# Patient Record
Sex: FEMALE | Race: WHITE | Employment: UNEMPLOYED
[De-identification: names, ages, dates, MRNs, and addresses within clinical notes are randomized per-mention and may not be internally consistent; named-entity substitution may affect disease eponyms.]

---

## 2017-06-03 ENCOUNTER — HEALTH MAINTENANCE LETTER (OUTPATIENT)
Age: 32
End: 2017-06-03

## 2017-07-01 ENCOUNTER — TRANSFERRED RECORDS (OUTPATIENT)
Dept: HEALTH INFORMATION MANAGEMENT | Facility: CLINIC | Age: 32
End: 2017-07-01

## 2017-07-01 LAB — PAP SMEAR - HIM PATIENT REPORTED: NEGATIVE

## 2018-01-18 ENCOUNTER — TRANSFERRED RECORDS (OUTPATIENT)
Dept: HEALTH INFORMATION MANAGEMENT | Facility: CLINIC | Age: 33
End: 2018-01-18

## 2018-01-29 ENCOUNTER — ALLIED HEALTH/NURSE VISIT (OUTPATIENT)
Dept: EDUCATION SERVICES | Facility: CLINIC | Age: 33
End: 2018-01-29
Payer: COMMERCIAL

## 2018-01-29 DIAGNOSIS — O24.419 GESTATIONAL DIABETES: Primary | ICD-10-CM

## 2018-01-29 PROCEDURE — G0109 DIAB MANAGE TRN IND/GROUP: HCPCS

## 2018-01-29 NOTE — PROGRESS NOTES
Diabetes Self-Management Training - Gestational Diabetes    SUBJECTIVE/OBJECTIVE:  Eloina Aj presents today for education related to gestational diabetes.  She is accompanied by self    Patient's gestational diabetes management related comments/concerns: doesn't want insulin this time     Patient's emotional response to diabetes: expresses readiness to learn and concern for health and well-being    There were no vitals taken for this visit.    Pre pregnancy weight: 270#    Weight gain (-)11 lbs at 30 weeks gestation.    Estimated Date of Delivery: Data Unavailable    Lab Results   Component Value Date     2006       3 hour OGTT: Fastin; 1 hr: 169; 2 hr: 187, 3 hr: 126 on 18    History   Smoking Status     Never Smoker   Smokeless Tobacco     Not on file       Lifestyle and Health Behaviors:  Physical Activity: no regular exercise program    Nutrition:  Patient eats 3 meals and 3 snacks per day.    Breakfast:  Eggs 2 slices toast OR eggs with 1 cup fruit  Snack:  Pop chips OR banana  Lunch:  Salad with chicken OR lasagna and [place] bread  Snack:  Chocolate OR skips  Dinner:  Chicken salad OR tator tot hot dish  Bedtime Snack:  Fruit OR skips    Other time(s) food is eaten? no     Beverages: water    Cultural/Worship diet restrictions: No     Biggest challenge to healthy eating is:  none    Pre- Vitamin: Yes    Supplements: No   Experiencing nausea?  No     Socio/Economic considerations:  Support System: family and spouse/significant other    Health Beliefs and Attitudes:   Stage of Change: ACTION (Actively working towards change)    ASSESSMENT:  Patient had GDM in previous pregnancy, and did need insulin at that time. Is already testing blood sugars    Date Fasting blood sugar 1 hour post brkfst 1 hour post lunch 1 hour post dinner     92 114      95 106  102    95 108 *115 120       INTERVENTION:    Educational topics covered today:  GDM diagnosis, pathophysiology,  Risks and Complications of GDM, Means of controlling GDM, Using a Blood Glucose Monitor, Blood Glucose Goals, Logging and Interpreting Glucose Results, Ketone Testing, When to Call a Diabetes Educator or OB Provider, Healthy Eating During Pregnancy, Counting Carbohydrates, Meal Planning for GDM, and Physical Activity    Educational materials provided today:   Benedict Understanding Gestational Diabetes  GDM Log Book  Sharps Disposal  Care After Delivery    Pt verbalized understanding of concepts discussed and recommendations provided today.     PLAN:  Check glucose 4 times daily, before breakfast and 1 hour after each meal.     Check Ketones daily for one week, if negative, reduce testing to once a week.     Physical activity recommended: as able.    Meal plan: 2-3 carbs at breakfast, 3-4 carbs at lunch, 3-4 carbs at supper, 1-2 carbs at 3 snacks a day.  Follow consistent CHO meal plan, eat CHO and protein/fat at all meals/snacks.    Call/e-mail/MyChart message diabetes educator if 3 or more blood sugars are above the goal in 1 week or if ketones are positive.     Call/e-mail/MyChart message with questions/concerns.    FOLLOW-UP:  Call or e-mail educator if 3 or more blood sugars are above goal in 1 week.  Call or e-mail with questions or concerns.  Appointment scheduled on 2/6.     PALOMO Sullivan CDE  Time Spent: 90 minutes  Encounter type: Group class

## 2018-01-29 NOTE — MR AVS SNAPSHOT
"              After Visit Summary   1/29/2018    Eloina Aj    MRN: [id number]           Patient Information     Date Of Birth          1985        Visit Information        Provider Department      1/29/2018 8:30 AM RI GDM [facility]        Today's Diagnoses     Gestational diabetes    -  1       Follow-ups after your visit        Your next 10 appointments already scheduled     Feb 06, 2018 12:30 PM CST   Diabetic Education with [place] DIABETIC ED RESOURCE   [facility] ([facility])    [address]  [facility] [address] [phone]            Mar 29, 2018   Procedure with Fern Nicolas MD   [facility] Labor and Delivery (--)    [address]  [facility] [address] [phone]              Who to contact     If you have questions or need follow up information about today's clinic visit or your schedule please contact [facility] directly at [phone].  Normal or non-critical lab and imaging results will be communicated to you by [employer], letter or phone within 4 business days after the clinic has received the results. If you do not hear from us within 7 days, please contact the clinic through [employer] or phone. If you have a critical or abnormal lab result, we will notify you by phone as soon as possible.  Submit refill requests through [employer] or call your pharmacy and they will forward the refill request to us. Please allow 3 business days for your refill to be completed.          Additional Information About Your Visit        [employer] Information     [employer] lets you send messages to your doctor, view your test results, renew your prescriptions, schedule appointments and more. To sign up, go to [email or web address] . Click on \"Log in\" on the left side of the screen, which will take you to the Welcome page. Then click on \"Sign up Now\" on the right side of the page. "     You will be asked to enter the access code listed below, as well as some personal information. Please follow the directions to create your username and password.     Your access code is: [email or web address]  Expires: 2018 11:07 AM     Your access code will  in 90 days. If you need help or a new code, please call your [place] clinic or [phone].        Care EveryWhere ID     This is your Care EveryWhere ID. This could be used by other organizations to access your [place] medical records  [id number]         Blood Pressure from Last 3 Encounters:   06 122/64   06 132/80   05 122/68    Weight from Last 3 Encounters:   06 73.9 kg (163 lb)   06 74.8 kg (165 lb)   05 73.9 kg (163 lb)              We Performed the Following     DIABETES EDUCATION - Group []           Today's Medication Changes          These changes are accurate as of 18 11:07 AM.  If you have any questions, ask your nurse or doctor.               Start taking these medicines.        Dose/Directions    acetone (Urine) test Strp   Used for:  Gestational diabetes        Test once daily x1 week, then reduce to once weekly   Quantity:  25 each   Refills:  1            Where to get your medicines      These medications were sent to [facility] [address] [place] [address] [address] [facility] [address] [facility]   [facility] [address], [facility] [address]     Phone:  [phone]     acetone (Urine) test Strp                Primary Care Provider Office Phone # Fax #    Elijah Diggs [phone]       [address] [place] [address]        Equal Access to Services     WILLA CARSON AH: Hadii jayson Chatman, wajayyda luqadaha, qaybta kaalmada adilia, mary dickens. So [facility] [phone].    ATENCIÓN: Si habla español, tiene a marrufo disposición servicios gratuitos de asistencia lingüística. Llame al [phone].    We comply with applicable federal  civil rights laws and [place] laws. We do not discriminate on the basis of race, color, national origin, age, disability, sex, sexual orientation, or gender identity.            Thank you!     Thank you for choosing [facility]  for your care. Our goal is always to provide you with excellent care. Hearing back from our patients is one way we can continue to improve our services. Please take a few minutes to complete the written survey that you may receive in the mail after your visit with us. Thank you!             Your Updated Medication List - Protect others around you: Learn how to safely use, store and throw away your medicines at www.disposemymeds.org.          This list is accurate as of 1/29/18 11:07 AM.  Always use your most recent med list.                   Brand Name Dispense Instructions for use Diagnosis    acetone (Urine) test Strp     25 each    Test once daily x1 week, then reduce to once weekly    Gestational diabetes       EFFEXOR XR 75 MG 24 hr capsule   Generic drug:  venlafaxine     3 month    1 CAPSULE DAILY WITH FOOD    Depressive disorder, not elsewhere classified       ORTHO TRI-CYCLEN TABS   OR     3 PK    1 tab po as directed        * RELPAX 40 MG tablet   Generic drug:  eletriptan     30    1 TABLET 1 TIME ONLY,REPEAT AFTER 2 HR AS NEEDED    Migraine, unspecified, without mention of intractable migraine without mention of status migrainosus       * RELPAX 40 MG tablet   Generic drug:  eletriptan     30    1 TABLET 1 TIME ONLY,REPEAT AFTER 2 HR AS NEEDED    Headache(784.0)       * Notice:  This list has 2 medication(s) that are the same as other medications prescribed for you. Read the directions carefully, and ask your doctor or other care provider to review them with you.

## 2018-02-06 ENCOUNTER — ALLIED HEALTH/NURSE VISIT (OUTPATIENT)
Dept: EDUCATION SERVICES | Facility: CLINIC | Age: 33
End: 2018-02-06
Payer: COMMERCIAL

## 2018-02-06 DIAGNOSIS — O24.419 GESTATIONAL DIABETES: Primary | ICD-10-CM

## 2018-02-06 PROCEDURE — G0108 DIAB MANAGE TRN  PER INDIV: HCPCS

## 2018-02-06 NOTE — MR AVS SNAPSHOT
After Visit Summary   2/6/2018    Eloina Aj    MRN: [id number]           Patient Information     Date Of Birth          1985        Visit Information        Provider Department      2/6/2018 12:30 PM [place] DIABETIC ED RESOURCE [facility] Instructions    Here are some ideas for breakfast or bedtime snack. Pick a carbohydrate from the right and a protein from the left. If you have carbohydrates 30 grams of total carbohydrate and 3-5 grams of fiber that is even better.   Fruits are not listed as they can cause the blood sugar to raise blood sugar quickly and be used up early in the night. Fruits are better at meals, or morning or afternoon snack.     Protein/Fat list (about 14 grams of protein or 2 fat servings) Carbohydrate list (about 30 grams of carbohydrate)   2 slices of cheese English Muffin   2 TBS Peanut butter/[place] butter/Sun butter 10 crackers     cup Cottage cheese 2% 2 pieces of toast   2 oz any cooked meat - less than deck of cards size 1 hamburger or hot dog bun   1.5 oz of soy nuts 1 whole wheat manuelito   Avocado or guacamole 6 soniya cracker squares     cup tuna - can add mayonnaise 1 cup full fat ice cream - no candy or sauce   2 eggs - boiled, poached, fried, scrambled, omelet 30 chips   1 veggie mariam (might have carbohydrates also) Greek yogurt - 30 grams of carbohydrate   2 TBS Coconut 2 - 6 inch tortillas corn or flour   12 shrimp - not breaded 2 toaster waffles - no syrup   2-1oz meatballs   bagel   2 Tbs cream cheese - plain, veggie, salmon - no fruit or honey. 6 cups popcorn - unsweetened     cup of nuts Granola bar of 3o grams of carbohydrate   10 olives 1 cup of unsweetened lentils or beans.    Tofu or Temph 4-5oz 1 cup potato salad     You can always add vegetables with dip, salad dressing or salsa also.             Follow-ups after your visit        Your next 10 appointments already scheduled     Mar 29, 2018   Procedure with  "Fern Nicolas MD   [facility] Labor and Delivery (--)    [address] [facility] [address] [phone]              Who to contact     If you have questions or need follow up information about today's clinic visit or your schedule please contact [facility] directly at [phone].  Normal or non-critical lab and imaging results will be communicated to you by MyChart, letter or phone within 4 business days after the clinic has received the results. If you do not hear from us within 7 days, please contact the clinic through MyChart or phone. If you have a critical or abnormal lab result, we will notify you by phone as soon as possible.  Submit refill requests through [employer] or call your pharmacy and they will forward the refill request to us. Please allow 3 business days for your refill to be completed.          Additional Information About Your Visit        [employer] Information     [employer] lets you send messages to your doctor, view your test results, renew your prescriptions, schedule appointments and more. To sign up, go to [email or web address] . Click on \"Log in\" on the left side of the screen, which will take you to the Welcome page. Then click on \"Sign up Now\" on the right side of the page.     You will be asked to enter the access code listed below, as well as some personal information. Please follow the directions to create your username and password.     Your access code is: [email or web address]  Expires: 2018 11:07 AM     Your access code will  in 90 days. If you need help or a new code, please call your [place] clinic or [phone].        Care EveryWhere ID     This is your Care EveryWhere ID. This could be used by other organizations to access your [place] medical records  [id number]         Blood Pressure from Last 3 Encounters:   06 122/64   06 132/80   05 122/68    Weight from Last 3 Encounters:   06 73.9 kg (163 " lb)   02/02/06 74.8 kg (165 lb)   05/16/05 73.9 kg (163 lb)              Today, you had the following     No orders found for display       Primary Care Provider Office Phone # Fax #    Elijah Diggs [phone] [address] [place] [address]        Equal Access to Services     [facility]: Hadii aad ku hadasho Soomaali, waaxda luqadaha, qaybta kaalmada adeegyada, waxay idiin hayaan adeeg kharash la'aan ah. So [facility] [phone].    ATENCIÓN: Si habla español, tiene a marrufo disposición servicios gratuitos de asistencia lingüística. Anna Marieame al [phone].    We comply with applicable federal civil rights laws and [place] laws. We do not discriminate on the basis of race, color, national origin, age, disability, sex, sexual orientation, or gender identity.            Thank you!     Thank you for choosing [facility]  for your care. Our goal is always to provide you with excellent care. Hearing back from our patients is one way we can continue to improve our services. Please take a few minutes to complete the written survey that you may receive in the mail after your visit with us. Thank you!             Your Updated Medication List - Protect others around you: Learn how to safely use, store and throw away your medicines at www.disposemymeds.org.          This list is accurate as of 2/6/18 12:58 PM.  Always use your most recent med list.                   Brand Name Dispense Instructions for use Diagnosis    acetone (Urine) test Strp     25 each    Test once daily x1 week, then reduce to once weekly    Gestational diabetes       EFFEXOR XR 75 MG 24 hr capsule   Generic drug:  venlafaxine     3 month    1 CAPSULE DAILY WITH FOOD    Depressive disorder, not elsewhere classified       ORTHO TRI-CYCLEN TABS   OR     3 PK    1 tab po as directed        * RELPAX 40 MG tablet   Generic drug:  eletriptan     30    1 TABLET 1 TIME ONLY,REPEAT AFTER 2 HR AS NEEDED     Migraine, unspecified, without mention of intractable migraine without mention of status migrainosus       * RELPAX 40 MG tablet   Generic drug:  eletriptan     30    1 TABLET 1 TIME ONLY,REPEAT AFTER 2 HR AS NEEDED    Headache(784.0)       * Notice:  This list has 2 medication(s) that are the same as other medications prescribed for you. Read the directions carefully, and ask your doctor or other care provider to review them with you.

## 2018-02-06 NOTE — PROGRESS NOTES
Gestational Diabetes Follow-up Visit    SUBJECTIVE/OBJECTIVE:  Eloina Aj presents today for education and evaluation of glucose control related to gestational diabetes    She is accompanied by self    Patient's gestational diabetes management related comments/concerns: hard time figuring out meal ideas    There were no vitals taken for this visit.    Weight gain (-6) lbs at 32 weeks gestation.--improved    Blood Glucose/Ketone Log:    Date Ketones Fasting Post Breakfast Post Lunch Post Supper   1/29 1/30 Neg 96 101 109 120   1/31 Neg 94 116 101 136   2/1 Neg 82 153 101 138   2/2 Neg 95 86     2/3 Neg 85 138 108    2/4  89 121 119    2/5 Neg 87 159  106   2/6 Neg 94 123       Current gestational diabetes management:    Taking medications for gestational diabetes? No    Physical Activity: no regular exercise program    Nutrition:  Patient is following recommended meal plan and counts carbohydrates in choices.    Breakfast- breakfast bar OR cereal (high numbers following this) OR eggs and toast  Snack- fruit OR vegetables OR cheese stick  Lunch - salad and yogurt OR skips  Snack - veggies OR cheese stick and chips  Dinner - salad with chicken  Snack - skips OR yogurt OR ice cream    ASSESSMENT:  Eloina is not very hungry after breakfast, so it is hard for her to eat lunch and snacks later in the day. There are some days in which she is only eating non carb snacks and skipping lunch, and the next day her fasting number is typically higher. She is also sometimes skipping bedtime snack or having it early (7pm), also showing a higher fasting number the next day.    Health Beliefs and Attitudes:   Stage of Change: ACTION (Actively working towards change)    INTERVENTION:  Encouraged at least the minimum carbs with each meal and snack.     Educational topics covered today:  What to expect after delivery, Future testing for Type 2 diabetes (2 hour OGTT at 6 week post-partum check-up and annual fasting blood  glucose level), Risk of GDM and planning ahead for future pregnancies, Recommended lifestyle interventions for reducing the risk of Type 2 Diabetes, When to Call a Diabetes Educator or OB Provider    Educational Materials provided today:  [facility]enedict Preventing Diabetes    PLAN:  Check glucose 4 times daily.  Check ketones once a week when readings are consistently negative.  Continue with recommended physical activity.  Continue to follow recommended meal plan: 2-3 carbs at breakfast, 3-4 carbs at lunch, 3-4 carbs at supper, 1-2 carbs at snacks.  Follow consistent CHO meal plan, eat CHO and protein/fat at all meals/snacks.    Call/e-mail/MyChart message diabetes educator if 3 or more blood sugars are above the goal in 1 week or if ketones are positive.     FOLLOW-UP:  Follow up with diabetes educator in 2 weeks.  Call or e-mail educator if 3 or more blood sugars are above goal in 1 week.  Call or e-mail with questions or concerns.    Call/e-mail/MyChart message diabetes educator if 3 or more blood sugars are above the goal in 1 week or if ketones are positive.     PALOMO Sullivan CDE  Time spent was 30 minutes  Encounter type: Individual    Any diabetes medication dose changes were made via the CDE Protocol and Collaborative Practice Agreement with the patient's OB/GYN provider. A copy of this encounter was shared with the provider.

## 2018-02-06 NOTE — PATIENT INSTRUCTIONS
Here are some ideas for breakfast or bedtime snack. Pick a carbohydrate from the right and a protein from the left. If you have carbohydrates 30 grams of total carbohydrate and 3-5 grams of fiber that is even better.   Fruits are not listed as they can cause the blood sugar to raise blood sugar quickly and be used up early in the night. Fruits are better at meals, or morning or afternoon snack.     Protein/Fat list (about 14 grams of protein or 2 fat servings) Carbohydrate list (about 30 grams of carbohydrate)   2 slices of cheese English Muffin   2 TBS Peanut butter/[place] butter/Sun butter 10 crackers     cup Cottage cheese 2% 2 pieces of toast   2 oz any cooked meat - less than deck of cards size 1 hamburger or hot dog bun   1.5 oz of soy nuts 1 whole wheat manuelito   Avocado or guacamole 6 soniya cracker squares     cup tuna - can add mayonnaise 1 cup full fat ice cream - no candy or sauce   2 eggs - boiled, poached, fried, scrambled, omelet 30 chips   1 veggie mariam (might have carbohydrates also) Greek yogurt - 30 grams of carbohydrate   2 TBS Coconut 2 - 6 inch tortillas corn or flour   12 shrimp - not breaded 2 toaster waffles - no syrup   2-1oz meatballs   bagel   2 Tbs cream cheese - plain, veggie, salmon - no fruit or honey. 6 cups popcorn - unsweetened     cup of nuts Granola bar of 3o grams of carbohydrate   10 olives 1 cup of unsweetened lentils or beans.    Tofu or Temph 4-5oz 1 cup potato salad     You can always add vegetables with dip, salad dressing or salsa also.

## 2018-02-12 ENCOUNTER — TELEPHONE (OUTPATIENT)
Dept: EDUCATION SERVICES | Facility: CLINIC | Age: 33
End: 2018-02-12

## 2018-02-12 NOTE — TELEPHONE ENCOUNTER
Gestational Diabetes Follow-up    Subjective/Objective:    Eloina Aj sent in blood glucose log for review. Last date of communication was: 2/6/2018.    Gestational diabetes is being managed with diet and activity    Estimated Date of Delivery: March 29, 2018    BG/Food Log:   Email from patient:     Eloina Aj  [birth year]    Here is my last week I had 3 high numbers.  The 100 was after a bowl of cereal for bed time snack.  The 2 97s was I bekieve from not sleeping well.        Assessment:  Ketones:Negative.   Fasting blood glucoses: 57% in target.  After breakfast: 100% in target.  After lunch: 100% in target.  After dinner: 100% in target.    Plan/Response:  No changes in the patient's current treatment plan.  Follow-up on Thursday or Friday (February 15th or 16th)    Email to patient:  Roel Duvall!    Thank you for sending in your blood sugar and ketone logs! Other than those 3 high fasting readings everything looks great.  It looks like you have an idea of why those number went high.  Let s continue to monitor, please send in your blood sugar logs again on Thursday or Friday of this week (February 15th or 16th) and we will see if this has improved.      In the meantime, continue with the current recommended meal plan: 2-3 carbs at breakfast, 3-4 carbs at lunch, 3-4 carbs at supper and 1-2 carbs at snack. Having a source of protein with your bedtime snack can sometime help those morning readings.     Keep up the good work and have a great week!    Micaela Marcano, PALOMO, LD

## 2018-02-16 ENCOUNTER — TELEPHONE (OUTPATIENT)
Dept: EDUCATION SERVICES | Facility: CLINIC | Age: 33
End: 2018-02-16

## 2018-02-16 NOTE — TELEPHONE ENCOUNTER
Gestational Diabetes Follow-up    Subjective/Objective:    Eloina Aj sent in blood glucose log for review. Last date of communication was: 2/12.    Gestational diabetes is being managed with diet and activity    Taking diabetes medications: no    Estimated Date of Delivery: March 29  BG/Food Log:   I was told to send my log from Monday.  This is what I have thus far.      Assessment:    Ketones:neg.   Fasting blood glucoses: 73% in target.  After breakfast: 100% in target.  After lunch: 100% in target.  After dinner: 80% in target.    Plan/Response:  No changes in the patient's current treatment plan.  Email:  Roel Duvall,   Thanks for the update! You are doing a great job with the record keeping J    Right now everything looks good! I see a few higher morning numbers last week, but looks like things have leveled off.     Please keep up the good work, you re right on track!  Can you send in again next Thursday or Friday and we ll keep an eye on things?    Thank you! Have a nice weekend,   Amirah Ortiz RD, LD, CDE      Any diabetes medication dose changes were made via the CDE Protocol and Collaborative Practice Agreement with the patient's OB/GYN provider. A copy of this encounter was shared with the provider.

## 2018-02-19 ENCOUNTER — TELEPHONE (OUTPATIENT)
Dept: EDUCATION SERVICES | Facility: CLINIC | Age: 33
End: 2018-02-19

## 2018-02-19 NOTE — TELEPHONE ENCOUNTER
Gestational Diabetes Follow-up    Subjective/Objective:    Eloina NAJERA Jean Marie sent in blood glucose log for review. Last date of communication was: 2/16/18.    Gestational diabetes is being managed with diet and activity    Taking diabetes medications: no    Estimated Date of Delivery: 3/29/18    BG/Food Log:       Assessment:    Ketones:I had 4 high numbers.   The 142 i had steak and a small portion of a baked potatoe and 1/4 c of corn.  141 I had turkey noodle soup it could be because I really didn't have a lunch.  The 157 I had a brat in a bun and 1/4 c mac n cheese.  Then the 103 I am not sure why I normally have a cheese stick before bed and that seems to keep my mornings in good numbers.          Post dinner readings slightly above target, could be due to limited protein or fiber with the meals.  Patient is 34-35 weeks pregnant so this could also be an indication that insulin needs are increasing.    Fasting blood glucoses: 71% in target.  After breakfast: 100% in target.  After lunch: 100% in target.  After dinner: 50% in target.    Plan/Response:  Keep a food record for the next follow-up.  No changes in the patient's current treatment plan.  Follow-up in 3-4 days.    Sylvie Julio MS, RD, LD, CDE    E-mail response to patient:  Roel Duvall,    Thank you for sending in your readings.  It looks like you were with in the carbohydrate recommendations for the dinner meals with elevated readings.  It is possible with the turkey soup meal there wasn t enough protein, but also the carbohydrate sources for the meals had little fiber which sometimes causes BGs to increase.  Since I don t know what you ate at the other meals with in target readings it s hard to say if there are things you are at the other meals that help meet the targets.  Could you record your dinner readings for the next few days and send in your logbook again on Thursday or Friday this week?    You could try having whole grains of fiber containing  foods at dinner such as whole grain noodles, brown rice, or whole grain bread products for your carbohydrate sources.  If your fasting readings are starting to increase it could be more of an indication that growth hormones are increasing, which are much harder to control with foods.    If you have 3 more elevated readings please send in again on Thrusday, if not, then send in again on Friday.    Thanks!      Sylvie Julio MS, RD, LD, CDE  [facility]  Diabetes & Nutrition Care  [email or web address]  Ph: [phone]

## 2018-02-23 ENCOUNTER — TELEPHONE (OUTPATIENT)
Dept: EDUCATION SERVICES | Facility: CLINIC | Age: 33
End: 2018-02-23

## 2018-02-23 NOTE — TELEPHONE ENCOUNTER
Gestational Diabetes Follow-up    Subjective/Objective:    Eloina Aj sent in blood glucose log for review. Last date of communication was: 2/19.    Gestational diabetes is being managed with diet and activity    Taking diabetes medications: no    Estimated Date of Delivery: March 29    BG/Food Log:   Eloina Aj  [birth year]    I was told to send my log in on Friday from Monday.        Assessment:    Ketones:N.   Fasting blood glucoses: 75% in target.  After breakfast: 100% in target.  After lunch: 100% in target.  After dinner: 66% in target.    Plan/Response:  No changes in the patient's current treatment plan.    Email:  Roel Duvall,     Looking good! Nice to see negative ketones and nearly all the numbers in goal ;)     It s not unusual to see some changes as the baby grows during the last month, but right now your numbers are right on track.     Keep up the good work! Can you please send in again next Thursday or Friday and we ll just keep an eye on things?   Please do send sooner if you see more high numbers or ketones.    Thanks Eloina!  Have a nice weekend,   Amirah Ortiz RD, LD, CDE      Any diabetes medication dose changes were made via the CDE Protocol and Collaborative Practice Agreement with the patient's OB/GYN provider. A copy of this encounter was shared with the provider.

## 2018-02-26 ENCOUNTER — TELEPHONE (OUTPATIENT)
Dept: EDUCATION SERVICES | Facility: CLINIC | Age: 33
End: 2018-02-26

## 2018-02-26 NOTE — LETTER
2/26/2018         RE: Eloina Aj  [id number]  [place] [address]        Dear Colleague,    Thank you for referring your patient, Eloina Aj, to the [place] [facility]. Please see a copy of my visit note below.    Gestational Diabetes Follow-up    Subjective/Objective:    Eloina Aj sent in blood glucose log for review. Last date of communication was: 2/23/2018.    Gestational diabetes is being managed with diet and activity    Taking diabetes medications: no    Estimated Date of Delivery: March 29, 2018    Email from Eloina:    Eloina Aj  [birth year]    Since Friday I had  some high numbers.  For lunch Friday I had a turkey sandwich and crackers and fruit I most likely had to much fruit to cause it to be 156.  Then Sunday for breakfast I had a couple of crackers and a cheese stick.  Then lunch I had a tuna sandwich with a few blueberries and strawberries and it was still over 120 2hrs later.  I had ribs and a salad last night late around 7pm and I qas to full to have a snack before bed.     Please let me knkw if you need anything else.    Thanks,  Eloina Jean Marie    BG/Food Log:       Assessment:  Patient continues to have higher than goal blood glucose readings, however there doesn't seem to be a pattern.  Patient was able to identify that on the 23rd she might have eaten too much fruit which caused a 156 reading and on the 25th she only had steak and salad for dinner-no bed time snack, which could have caused the high fasting on the 26th.  Will ask patient to send in logs again on Thursday March 1st and if she is still having high readings, recommend starting insulin.      Ketones:Negative.   Fasting blood glucoses: 50% in target.  After breakfast: 66% in target.  After lunch: 0% in target.  After dinner: 100% in target.    Plan/Response:  No changes in the patient's current treatment plan.    Email to patient:    Roel Duvall!    Thank you for sending in your  logs! It seems like a couple of the high numbers are explainable.  If you didn t have any carbs at your dinner last night, just steak and salad, that could cause your liver to dump some sugar to give your body energy, causing the higher fasting reading (102).   You were able to identify that you possibly ate too much fruit before your high after lunch reading on the 23rd.  However, there are a couple high readings that seem to be unexplained.  We would like to continue to monitor this for a couple days to see if they come down at all.      Could you send in your logs again on Thursday March 1st and we will review again and think about adding in some insulin to help bring those numbers down.  Continue to follow the recommended meal plan and we will review again on Thursday!     Great job Eloina!    Micaela Marcano RD, LD    Any diabetes medication dose changes were made via the CDE Protocol and Collaborative Practice Agreement with the patient's referring provider. A copy of this encounter was shared with the provider.

## 2018-02-26 NOTE — TELEPHONE ENCOUNTER
Gestational Diabetes Follow-up    Subjective/Objective:    Eloina E Jean Marie sent in blood glucose log for review. Last date of communication was: 2/23/2018.    Gestational diabetes is being managed with diet and activity    Taking diabetes medications: no    Estimated Date of Delivery: March 29, 2018    Email from Eloina:    Eloina Aj  [birth year]    Since Friday I had  some high numbers.  For lunch Friday I had a turkey sandwich and crackers and fruit I most likely had to much fruit to cause it to be 156.  Then Sunday for breakfast I had a couple of crackers and a cheese stick.  Then lunch I had a tuna sandwich with a few blueberries and strawberries and it was still over 120 2hrs later.  I had ribs and a salad last night late around 7pm and I qas to full to have a snack before bed.     Please let me knkw if you need anything else.    Thanks,  Eloina Aj    BG/Food Log:       Assessment:  Patient continues to have higher than goal blood glucose readings, however there doesn't seem to be a pattern.  Patient was able to identify that on the 23rd she might have eaten too much fruit which caused a 156 reading and on the 25th she only had steak and salad for dinner-no bed time snack, which could have caused the high fasting on the 26th.  Will ask patient to send in logs again on Thursday March 1st and if she is still having high readings, recommend starting insulin.      Ketones:Negative.   Fasting blood glucoses: 50% in target.  After breakfast: 66% in target.  After lunch: 0% in target.  After dinner: 100% in target.    Plan/Response:  No changes in the patient's current treatment plan.    Email to patient:    Roel Duvall!    Thank you for sending in your logs! It seems like a couple of the high numbers are explainable.  If you didn t have any carbs at your dinner last night, just steak and salad, that could cause your liver to dump some sugar to give your body energy, causing the higher fasting reading (102).    You were able to identify that you possibly ate too much fruit before your high after lunch reading on the 23rd.  However, there are a couple high readings that seem to be unexplained.  We would like to continue to monitor this for a couple days to see if they come down at all.      Could you send in your logs again on Thursday March 1st and we will review again and think about adding in some insulin to help bring those numbers down.  Continue to follow the recommended meal plan and we will review again on Thursday!     Great job Eloina!    Micaela Marcano, PALOMO, LD    Any diabetes medication dose changes were made via the CDE Protocol and Collaborative Practice Agreement with the patient's referring provider. A copy of this encounter was shared with the provider.

## 2018-02-28 ENCOUNTER — TELEPHONE (OUTPATIENT)
Dept: EDUCATION SERVICES | Facility: CLINIC | Age: 33
End: 2018-02-28

## 2018-02-28 NOTE — TELEPHONE ENCOUNTER
Gestational Diabetes Follow-up    Subjective/Objective:    Eloina Aj sent in blood glucose log for review. Last date of communication was: 2/26/18.    Gestational diabetes is being managed with diet and activity    Taking diabetes medications: no    Estimated Date of Delivery: Data Unavailable    BG/Food Log:   Eloina Aj  [birth year]    I have had some high numbers and I called and left a message regarding my ketones.  There was a small trace it was pink.  I then took my fasting and it was 108 i did have a bedtime snack with protien before bed. I was just curious on the ketones and I checked again and it seemed darker.      Assessment:  Patient has continued to have elevated blood sugars despite increased compliance with meal plan (see telephone encounter 2/26 for more blood sugar data). Will at this point recommend insulin, and will fax MD at [facility] that we are recommending insulin for this patient, who will then be referred to [facility] [place].       Ketones:small/trace.   Fasting blood glucoses: 33% in target.  After breakfast: 50% in target.  After lunch: 100% in target.  After dinner: 50% in target.    Plan/Response:  Recommend referral to Endocrinology.    Hi Eloina,    Thank you for sending in these numbers. It looks like despite your dietary changes these last few days, we are still seeing elevated numbers. At this point, I do think that you need a little bit of insulin to help regulate your blood sugars (those pregnancy hormones are just quite strong for you!). I will forward my recommendation to your MD, who will then likely send a referral for you to be seen by the Endocrinologists at [facility]. Please contact your clinic if you are not contacted by Friday for scheduling.     Thank you!  PALOMO Sullivan CDE    Any diabetes medication dose changes were made via the CDE Protocol and Collaborative Practice Agreement with the patient's OB/GYN provider. A copy  of this encounter was shared with the provider.

## 2018-02-28 NOTE — LETTER
2/28/2018         RE: Eloina Aj  [address] [facility] [place] [facility] [address]        Dear Colleague,    Thank you for referring your patient, Eloina Aj, to the [place] [facility]. Please see a copy of my visit note below.    Based on her blood sugars for the last week (also faxing you a telephone encounter from 2/26), would recommend initiation of insulin, and told patient that she would be contacted for scheduling with [facility] this week if you are in agreement with this plan. Thank you!    Gestational Diabetes Follow-up    Subjective/Objective:    Eloina Aj sent in blood glucose log for review. Last date of communication was: 2/26/18.    Gestational diabetes is being managed with diet and activity    Taking diabetes medications: no    Estimated Date of Delivery: Data Unavailable    BG/Food Log:   Eloina Aj  [birth year]    I have had some high numbers and I called and left a message regarding my ketones.  There was a small trace it was pink.  I then took my fasting and it was 108 i did have a bedtime snack with protien before bed. I was just curious on the ketones and I checked again and it seemed darker.      Assessment:  Patient has continued to have elevated blood sugars despite increased compliance with meal plan (see telephone encounter 2/26 for more blood sugar data). Will at this point recommend insulin, and will fax MD at [facility] that we are recommending insulin for this patient, who will then be referred to [facility] [place].       Ketones:small/trace.   Fasting blood glucoses: 33% in target.  After breakfast: 50% in target.  After lunch: 100% in target.  After dinner: 50% in target.    Plan/Response:  Recommend referral to Endocrinology.    Roel Duvall,    Thank you for sending in these numbers. It looks like despite your dietary changes these last few days, we are still seeing elevated numbers. At this point, I do think that you need a little  bit of insulin to help regulate your blood sugars (those pregnancy hormones are just quite strong for you!). I will forward my recommendation to your MD, who will then likely send a referral for you to be seen by the Endocrinologists at [facility] [place]. Please contact your clinic if you are not contacted by Friday for scheduling.     Thank you!  PALOMO Sullivan CDE    Any diabetes medication dose changes were made via the CDE Protocol and Collaborative Practice Agreement with the patient's OB/GYN provider. A copy of this encounter was shared with the provider.

## 2018-03-26 RX ORDER — PRENATAL VIT/IRON FUM/FOLIC AC 27MG-0.8MG
1 TABLET ORAL DAILY
COMMUNITY
End: 2018-12-06

## 2018-03-26 NOTE — PHARMACY-ADMISSION MEDICATION HISTORY
Admission medication history interview status for this patient is complete. See [facility] admission navigator for allergy information, prior to admission medications and immunization status.     Med rec completed by pre admitting Vijaya Niño RN Mon Mar 26, 2018 11:23 AM         Prior to Admission medications    Medication Sig Last Dose Taking? Auth Provider   LEVOTHYROXINE SODIUM PO Take 75 mcg by mouth daily  Yes Reported, Patient   Prenatal Vit-Fe Fumarate-FA (PRENATAL MULTIVITAMIN PLUS IRON) 27-0.8 MG TABS per tablet Take 1 tablet by mouth daily  Yes Reported, Patient

## 2018-03-28 ENCOUNTER — HOSPITAL ENCOUNTER (OUTPATIENT)
Dept: LAB | Facility: CLINIC | Age: 33
Discharge: HOME | End: 2018-03-28
Attending: OBSTETRICS & GYNECOLOGY | Admitting: OBSTETRICS & GYNECOLOGY
Payer: COMMERCIAL

## 2018-03-28 DIAGNOSIS — Z01.812 PRE-OPERATIVE LABORATORY EXAMINATION: ICD-10-CM

## 2018-03-28 LAB
ABO + RH BLD: NORMAL
ABO + RH BLD: NORMAL
BLD GP AB SCN SERPL QL: NORMAL
BLOOD BANK CMNT PATIENT-IMP: NORMAL
HGB BLD-MCNC: 12.1 G/DL (ref 11.7–15.7)
SPECIMEN EXP DATE BLD: NORMAL

## 2018-03-28 PROCEDURE — 86900 BLOOD TYPING SEROLOGIC ABO: CPT | Performed by: OBSTETRICS & GYNECOLOGY

## 2018-03-28 PROCEDURE — 36415 COLL VENOUS BLD VENIPUNCTURE: CPT | Performed by: OBSTETRICS & GYNECOLOGY

## 2018-03-28 PROCEDURE — 86901 BLOOD TYPING SEROLOGIC RH(D): CPT | Performed by: OBSTETRICS & GYNECOLOGY

## 2018-03-28 PROCEDURE — 86780 TREPONEMA PALLIDUM: CPT | Performed by: OBSTETRICS & GYNECOLOGY

## 2018-03-28 PROCEDURE — 86850 RBC ANTIBODY SCREEN: CPT | Performed by: OBSTETRICS & GYNECOLOGY

## 2018-03-28 PROCEDURE — 85018 HEMOGLOBIN: CPT | Performed by: OBSTETRICS & GYNECOLOGY

## 2018-03-29 ENCOUNTER — HOSPITAL ENCOUNTER (INPATIENT)
Facility: CLINIC | Age: 33
LOS: 2 days | Discharge: HOME HEALTH | End: 2018-03-31
Attending: OBSTETRICS & GYNECOLOGY | Admitting: OBSTETRICS & GYNECOLOGY
Payer: COMMERCIAL

## 2018-03-29 ENCOUNTER — ANESTHESIA (OUTPATIENT)
Dept: OBGYN | Facility: CLINIC | Age: 33
End: 2018-03-29
Payer: COMMERCIAL

## 2018-03-29 ENCOUNTER — ANESTHESIA EVENT (OUTPATIENT)
Dept: OBGYN | Facility: CLINIC | Age: 33
End: 2018-03-29
Payer: COMMERCIAL

## 2018-03-29 DIAGNOSIS — Z98.891 S/P REPEAT LOW TRANSVERSE C-SECTION: Primary | ICD-10-CM

## 2018-03-29 LAB
GLUCOSE BLDC GLUCOMTR-MCNC: 92 MG/DL (ref 70–99)
GLUCOSE BLDC GLUCOMTR-MCNC: 94 MG/DL (ref 70–99)
T PALLIDUM IGG+IGM SER QL: NEGATIVE

## 2018-03-29 PROCEDURE — 71000013 ZZH RECOVERY PHASE 1 LEVEL 1 EA ADDTL HR: Performed by: OBSTETRICS & GYNECOLOGY

## 2018-03-29 PROCEDURE — 25000128 H RX IP 250 OP 636: Performed by: OBSTETRICS & GYNECOLOGY

## 2018-03-29 PROCEDURE — 36000056 ZZH SURGERY LEVEL 3 1ST 30 MIN: Performed by: OBSTETRICS & GYNECOLOGY

## 2018-03-29 PROCEDURE — 25000125 ZZHC RX 250: Performed by: NURSE ANESTHETIST, CERTIFIED REGISTERED

## 2018-03-29 PROCEDURE — 25000125 ZZHC RX 250: Performed by: OBSTETRICS & GYNECOLOGY

## 2018-03-29 PROCEDURE — 71000012 ZZH RECOVERY PHASE 1 LEVEL 1 FIRST HR: Performed by: OBSTETRICS & GYNECOLOGY

## 2018-03-29 PROCEDURE — 37000009 ZZH ANESTHESIA TECHNICAL FEE, EACH ADDTL 15 MIN: Performed by: OBSTETRICS & GYNECOLOGY

## 2018-03-29 PROCEDURE — 37000008 ZZH ANESTHESIA TECHNICAL FEE, 1ST 30 MIN: Performed by: OBSTETRICS & GYNECOLOGY

## 2018-03-29 PROCEDURE — 25000128 H RX IP 250 OP 636: Performed by: NURSE ANESTHETIST, CERTIFIED REGISTERED

## 2018-03-29 PROCEDURE — 27210794 ZZH OR GENERAL SUPPLY STERILE: Performed by: OBSTETRICS & GYNECOLOGY

## 2018-03-29 PROCEDURE — 12000029 ZZH R&B OB INTERMEDIATE

## 2018-03-29 PROCEDURE — 00000146 ZZHCL STATISTIC GLUCOSE BY METER IP

## 2018-03-29 PROCEDURE — 36000058 ZZH SURGERY LEVEL 3 EA 15 ADDTL MIN: Performed by: OBSTETRICS & GYNECOLOGY

## 2018-03-29 PROCEDURE — 25000132 ZZH RX MED GY IP 250 OP 250 PS 637: Performed by: OBSTETRICS & GYNECOLOGY

## 2018-03-29 RX ORDER — CEFAZOLIN SODIUM 1 G/50ML
3 SOLUTION INTRAVENOUS
Status: COMPLETED | OUTPATIENT
Start: 2018-03-29 | End: 2018-03-29

## 2018-03-29 RX ORDER — MISOPROSTOL 200 UG/1
400 TABLET ORAL
Status: DISCONTINUED | OUTPATIENT
Start: 2018-03-29 | End: 2018-03-31 | Stop reason: HOSPADM

## 2018-03-29 RX ORDER — HYDROMORPHONE HYDROCHLORIDE 1 MG/ML
.3-.5 INJECTION, SOLUTION INTRAMUSCULAR; INTRAVENOUS; SUBCUTANEOUS EVERY 30 MIN PRN
Status: DISCONTINUED | OUTPATIENT
Start: 2018-03-29 | End: 2018-03-31 | Stop reason: HOSPADM

## 2018-03-29 RX ORDER — ACETAMINOPHEN 325 MG/1
975 TABLET ORAL EVERY 8 HOURS
Status: DISCONTINUED | OUTPATIENT
Start: 2018-03-29 | End: 2018-03-31 | Stop reason: HOSPADM

## 2018-03-29 RX ORDER — IBUPROFEN 800 MG/1
800 TABLET, FILM COATED ORAL EVERY 6 HOURS PRN
Status: DISCONTINUED | OUTPATIENT
Start: 2018-03-30 | End: 2018-03-31 | Stop reason: HOSPADM

## 2018-03-29 RX ORDER — CITRIC ACID/SODIUM CITRATE 334-500MG
30 SOLUTION, ORAL ORAL
Status: COMPLETED | OUTPATIENT
Start: 2018-03-29 | End: 2018-03-29

## 2018-03-29 RX ORDER — SODIUM CHLORIDE, SODIUM LACTATE, POTASSIUM CHLORIDE, CALCIUM CHLORIDE 600; 310; 30; 20 MG/100ML; MG/100ML; MG/100ML; MG/100ML
INJECTION, SOLUTION INTRAVENOUS CONTINUOUS
Status: DISCONTINUED | OUTPATIENT
Start: 2018-03-29 | End: 2018-03-31 | Stop reason: HOSPADM

## 2018-03-29 RX ORDER — LANOLIN 100 %
OINTMENT (GRAM) TOPICAL
Status: DISCONTINUED | OUTPATIENT
Start: 2018-03-29 | End: 2018-03-31 | Stop reason: HOSPADM

## 2018-03-29 RX ORDER — AMOXICILLIN 250 MG
1 CAPSULE ORAL 2 TIMES DAILY PRN
Status: DISCONTINUED | OUTPATIENT
Start: 2018-03-29 | End: 2018-03-31 | Stop reason: HOSPADM

## 2018-03-29 RX ORDER — ACETAMINOPHEN 325 MG/1
650 TABLET ORAL EVERY 4 HOURS PRN
Status: DISCONTINUED | OUTPATIENT
Start: 2018-04-01 | End: 2018-03-31 | Stop reason: HOSPADM

## 2018-03-29 RX ORDER — SODIUM CHLORIDE, SODIUM LACTATE, POTASSIUM CHLORIDE, CALCIUM CHLORIDE 600; 310; 30; 20 MG/100ML; MG/100ML; MG/100ML; MG/100ML
INJECTION, SOLUTION INTRAVENOUS CONTINUOUS
Status: DISCONTINUED | OUTPATIENT
Start: 2018-03-29 | End: 2018-03-29

## 2018-03-29 RX ORDER — IBUPROFEN 400 MG/1
400 TABLET, FILM COATED ORAL EVERY 6 HOURS PRN
Status: DISCONTINUED | OUTPATIENT
Start: 2018-03-30 | End: 2018-03-31 | Stop reason: HOSPADM

## 2018-03-29 RX ORDER — ONDANSETRON 2 MG/ML
4 INJECTION INTRAMUSCULAR; INTRAVENOUS EVERY 6 HOURS PRN
Status: DISCONTINUED | OUTPATIENT
Start: 2018-03-29 | End: 2018-03-31 | Stop reason: HOSPADM

## 2018-03-29 RX ORDER — OXYTOCIN 10 [USP'U]/ML
10 INJECTION, SOLUTION INTRAMUSCULAR; INTRAVENOUS
Status: DISCONTINUED | OUTPATIENT
Start: 2018-03-29 | End: 2018-03-31 | Stop reason: HOSPADM

## 2018-03-29 RX ORDER — FENTANYL CITRATE 50 UG/ML
INJECTION, SOLUTION INTRAMUSCULAR; INTRAVENOUS PRN
Status: DISCONTINUED | OUTPATIENT
Start: 2018-03-29 | End: 2018-03-29

## 2018-03-29 RX ORDER — HYDROCORTISONE 2.5 %
CREAM (GRAM) TOPICAL 3 TIMES DAILY PRN
Status: DISCONTINUED | OUTPATIENT
Start: 2018-03-29 | End: 2018-03-31 | Stop reason: HOSPADM

## 2018-03-29 RX ORDER — DIPHENHYDRAMINE HYDROCHLORIDE 50 MG/ML
25 INJECTION INTRAMUSCULAR; INTRAVENOUS EVERY 6 HOURS PRN
Status: DISCONTINUED | OUTPATIENT
Start: 2018-03-29 | End: 2018-03-31 | Stop reason: HOSPADM

## 2018-03-29 RX ORDER — NICOTINE POLACRILEX 4 MG
15-30 LOZENGE BUCCAL
Status: DISCONTINUED | OUTPATIENT
Start: 2018-03-29 | End: 2018-03-31 | Stop reason: HOSPADM

## 2018-03-29 RX ORDER — IBUPROFEN 600 MG/1
600 TABLET, FILM COATED ORAL EVERY 6 HOURS PRN
Status: DISCONTINUED | OUTPATIENT
Start: 2018-03-30 | End: 2018-03-31 | Stop reason: HOSPADM

## 2018-03-29 RX ORDER — OXYCODONE HYDROCHLORIDE 5 MG/1
5-10 TABLET ORAL
Status: DISCONTINUED | OUTPATIENT
Start: 2018-03-29 | End: 2018-03-31 | Stop reason: HOSPADM

## 2018-03-29 RX ORDER — OXYTOCIN/0.9 % SODIUM CHLORIDE 30/500 ML
340 PLASTIC BAG, INJECTION (ML) INTRAVENOUS CONTINUOUS PRN
Status: DISCONTINUED | OUTPATIENT
Start: 2018-03-29 | End: 2018-03-31 | Stop reason: HOSPADM

## 2018-03-29 RX ORDER — ONDANSETRON 2 MG/ML
INJECTION INTRAMUSCULAR; INTRAVENOUS PRN
Status: DISCONTINUED | OUTPATIENT
Start: 2018-03-29 | End: 2018-03-29

## 2018-03-29 RX ORDER — BISACODYL 10 MG
10 SUPPOSITORY, RECTAL RECTAL DAILY PRN
Status: DISCONTINUED | OUTPATIENT
Start: 2018-03-31 | End: 2018-03-31 | Stop reason: HOSPADM

## 2018-03-29 RX ORDER — DEXTROSE MONOHYDRATE 25 G/50ML
25-50 INJECTION, SOLUTION INTRAVENOUS
Status: DISCONTINUED | OUTPATIENT
Start: 2018-03-29 | End: 2018-03-31 | Stop reason: HOSPADM

## 2018-03-29 RX ORDER — KETOROLAC TROMETHAMINE 30 MG/ML
30 INJECTION, SOLUTION INTRAMUSCULAR; INTRAVENOUS EVERY 6 HOURS
Status: COMPLETED | OUTPATIENT
Start: 2018-03-29 | End: 2018-03-30

## 2018-03-29 RX ORDER — LIDOCAINE 40 MG/G
CREAM TOPICAL
Status: DISCONTINUED | OUTPATIENT
Start: 2018-03-29 | End: 2018-03-31 | Stop reason: HOSPADM

## 2018-03-29 RX ORDER — OXYTOCIN/0.9 % SODIUM CHLORIDE 30/500 ML
100 PLASTIC BAG, INJECTION (ML) INTRAVENOUS CONTINUOUS
Status: DISCONTINUED | OUTPATIENT
Start: 2018-03-29 | End: 2018-03-31 | Stop reason: HOSPADM

## 2018-03-29 RX ORDER — NALOXONE HYDROCHLORIDE 0.4 MG/ML
.1-.4 INJECTION, SOLUTION INTRAMUSCULAR; INTRAVENOUS; SUBCUTANEOUS
Status: DISCONTINUED | OUTPATIENT
Start: 2018-03-29 | End: 2018-03-31 | Stop reason: HOSPADM

## 2018-03-29 RX ORDER — MORPHINE SULFATE 1 MG/ML
INJECTION, SOLUTION EPIDURAL; INTRATHECAL; INTRAVENOUS PRN
Status: DISCONTINUED | OUTPATIENT
Start: 2018-03-29 | End: 2018-03-29

## 2018-03-29 RX ORDER — AMOXICILLIN 250 MG
2 CAPSULE ORAL 2 TIMES DAILY PRN
Status: DISCONTINUED | OUTPATIENT
Start: 2018-03-29 | End: 2018-03-31 | Stop reason: HOSPADM

## 2018-03-29 RX ORDER — SIMETHICONE 80 MG
80 TABLET,CHEWABLE ORAL 4 TIMES DAILY PRN
Status: DISCONTINUED | OUTPATIENT
Start: 2018-03-29 | End: 2018-03-31 | Stop reason: HOSPADM

## 2018-03-29 RX ORDER — OXYTOCIN/0.9 % SODIUM CHLORIDE 30/500 ML
PLASTIC BAG, INJECTION (ML) INTRAVENOUS PRN
Status: DISCONTINUED | OUTPATIENT
Start: 2018-03-29 | End: 2018-03-29

## 2018-03-29 RX ORDER — DIPHENHYDRAMINE HCL 25 MG
25 CAPSULE ORAL EVERY 6 HOURS PRN
Status: DISCONTINUED | OUTPATIENT
Start: 2018-03-29 | End: 2018-03-31 | Stop reason: HOSPADM

## 2018-03-29 RX ORDER — BUPIVACAINE HYDROCHLORIDE 7.5 MG/ML
INJECTION, SOLUTION INTRASPINAL PRN
Status: DISCONTINUED | OUTPATIENT
Start: 2018-03-29 | End: 2018-03-29

## 2018-03-29 RX ORDER — CEFAZOLIN SODIUM 2 G/100ML
2 INJECTION, SOLUTION INTRAVENOUS
Status: DISCONTINUED | OUTPATIENT
Start: 2018-03-29 | End: 2018-03-29 | Stop reason: DRUGHIGH

## 2018-03-29 RX ORDER — CEFAZOLIN SODIUM 1 G/3ML
1 INJECTION, POWDER, FOR SOLUTION INTRAMUSCULAR; INTRAVENOUS SEE ADMIN INSTRUCTIONS
Status: CANCELLED | OUTPATIENT
Start: 2018-03-29

## 2018-03-29 RX ADMIN — PHENYLEPHRINE HYDROCHLORIDE 100 MCG: 10 INJECTION, SOLUTION INTRAMUSCULAR; INTRAVENOUS; SUBCUTANEOUS at 11:24

## 2018-03-29 RX ADMIN — PHENYLEPHRINE HYDROCHLORIDE 100 MCG: 10 INJECTION, SOLUTION INTRAMUSCULAR; INTRAVENOUS; SUBCUTANEOUS at 11:20

## 2018-03-29 RX ADMIN — OXYTOCIN-SODIUM CHLORIDE 0.9% IV SOLN 30 UNIT/500ML 100 ML/HR: 30-0.9/5 SOLUTION at 18:21

## 2018-03-29 RX ADMIN — OXYTOCIN-SODIUM CHLORIDE 0.9% IV SOLN 30 UNIT/500ML 100 ML/HR: 30-0.9/5 SOLUTION at 13:24

## 2018-03-29 RX ADMIN — KETOROLAC TROMETHAMINE 30 MG: 30 INJECTION, SOLUTION INTRAMUSCULAR at 20:18

## 2018-03-29 RX ADMIN — SODIUM CHLORIDE, POTASSIUM CHLORIDE, SODIUM LACTATE AND CALCIUM CHLORIDE: 600; 310; 30; 20 INJECTION, SOLUTION INTRAVENOUS at 11:35

## 2018-03-29 RX ADMIN — SODIUM CHLORIDE, POTASSIUM CHLORIDE, SODIUM LACTATE AND CALCIUM CHLORIDE: 600; 310; 30; 20 INJECTION, SOLUTION INTRAVENOUS at 23:53

## 2018-03-29 RX ADMIN — FENTANYL CITRATE 20 MCG: 50 INJECTION, SOLUTION INTRAMUSCULAR; INTRAVENOUS at 11:15

## 2018-03-29 RX ADMIN — PHENYLEPHRINE HYDROCHLORIDE 100 MCG: 10 INJECTION, SOLUTION INTRAMUSCULAR; INTRAVENOUS; SUBCUTANEOUS at 11:22

## 2018-03-29 RX ADMIN — ONDANSETRON 4 MG: 2 INJECTION INTRAMUSCULAR; INTRAVENOUS at 11:51

## 2018-03-29 RX ADMIN — OXYTOCIN-SODIUM CHLORIDE 0.9% IV SOLN 30 UNIT/500ML 1 ML: 30-0.9/5 SOLUTION at 11:49

## 2018-03-29 RX ADMIN — KETOROLAC TROMETHAMINE 30 MG: 30 INJECTION, SOLUTION INTRAMUSCULAR at 13:40

## 2018-03-29 RX ADMIN — MORPHINE SULFATE 0.4 MG: 1 INJECTION EPIDURAL; INTRATHECAL; INTRAVENOUS at 11:15

## 2018-03-29 RX ADMIN — PHENYLEPHRINE HYDROCHLORIDE 100 MCG: 10 INJECTION, SOLUTION INTRAMUSCULAR; INTRAVENOUS; SUBCUTANEOUS at 11:40

## 2018-03-29 RX ADMIN — ACETAMINOPHEN 975 MG: 325 TABLET, FILM COATED ORAL at 13:38

## 2018-03-29 RX ADMIN — ACETAMINOPHEN 975 MG: 325 TABLET, FILM COATED ORAL at 22:04

## 2018-03-29 RX ADMIN — PHENYLEPHRINE HYDROCHLORIDE 100 MCG: 10 INJECTION, SOLUTION INTRAMUSCULAR; INTRAVENOUS; SUBCUTANEOUS at 11:30

## 2018-03-29 RX ADMIN — PHENYLEPHRINE HYDROCHLORIDE 100 MCG: 10 INJECTION, SOLUTION INTRAMUSCULAR; INTRAVENOUS; SUBCUTANEOUS at 11:32

## 2018-03-29 RX ADMIN — SODIUM CITRATE AND CITRIC ACID MONOHYDRATE 30 ML: 500; 334 SOLUTION ORAL at 11:01

## 2018-03-29 RX ADMIN — SODIUM CHLORIDE, POTASSIUM CHLORIDE, SODIUM LACTATE AND CALCIUM CHLORIDE 1000 ML: 600; 310; 30; 20 INJECTION, SOLUTION INTRAVENOUS at 10:15

## 2018-03-29 RX ADMIN — OXYTOCIN-SODIUM CHLORIDE 0.9% IV SOLN 30 UNIT/500ML 299 ML: 30-0.9/5 SOLUTION at 12:34

## 2018-03-29 RX ADMIN — CEFAZOLIN SODIUM 3 G: 10 INJECTION, POWDER, FOR SOLUTION INTRAVENOUS at 11:10

## 2018-03-29 RX ADMIN — SODIUM CHLORIDE, POTASSIUM CHLORIDE, SODIUM LACTATE AND CALCIUM CHLORIDE: 600; 310; 30; 20 INJECTION, SOLUTION INTRAVENOUS at 11:10

## 2018-03-29 RX ADMIN — BUPIVACAINE HYDROCHLORIDE IN DEXTROSE 13.5 MG: 7.5 INJECTION, SOLUTION SUBARACHNOID at 11:15

## 2018-03-29 NOTE — BRIEF OP NOTE
[facility] Brief Operative Note    Pre-operative diagnosis: 1. IUP at 39+1  2. previous c/s, desires repeat  3. Maternal obesity  4. Polyhydramnios  5. GDMA1   Post-operative diagnosis Same   Procedure: Procedure(s):  Repeat  Section - Wound Class: II-Clean Contaminated   Surgeon(s): Surgeon(s) and Role:     * Fern Nicolas MD - Primary   Estimated blood loss: 600cc    Specimens:   ID Type Source Tests Collected by Time Destination   1 :  Cord blood Blood OR DOCUMENTATION ONLY Fern Nicolas MD 3/29/2018 11:51 AM       Findings: Large amount of clear fluid at amniotomy  Viable male infant, weight 8 lbs 10 oz, Apgars 9&9  Normal female PP anatomy

## 2018-03-29 NOTE — IP AVS SNAPSHOT
MRN:[id number]                      After Visit Summary   3/29/2018    Eloina Aj    MRN: [id number]           Thank you!     Thank you for choosing [facility] for your care. Our goal is always to provide you with excellent care. Hearing back from our patients is one way we can continue to improve our services. Please take a few minutes to complete the written survey that you may receive in the mail after you visit. If you would like to speak to someone directly about your visit please contact Patient Relations at [phone]. Thank you!          Patient Information     Date Of Birth          1985        Designated Caregiver       Most Recent Value    Caregiver    Will someone help with your care after discharge? yes    Name of designated caregiver Fidel    Phone number of caregiver     Caregiver address same address      About your hospital stay     You were admitted on:  2018 You last received care in the:  [facility]    You were discharged on:  2018       Who to Call     For medical emergencies, please call 911.  For non-urgent questions about your medical care, please call your primary care provider or clinic, [phone]  For questions related to your surgery, please call your surgery clinic        Attending Provider     Provider Specialty    Fern Nicolas MD OB/Gyn       Primary Care Provider Office Phone # Fax #    Tasneem Jones [phone]      After Care Instructions     Activity       Review discharge instructions            Diet       Resume previous diet            Discharge Instructions - Postpartum visit       Schedule postpartum visit with your provider and return to clinic in 6 weeks.                  Further instructions from your care team       Postop  Birth Instructions Schedule appointment in 6 weeks with your OB.   Lactation .  Home care [phone]  [phone]    Activity       Do not lift more than 10 pounds for 6 weeks after surgery.  Ask family and friends for help when you need it.    No driving until you have stopped taking your pain medications (usually two weeks after surgery).    No heavy exercise or activity for 6 weeks.  Don't do anything that will put a strain on your surgery site.    Don't strain when using the toilet.  Your care team may prescribe a stool softener if you have problems with your bowel movements.     To care for your incision:       Keep the incision clean and dry.    Do not soak your incision in water. No swimming or hot tubs until it has fully healed. You may soak in the bathtub if the water level is below your incision.    Do not use peroxide, gel, cream, lotion, or ointment on your incision.    Adjust your clothes to avoid pressure on your surgery site (check the elastic in your underwear for example).     You may see a small amount of clear or pink drainage and this is normal.  Check with your health care provider:       If the drainage increases or has an odor.    If the incision reddens, you have swelling, or develop a rash.    If you have increased pain and the medicine we prescribed doesn't help.    If you have a fever above 100.4 F (38 C) with or without chills when placing thermometer under your tongue.   The area around your incision (surgery wound), will feel numb.  This is normal. The numbness should go away in less than a year.     Keep your hands clean:  Always wash your hands before touching your incision (surgery wound). This helps reduce your risk of infection. If your hands aren't dirty, you may use an alcohol hand-rub to clean your hands. Keep your nails clean and short.    Call your healthcare provider if you have any of these symptoms:       You soak a sanitary pad with blood within 1 hour, or you see blood clots larger than a golf ball.    Bleeding that lasts more than 6 weeks.    Vaginal discharge that smells  "bad.    Severe pain, cramping or tenderness in your lower belly area.    A need to urinate more frequently (use the toilet more often), more urgently (use the toilet very quickly), or it burns when you urinate.    Nausea and vomiting.    Redness, swelling or pain around a vein in your leg.    Problems breastfeeding or a red or painful area on your breast.    Chest pain and cough or are gasping for air.    Problems with coping with sadness, anxiety or depression. If you have concerns about hurting yourself or the baby, call your provider immediately.      You have questions or concerns after you return home.                  Pending Results     No orders found from 3/27/2018 to 3/30/2018.            Statement of Approval     Ordered          03/31/18 1024  I have reviewed and agree with all the recommendations and orders detailed in this document.  EFFECTIVE NOW     Approved and electronically signed by:  Sonja Johns MD             Admission Information     Date & Time Provider Department Dept. Phone    3/29/2018 Fern Nicolas MD [facility] [phone]      Your Vitals Were     Blood Pressure Pulse Temperature Respirations Height Weight    105/57 62 98.6  F (37  C) (Oral) 18 1.626 m (5' 4\") 122.5 kg (270 lb)    Pulse Oximetry BMI (Body Mass Index)                98% 46.35 kg/m2          MyChart Information     [employer] lets you send messages to your doctor, view your test results, renew your prescriptions, schedule appointments and more. To sign up, go to [email or web address] . Click on \"Log in\" on the left side of the screen, which will take you to the Welcome page. Then click on \"Sign up Now\" on the right side of the page.     You will be asked to enter the access code listed below, as well as some personal information. Please follow the directions to create your username and password.     Your access code is: [email or web address]  Expires: 4/29/2018 12:07 PM     Your access code will "  in 90 days. If you need help or a new code, please call your [facility] clinic or [phone].        Care EveryWhere ID     This is your Care EveryWhere ID. This could be used by other organizations to access your [facility] medical records  [id number]        Equal Access to Services     COLINWILLA ALLI : Trang reynolds hadsanamo Soomaali, waaxda luqadaha, qaybta kaalmada adeegyada, mary seymourcarminaken dickens. So [facility] [phone].    ATENCIÓN: Si habla español, tiene a marrufo disposición servicios gratuitos de asistencia lingüística. Jorje al [phone].    We comply with applicable federal civil rights laws and [place] laws. We do not discriminate on the basis of race, color, national origin, age, disability, sex, sexual orientation, or gender identity.               Review of your medicines      START taking        Dose / Directions    oxyCODONE IR 5 MG tablet   Commonly known as:  ROXICODONE        Dose:  5 mg   Take 1 tablet (5 mg) by mouth every 6 hours as needed for severe pain   Quantity:  14 tablet   Refills:  0         CONTINUE these medicines which have NOT CHANGED        Dose / Directions    LEVOTHYROXINE SODIUM PO        Dose:  75 mcg   Take 75 mcg by mouth daily   Refills:  0       prenatal multivitamin plus iron 27-0.8 MG Tabs per tablet        Dose:  1 tablet   Take 1 tablet by mouth daily   Refills:  0            Where to get your medicines      Some of these will need a paper prescription and others can be bought over the counter. Ask your nurse if you have questions.     Bring a paper prescription for each of these medications     oxyCODONE IR 5 MG tablet                Protect others around you: Learn how to safely use, store and throw away your medicines at www.disposemymeds.org.        Information about OPIOIDS     PRESCRIPTION OPIOIDS: WHAT YOU NEED TO KNOW    Prescription opioids can be used to help relieve moderate to severe pain and are often prescribed following a surgery or  injury, or for certain health conditions. These medications can be an important part of treatment but also come with serious risks. It is important to work with your health care provider to make sure you are getting the safest, most effective care.    WHAT ARE THE RISKS AND SIDE EFFECTS OF OPIOID USE?  Prescription opioids carry serious risks of addiction and overdose, especially with prolonged use. An opioid overdose, often marked by slowed breathing can cause sudden death. The use of prescription opioids can have a number of side effects as well, even when taken as directed:      Tolerance - meaning you might need to take more of a medication for the same pain relief    Physical dependence - meaning you have symptoms of withdrawal when a medication is stopped    Increased sensitivity to pain    Constipation    Nausea, vomiting, and dry mouth    Sleepiness and dizziness    Confusion    Depression    Low levels of testosterone that can result in lower sex drive, energy, and strength    Itching and sweating    RISKS ARE GREATER WITH:    History of drug misuse, substance use disorder, or overdose    Mental health conditions (such as depression or anxiety)    Sleep apnea    Older age (65 years or older)    Pregnancy    Avoid alcohol while taking prescription opioids.   Also, unless specifically advised by your health care provider, medications to avoid include:    Benzodiazepines (such as Xanax or Valium)    Muscle relaxants (such as Soma or Flexeril)    Hypnotics (such as Ambien or Lunesta)    Other prescription opioids    KNOW YOUR OPTIONS:  Talk to your health care provider about ways to manage your pain that do not involve prescription opioids. Some of these options may actually work better and have fewer risks and side effects:    Pain relievers such as acetaminophen, ibuprofen, and naproxen    Some medications that are also used for depression or seizures    Physical therapy and exercise    Cognitive behavioral  therapy, a psychological, goal-directed approach, in which patients learn how to modify physical, behavioral, and emotional triggers of pain and stress    IF YOU ARE PRESCRIBED OPIOIDS FOR PAIN:    Never take opioids in greater amounts or more often than prescribed    Follow up with your primary health care provider and work together to create a plan on how to manage your pain.    Talk about ways to help manage your pain that do not involve prescription opioids    Talk about all concerns and side effects    Help prevent misuse and abuse    Never sell or share prescription opioids    Never use another person's prescription opioids    Store prescription opioids in a secure place and out of reach of others (this may include visitors, children, friends, and family)    Visit www.cdc.gov/drugoverdose to learn about risks of opioid abuse and overdose    If you believe you may be struggling with addiction, tell your health care provider and ask for guidance or call [facility]'s National Helpline at 2-633-788-HELP    LEARN MORE / www.cdc.gov/drugoverdose/prescribing/guideline.html    Safely dispose of unused prescription opioids: Find your local drug take-back programs and more information about the importance of safe disposal at www.doseofreality.mn.gov             Medication List: This is a list of all your medications and when to take them. Check marks below indicate your daily home schedule. Keep this list as a reference.      Medications           Morning Afternoon Evening Bedtime As Needed    LEVOTHYROXINE SODIUM PO   Take 75 mcg by mouth daily   Last time this was given:  75 mcg on 3/31/2018  6:13 AM                                oxyCODONE IR 5 MG tablet   Commonly known as:  ROXICODONE   Take 1 tablet (5 mg) by mouth every 6 hours as needed for severe pain                                prenatal multivitamin plus iron 27-0.8 MG Tabs per tablet   Take 1 tablet by mouth daily

## 2018-03-29 NOTE — IP AVS SNAPSHOT
[facility]    [address]    [facility] [address]    Phone:  [phone]    Fax:  [phone]                                       After Visit Summary   3/29/2018    Eloina Aj    MRN: [id number]           After Visit Summary Signature Page     I have received my discharge instructions, and my questions have been answered. I have discussed any challenges I see with this plan with the nurse or doctor.    ..........................................................................................................................................  Patient/Patient Representative Signature      ..........................................................................................................................................  Patient Representative Print Name and Relationship to Patient    ..................................................               ................................................  Date                                            Time    ..........................................................................................................................................  Reviewed by Signature/Title    ...................................................              ..............................................  Date                                                            Time

## 2018-03-29 NOTE — ANESTHESIA PREPROCEDURE EVALUATION
PAC NOTE:       ANESTHESIA PRE EVALUATION:  Anesthesia Evaluation     .             ROS/MED HX    ENT/Pulmonary:  - neg pulmonary ROS     Neurologic:  - neg neurologic ROS     Cardiovascular:  - neg cardiovascular ROS       METS/Exercise Tolerance:     Hematologic:  - neg hematologic  ROS       Musculoskeletal:  - neg musculoskeletal ROS       GI/Hepatic:  - neg GI/hepatic ROS       Renal/Genitourinary:  - ROS Renal section negative       Endo: Comment: .Body mass index is 46.35 kg/(m^2).      (+) thyroid problem Obesity, .      Psychiatric:     (+) psychiatric history depression      Infectious Disease:  - neg infectious disease ROS       Malignancy:         Other: Comment: .Lab Test        18                       0908          HGB          12.1           No lab results found.                      Physical Exam  Normal systems: cardiovascular and pulmonary    Airway   Mallampati: II    Dental     Cardiovascular   Rhythm and rate: regular and normal      Pulmonary    breath sounds clear to auscultation             Anesthesia Plan      History & Physical Review  History and physical reviewed and following examination; no interval change.    ASA Status:  3 .        Plan for Spinal   PONV prophylaxis:  Ondansetron (or other 5HT-3) and Scopolamine patch  Previous       Postoperative Care  Postoperative pain management:  IV analgesics, Oral pain medications, Multi-modal analgesia and Neuraxial analgesia.      Consents  Anesthetic plan, risks, benefits and alternatives discussed with:  Patient or representative..                            .

## 2018-03-29 NOTE — ANESTHESIA CARE TRANSFER NOTE
Patient: Eloina Aj    Procedure(s):  Repeat  Section - Wound Class: II-Clean Contaminated    Diagnosis: previous c/s  Diagnosis Additional Information: No value filed.    Anesthesia Type:   Spinal     Note:  Airway :Room Air  Patient transferred to:Labor and Delivery  Handoff Report: Identifed the Patient, Identified the Reponsible Provider, Reviewed the pertinent medical history, Discussed the surgical course, Reviewed Intra-OP anesthesia mangement and issues during anesthesia, Set expectations for post-procedure period and Allowed opportunity for questions and acknowledgement of understanding      Vitals: (Last set prior to Anesthesia Care Transfer)    CRNA VITALS  3/29/2018 1209 - 3/29/2018 1244      3/29/2018             Pulse: 78    SpO2: 96 %                Electronically Signed By: NATHANAEL Stack CRNA  2018  12:44 PM

## 2018-03-29 NOTE — ANESTHESIA PROCEDURE NOTES
Peripheral nerve/Neuraxial procedure note : intrathecal  Pre-Procedure  Performed by SHASHI HOPKINS  Location: OR      Pre-Anesthestic Checklist: patient identified, IV checked, risks and benefits discussed, informed consent, monitors and equipment checked, pre-op evaluation and at physician/surgeon's request    Timeout  Correct Patient: Yes   Correct Procedure: Yes   Correct Site: Yes   Correct Laterality: N/A   Correct Position: Yes   Site Marked: N/A   .   Procedure Documentation    .    Procedure:    Intrathecal.  Insertion Site:L2-3  (midline approach)      Patient Prep;mask, sterile gloves, povidone-iodine 7.5% surgical scrub.  .  Needle: Sprotte Spinal Needle (gauge): 24  Spinal/LP Needle Length (inches): 3.5 # of attempts: 1 and # of redirects:  Introducer used .       Assessment/Narrative  Paresthesias: No.  .  .  clear CSF fluid removed . Comments:  .Bupivicaine 13.5mg + Fentanyl 20mcg + Morphine 0.4mg    EMERSON Hopkins

## 2018-03-29 NOTE — PLAN OF CARE
Data: Eloina Aj transferred to 448 via CART at 1550. Baby transferred via parent's arms.  Action: Receiving unit notified of transfer: Yes. Patient and family notified of room change. Report given to Sonia SCHAFFER at 1525. Belongings sent to receiving unit. Accompanied by Registered Nurse. Oriented patient to surroundings. Call light within reach. ID bands double-checked with receiving RN.  Response: Patient tolerated transfer and is stable.

## 2018-03-29 NOTE — ANESTHESIA POSTPROCEDURE EVALUATION
Patient: Eloina Aj    Procedure(s):  Repeat  Section - Wound Class: II-Clean Contaminated    Diagnosis:previous c/s  Diagnosis Additional Information: 1. IUP at 39+1  2. previous c/s, desires repeat  3. Maternal obesity  4. Polyhydramnios  5. GDMA1    Delivered infant    Anesthesia Type:  Spinal    Note:  Anesthesia Post Evaluation    Patient location during evaluation: PACU  Patient participation: Able to fully participate in evaluation  Level of consciousness: awake  Pain management: adequate  Airway patency: patent  Cardiovascular status: acceptable  Respiratory status: acceptable  Hydration status: acceptable  PONV: controlled     Anesthetic complications: None    Comments: .Anticipate full return of neurologic function          Last vitals:  Vitals:    18 1240 18 1245 18 1252   BP:  98/54    Resp:      Temp:   97.7  F (36.5  C)   SpO2: 94%           Electronically Signed By: Yemi Hopkins DO  2018  1:24 PM

## 2018-03-30 LAB
GLUCOSE BLDC GLUCOMTR-MCNC: 81 MG/DL (ref 70–99)
HGB BLD-MCNC: 9 G/DL (ref 11.7–15.7)

## 2018-03-30 PROCEDURE — 25000132 ZZH RX MED GY IP 250 OP 250 PS 637: Performed by: OBSTETRICS & GYNECOLOGY

## 2018-03-30 PROCEDURE — 25000128 H RX IP 250 OP 636: Performed by: OBSTETRICS & GYNECOLOGY

## 2018-03-30 PROCEDURE — 85018 HEMOGLOBIN: CPT | Performed by: OBSTETRICS & GYNECOLOGY

## 2018-03-30 PROCEDURE — 00000146 ZZHCL STATISTIC GLUCOSE BY METER IP

## 2018-03-30 PROCEDURE — 36415 COLL VENOUS BLD VENIPUNCTURE: CPT | Performed by: OBSTETRICS & GYNECOLOGY

## 2018-03-30 PROCEDURE — 12000029 ZZH R&B OB INTERMEDIATE

## 2018-03-30 RX ORDER — LEVOTHYROXINE SODIUM 75 UG/1
75 TABLET ORAL DAILY
Status: DISCONTINUED | OUTPATIENT
Start: 2018-03-30 | End: 2018-03-31 | Stop reason: HOSPADM

## 2018-03-30 RX ORDER — FERROUS SULFATE 325(65) MG
325 TABLET ORAL
Status: DISCONTINUED | OUTPATIENT
Start: 2018-03-30 | End: 2018-03-31 | Stop reason: HOSPADM

## 2018-03-30 RX ADMIN — SENNOSIDES AND DOCUSATE SODIUM 2 TABLET: 8.6; 5 TABLET ORAL at 21:24

## 2018-03-30 RX ADMIN — KETOROLAC TROMETHAMINE 30 MG: 30 INJECTION, SOLUTION INTRAMUSCULAR at 02:21

## 2018-03-30 RX ADMIN — KETOROLAC TROMETHAMINE 30 MG: 30 INJECTION, SOLUTION INTRAMUSCULAR at 08:57

## 2018-03-30 RX ADMIN — LEVOTHYROXINE SODIUM 75 MCG: 75 TABLET ORAL at 09:18

## 2018-03-30 RX ADMIN — ACETAMINOPHEN 975 MG: 325 TABLET, FILM COATED ORAL at 21:58

## 2018-03-30 RX ADMIN — IBUPROFEN 800 MG: 800 TABLET ORAL at 21:23

## 2018-03-30 RX ADMIN — FERROUS SULFATE TAB 325 MG (65 MG ELEMENTAL FE) 325 MG: 325 (65 FE) TAB at 21:24

## 2018-03-30 RX ADMIN — ACETAMINOPHEN 975 MG: 325 TABLET, FILM COATED ORAL at 06:12

## 2018-03-30 RX ADMIN — IBUPROFEN 800 MG: 800 TABLET ORAL at 15:40

## 2018-03-30 RX ADMIN — ACETAMINOPHEN 975 MG: 325 TABLET, FILM COATED ORAL at 14:13

## 2018-03-30 NOTE — PROGRESS NOTES
NUTRITION ASSESSMENT    REASON FOR ASSESSMENT:    Admission Nutrition Risk Screen for  New/Uncontrolled Diabetes     CURRENT DIET AND NOURISHMENT ORDER:  Information obtained from chart review    Diet: ADAT, clear liquid    Current Intake/Tolerance: No intake documented    NUTRITION STATUS VALIDATION:  Weight status: Obesity Grade III BMI >40    MALNUTRITION:  Patient does not meet two of the following criteria necessary for diagnosing malnutrition: significant weight loss, reduced intake, subcutaneous fat loss, muscle loss or fluid retention    NUTRITION DIAGNOSIS:  No nutrition diagnosis at this time.    INTERVENTION:    Nutrition Prescription:     Gestational diabetes intervention not indicated as patient has already delivered.    Anticipate patient will discharge on regular diet    Implementation:      Diet education - per MD order or as appropriate    Follow Up/Monitoring:      No need for further follow up unless another consult is received.      STEPHANIE Trejo  3rd floor/ICU: [phone]  All other floors: [phone]  Weekend/holiday: [phone]  Office: [phone]

## 2018-03-30 NOTE — PLAN OF CARE
Problem: Patient Care Overview  Goal: Plan of Care/Patient Progress Review  Outcome: Improving  Pt. VSS. Pain managed with scheduled tylenol and toradol. Island dressing marked due to small amount of drainage. Crain draining adequate amounts of concentrated urine. Pt. requires some assistance with personal and infant cares. Breastfeeding infant. Attentive to infant needs and bonding well with infant. Will check BG at 0600.

## 2018-03-30 NOTE — PLAN OF CARE
Problem: Patient Care Overview  Goal: Plan of Care/Patient Progress Review  Outcome: Improving  BP readings in the lower range 87/37,  93/45, patient has been up to bathroom , no dizziness, matthews catheter removed and has voided times once without difficulty. Patient plans to walk hallways. Oral analgesia given for incisional pain. Dressing removed by MD and steri strips intact. Patient is tolerating regular diet , active BS and passing gas pr. Stable post c/section patient.

## 2018-03-30 NOTE — PLAN OF CARE
"Problem: Patient Care Overview  Goal: Plan of Care/Patient Progress Review  Outcome: Improving  Doing well. Out of bed with assist, ambulated in halls, tolerated well. Taking clear liquids and toast, denies nausea. Breast feeding independently. Scheduled tylenol and scheduled toradol for pain she rates no higher than \"2\". FOB present and supportive, participating in baby cares.        "

## 2018-03-30 NOTE — PROGRESS NOTES
"OB Post-op  Section Progress Note POD# 2    S:  Patient doing well.  Pain well controlled with PO pain medication.  Ambulating.  Tolerating regular diet.  No N/V.  Passing flatus. Voiding.  Bleeding is normal.  Breastfeeding..    O:  BP 92/44  Pulse 62  Temp 97.9  F (36.6  C) (Oral)  Resp 16  Ht 1.626 m (5' 4\")  Wt 122.5 kg (270 lb)  SpO2 99%  Breastfeeding? Unknown  BMI 46.35 kg/m2  UOP- Since [place] 1050cc  Gen- A&O, NAD  Lungs- CTAB  CV-RRR  Abd- soft, non-tender, +BS, no rebound or guarding, fundus firm at umbilicus  Incision- C/D/I, steristrips in place, bandage removed  Ext- non-tender, no edema. Pneumoboots in place lower extremities    Hemoglobin   Date Value Ref Range Status   2018 9.0 (L) 11.7 - 15.7 g/dL Final       AB +  Rubella immune    A/P:  32 year old  POD# 1 s/p scheduled repeat LTCS  Pregnancy complications: A1GDM, Hypothryoid, Obesity, h/o anxiety & depression    1.  Routine post-op cares  2.  Analgesia - switch to PO meds today  3.  Acute blood loss anemia - will start daily iron  4.  A1GDM: normal fasting BG was normal, recheck fasting and 2hr GTT 6 weeks PP  5.  Hypothryoid: Resume synthroid  6.  Plan d/c POD#3      Edgar Reeder DO  2018  [phone]     "

## 2018-03-31 VITALS
SYSTOLIC BLOOD PRESSURE: 105 MMHG | DIASTOLIC BLOOD PRESSURE: 57 MMHG | HEART RATE: 62 BPM | OXYGEN SATURATION: 98 % | RESPIRATION RATE: 18 BRPM | HEIGHT: 64 IN | TEMPERATURE: 98.6 F | BODY MASS INDEX: 46.1 KG/M2 | WEIGHT: 270 LBS

## 2018-03-31 PROCEDURE — 25000132 ZZH RX MED GY IP 250 OP 250 PS 637: Performed by: OBSTETRICS & GYNECOLOGY

## 2018-03-31 RX ORDER — OXYCODONE HYDROCHLORIDE 5 MG/1
5 TABLET ORAL EVERY 6 HOURS PRN
Qty: 14 TABLET | Refills: 0 | Status: SHIPPED | OUTPATIENT
Start: 2018-03-31 | End: 2018-06-04

## 2018-03-31 RX ADMIN — ACETAMINOPHEN 975 MG: 325 TABLET, FILM COATED ORAL at 06:13

## 2018-03-31 RX ADMIN — ACETAMINOPHEN 975 MG: 325 TABLET, FILM COATED ORAL at 15:05

## 2018-03-31 RX ADMIN — LEVOTHYROXINE SODIUM 75 MCG: 75 TABLET ORAL at 06:13

## 2018-03-31 RX ADMIN — SENNOSIDES AND DOCUSATE SODIUM 2 TABLET: 8.6; 5 TABLET ORAL at 09:54

## 2018-03-31 RX ADMIN — IBUPROFEN 800 MG: 800 TABLET ORAL at 03:48

## 2018-03-31 RX ADMIN — IBUPROFEN 800 MG: 800 TABLET ORAL at 09:55

## 2018-03-31 NOTE — PROVIDER NOTIFICATION
03/31/18 1000   Provider Notification   Provider Name/Title DR Aj   Method of Notification Phone   Notification Reason Other   Comments   Comments Patient would like to diacharge today    DR Johns will put in discharge orders.

## 2018-03-31 NOTE — PLAN OF CARE
Problem: Patient Care Overview  Goal: Plan of Care/Patient Progress Review  Outcome: Improving    Pt is up and ambulating in the room. Pt showered this shift. Has steri strips on incisions. Discussed how to do  incision care. Pt verbalized may want discharge tomorrow. Instructed to fill out birth certificate, depression scale.  Pt already watched DVD and hand milk expression.  Independent with self and infant care.

## 2018-03-31 NOTE — DISCHARGE INSTRUCTIONS
Postop  Birth Instructions Schedule appointment in 6 weeks with your OB.   Lactation . [facility]     Activity       Do not lift more than 10 pounds for 6 weeks after surgery.  Ask family and friends for help when you need it.    No driving until you have stopped taking your pain medications (usually two weeks after surgery).    No heavy exercise or activity for 6 weeks.  Don't do anything that will put a strain on your surgery site.    Don't strain when using the toilet.  Your care team may prescribe a stool softener if you have problems with your bowel movements.     To care for your incision:       Keep the incision clean and dry.    Do not soak your incision in water. No swimming or hot tubs until it has fully healed. You may soak in the bathtub if the water level is below your incision.    Do not use peroxide, gel, cream, lotion, or ointment on your incision.    Adjust your clothes to avoid pressure on your surgery site (check the elastic in your underwear for example).     You may see a small amount of clear or pink drainage and this is normal.  Check with your health care provider:       If the drainage increases or has an odor.    If the incision reddens, you have swelling, or develop a rash.    If you have increased pain and the medicine we prescribed doesn't help.    If you have a fever above 100.4 F (38 C) with or without chills when placing thermometer under your tongue.   The area around your incision (surgery wound), will feel numb.  This is normal. The numbness should go away in less than a year.     Keep your hands clean:  Always wash your hands before touching your incision (surgery wound). This helps reduce your risk of infection. If your hands aren't dirty, you may use an alcohol hand-rub to clean your hands. Keep your nails clean and short.    Call your healthcare provider if you have any of these symptoms:       You soak a sanitary pad with blood within 1 hour,  or you see blood clots larger than a golf ball.    Bleeding that lasts more than 6 weeks.    Vaginal discharge that smells bad.    Severe pain, cramping or tenderness in your lower belly area.    A need to urinate more frequently (use the toilet more often), more urgently (use the toilet very quickly), or it burns when you urinate.    Nausea and vomiting.    Redness, swelling or pain around a vein in your leg.    Problems breastfeeding or a red or painful area on your breast.    Chest pain and cough or are gasping for air.    Problems with coping with sadness, anxiety or depression. If you have concerns about hurting yourself or the baby, call your provider immediately.      You have questions or concerns after you return home.

## 2018-03-31 NOTE — PLAN OF CARE
Problem: Patient Care Overview  Goal: Plan of Care/Patient Progress Review  Outcome: Improving  Pt. VSS. Pain managed with tylenol and ibuprofen. Steri strips intact, incision well-approximated, without signs of infection. Voiding without difficulty. Independent in personal and infant cares. Breastfeeding infant. Attentive to infant needs and bonding well with infant. Pt. requesting early discharge today, 3/31.

## 2018-03-31 NOTE — PLAN OF CARE
Problem: Patient Care Overview  Goal: Plan of Care/Patient Progress Review  Outcome: Adequate for Discharge Date Met: 03/31/18  Data: Vital signs within normal limits. Postpartum checks within normal limits - see flow record. Patient eating and drinking normally. Patient able to empty bladder independently and is up ambulating. No apparent signs of infection. Incision healing well.Steri strips to incision intact advised to take off if not fallen off in one week. Patient performing self cares and is able to care for infant.  Action: Patient medicated during the shift for pain. See MAR. Patient reassessed within 1 hour after each medication and pain was improved - patient stated she was comfortable. Patient education done about. See flow record.  Response: Positive attachment behaviors observed with infant. Support persons spouse will be here later present.   Plan: Anticipate discharge today, patient has expressed desire to discharge today before 72 hours, DR Anaya has signed script for oxycodone if patient wants to leave today and DR Johns has put in discharge orders, patient to take OTC Tylenol and ibuprofen, colace as needed and continue with iron supplementation..    All discharge teaching completed no further questions will be ready to leave unit once ride here.Left unit at 15.18

## 2018-04-05 NOTE — DISCHARGE SUMMARY
Admit Date:     2018   Discharge Date:     2018      DATE OF ADMISSION:   2018      DATE OF DISCHARGE:   2018      DISCHARGE DIAGNOSES:   1.  Intrauterine pregnancy at 39 weeks 1 day gestation, delivered.   2.  Type 1 gestational diabetes.   3.  Maternal obesity.      PRINCIPAL PROCEDURE:  Repeat low-transverse  section.      DISCHARGE MEDICATIONS:   1.  Oxycodone 5 mg, take 1 tablet by mouth every 6 hours as needed for severe pain.   2.  Levothyroxine 75 mcg by mouth daily.   3.  Prenatal 1 p.o. daily.      DISCHARGE FOLLOWUP:  The patient is to follow up in 6 weeks for postpartum visit.      DISCHARGE INSTRUCTIONS:  The patient is to call for temperature greater than 100.4, nausea, vomiting, redness or drainage from incision site or heavy vaginal bleeding.      HISTORY OF PRESENT ILLNESS:  Ms. Aj is a very pleasant 32-year-old female,  2, now para 2, 0-0-2, who was admitted at 39 weeks 1 day gestation for scheduled repeat  section.  She had pregnancy care at [facility] complicated by maternal obesity, type 1 gestational diabetes managed with diet and history of  section in her previous pregnancy.  She also has history of anxiety and depression for which she had previously been taking Celexa and Wellbutrin.  She discontinued both of these medications with a positive pregnancy test.  The patient has hypothyroidism managed with Levothyroxine 75 mcg throughout pregnancy.      HOSPITAL COURSE:  The patient was admitted and underwent an uncomplicated repeat low-transverse  section.  Estimated blood loss for the procedure was 600 mL.  Postoperatively, the patient did well.  She ambulated, tolerated a regular diet, had adequate pain control and was deemed stable for discharge.  She had mild acute blood loss anemia and was started on oral iron.  Hemoglobin was 9.0 after surgery.  On postoperative day #2 the patient was discharged home without  complications.         CHACORTA LANDEROS MD             D: 2018   T: 2018   MT: LEE      Name:     DIMITRI ALLEN   MRN:      [id number]        Account:        [id number]   :      1985           Admit Date:     2018                                  Discharge Date: 2018      Document: [id number]

## 2018-04-23 ENCOUNTER — OFFICE VISIT (OUTPATIENT)
Dept: PEDIATRICS | Facility: CLINIC | Age: 33
End: 2018-04-23
Payer: COMMERCIAL

## 2018-04-23 VITALS
HEIGHT: 64 IN | BODY MASS INDEX: 41.88 KG/M2 | OXYGEN SATURATION: 99 % | SYSTOLIC BLOOD PRESSURE: 100 MMHG | TEMPERATURE: 98.9 F | DIASTOLIC BLOOD PRESSURE: 64 MMHG | WEIGHT: 245.3 LBS | HEART RATE: 87 BPM

## 2018-04-23 DIAGNOSIS — F41.9 ANXIETY: ICD-10-CM

## 2018-04-23 DIAGNOSIS — E03.9 HYPOTHYROIDISM, UNSPECIFIED TYPE: Primary | ICD-10-CM

## 2018-04-23 DIAGNOSIS — F32.1 MODERATE MAJOR DEPRESSION (H): ICD-10-CM

## 2018-04-23 PROCEDURE — 84443 ASSAY THYROID STIM HORMONE: CPT | Performed by: INTERNAL MEDICINE

## 2018-04-23 PROCEDURE — 36415 COLL VENOUS BLD VENIPUNCTURE: CPT | Performed by: INTERNAL MEDICINE

## 2018-04-23 PROCEDURE — 84439 ASSAY OF FREE THYROXINE: CPT | Performed by: INTERNAL MEDICINE

## 2018-04-23 PROCEDURE — 99204 OFFICE O/P NEW MOD 45 MIN: CPT | Performed by: INTERNAL MEDICINE

## 2018-04-23 RX ORDER — ESCITALOPRAM OXALATE 10 MG/1
10 TABLET ORAL DAILY
Qty: 30 TABLET | Refills: 1 | Status: SHIPPED | OUTPATIENT
Start: 2018-04-23 | End: 2018-06-04

## 2018-04-23 ASSESSMENT — ANXIETY QUESTIONNAIRES
6. BECOMING EASILY ANNOYED OR IRRITABLE: MORE THAN HALF THE DAYS
2. NOT BEING ABLE TO STOP OR CONTROL WORRYING: MORE THAN HALF THE DAYS
5. BEING SO RESTLESS THAT IT IS HARD TO SIT STILL: NOT AT ALL
GAD7 TOTAL SCORE: 10
1. FEELING NERVOUS, ANXIOUS, OR ON EDGE: MORE THAN HALF THE DAYS
7. FEELING AFRAID AS IF SOMETHING AWFUL MIGHT HAPPEN: NOT AT ALL
3. WORRYING TOO MUCH ABOUT DIFFERENT THINGS: MORE THAN HALF THE DAYS
IF YOU CHECKED OFF ANY PROBLEMS ON THIS QUESTIONNAIRE, HOW DIFFICULT HAVE THESE PROBLEMS MADE IT FOR YOU TO DO YOUR WORK, TAKE CARE OF THINGS AT HOME, OR GET ALONG WITH OTHER PEOPLE: NOT DIFFICULT AT ALL

## 2018-04-23 ASSESSMENT — PATIENT HEALTH QUESTIONNAIRE - PHQ9: 5. POOR APPETITE OR OVEREATING: MORE THAN HALF THE DAYS

## 2018-04-23 NOTE — PATIENT INSTRUCTIONS
1. Check thyroid function today  2. Will refill levothyroxine once labs are back  3. Start lexapro (escitalopram) 10 mg once a day  - if have side effects, but in half and take 5 mg once a day for a few days and go back up  4. Follow-up in 1 month

## 2018-04-23 NOTE — LETTER
My Depression Action Plan  Name: Eloina Aj   Date of Birth 1985  Date: 4/23/2018    My doctor: Tasneem Jones   My clinic: [address]  [address]  [facility] [address] [phone]          GREEN    ZONE   Good Control    What it looks like:     Things are going generally well. You have normal up s and down s. You may even feel depressed from time to time, but bad moods usually last less than a day.   What you need to do:  1. Continue to care for yourself (see self care plan)  2. Check your depression survival kit and update it as needed  3. Follow your physician s recommendations including any medication.  4. Do not stop taking medication unless you consult with your physician first.           YELLOW         ZONE Getting Worse    What it looks like:     Depression is starting to interfere with your life.     It may be hard to get out of bed; you may be starting to isolate yourself from others.    Symptoms of depression are starting to last most all day and this has happened for several days.     You may have suicidal thoughts but they are not constant.   What you need to do:     1. Call your care team, your response to treatment will improve if you keep your care team informed of your progress. Yellow periods are signs an adjustment may need to be made.     2. Continue your self-care, even if you have to fake it!    3. Talk to someone in your support network    4. Open up your depression survival kit           RED    ZONE Medical Alert - Get Help    What it looks like:     Depression is seriously interfering with your life.     You may experience these or other symptoms: You can t get out of bed most days, can t work or engage in other necessary activities, you have trouble taking care of basic hygiene, or basic responsibilities, thoughts of suicide or death that will not go away, self-injurious behavior.     What you need to do:  1. Call your  Attempted to call pt today about her being added on to Dr. Wilkins schedule for today but left her a message letting her know Dr. Wilkins was waiting to see her today for an office visit. Also, wanted to know why did she leave the clinic today. Please call Dr. Wilkins back at [phone].   care team and request a same-day appointment. If they are not available (weekends or after hours) call your local crisis line, emergency room or 911.            Depression Self Care Plan / Survival Kit    Self-Care for Depression  Here s the deal. Your body and mind are really not as separate as most people think.  What you do and think affects how you feel and how you feel influences what you do and think. This means if you do things that people who feel good do, it will help you feel better.  Sometimes this is all it takes.  There is also a place for medication and therapy depending on how severe your depression is, so be sure to consult with your medical provider and/ or Behavioral Health Consultant if your symptoms are worsening or not improving.     In order to better manage my stress, I will:    Exercise  Get some form of exercise, every day. This will help reduce pain and release endorphins, the  feel good  chemicals in your brain. This is almost as good as taking antidepressants!  This is not the same as joining a gym and then never going! (they count on that by the way ) It can be as simple as just going for a walk or doing some gardening, anything that will get you moving.      Hygiene   Maintain good hygiene (Get out of bed in the morning, Make your bed, Brush your teeth, Take a shower, and Get dressed like you were going to work, even if you are unemployed).  If your clothes don't fit try to get ones that do.    Diet  I will strive to eat foods that are good for me, drink plenty of water, and avoid excessive sugar, caffeine, alcohol, and other mood-altering substances.  Some foods that are helpful in depression are: complex carbohydrates, B vitamins, flaxseed, fish or fish oil, fresh fruits and vegetables.    Psychotherapy  I agree to participate in Individual Therapy (if recommended).    Medication  If prescribed medications, I agree to take them.  Missing doses can result in serious side effects.  I  understand that drinking alcohol, or other illicit drug use, may cause potential side effects.  I will not stop my medication abruptly without first discussing it with my provider.    Staying Connected With Others  I will stay in touch with my friends, family members, and my primary care provider/team.    Use your imagination  Be creative.  We all have a creative side; it doesn t matter if it s oil painting, sand castles, or mud pies! This will also kick up the endorphins.    Witness Beauty  (AKA stop and smell the roses) Take a look outside, even in mid-winter. Notice colors, textures. Watch the squirrels and birds.     Service to others  Be of service to others.  There is always someone else in need.  By helping others we can  get out of ourselves  and remember the really important things.  This also provides opportunities for practicing all the other parts of the program.    Humor  Laugh and be silly!  Adjust your TV habits for less news and crime-drama and more comedy.    Control your stress  Try breathing deep, massage therapy, biofeedback, and meditation. Find time to relax each day.     My support system    Clinic Contact:  Phone number:    Contact 1:  Phone number:    Contact 2:  Phone number:    Shinto/:  Phone number:    Therapist:  Phone number:    Local crisis center:    Phone number:    Other community support:  Phone number:

## 2018-04-23 NOTE — MR AVS SNAPSHOT
"              After Visit Summary   4/23/2018    Eloina Aj    MRN: [id number]           Patient Information     Date Of Birth          1985        Visit Information        Provider Department      4/23/2018 11:00 AM Poonam Calderon MD [facility]        Today's Diagnoses     Hypothyroidism, unspecified type    -  1    Moderate major depression (H)        Anxiety          Care Instructions    1. Check thyroid function today  2. Will refill levothyroxine once labs are back  3. Start lexapro (escitalopram) 10 mg once a day  - if have side effects, but in half and take 5 mg once a day for a few days and go back up  4. Follow-up in 1 month          Follow-ups after your visit        Follow-up notes from your care team     Return in about 4 weeks (around 5/21/2018).      Who to contact     If you have questions or need follow up information about today's clinic visit or your schedule please contact [facility] directly at [phone].  Normal or non-critical lab and imaging results will be communicated to you by Almond[employer], letter or phone within 4 business days after the clinic has received the results. If you do not hear from us within 7 days, please contact the clinic through [employer] or phone. If you have a critical or abnormal lab result, we will notify you by phone as soon as possible.  Submit refill requests through [employer] or call your pharmacy and they will forward the refill request to us. Please allow 3 business days for your refill to be completed.          Additional Information About Your Visit        Almondyhart Information     [employer] lets you send messages to your doctor, view your test results, renew your prescriptions, schedule appointments and more. To sign up, go to [email or web address] . Click on \"Log in\" on the left side of the screen, which will take you to the Welcome page. Then click on \"Sign up Now\" on the right side of the page.     You will be asked to " "enter the access code listed below, as well as some personal information. Please follow the directions to create your username and password.     Your access code is: [email or web address]  Expires: 2018 10:57 AM     Your access code will  in 90 days. If you need help or a new code, please call your [facility]3] or [phone].        Care EveryWhere ID     This is your Care EveryWhere ID. This could be used by other organizations to access your [facility] medical records  [id number]        Your Vitals Were     Pulse Temperature Height Pulse Oximetry BMI (Body Mass Index)       87 98.9  F (37.2  C) (Oral) 5' 4\" (1.626 m) 99% 42.11 kg/m2        Blood Pressure from Last 3 Encounters:   18 100/64   18 105/57   06 122/64    Weight from Last 3 Encounters:   18 245 lb 4.8 oz (111.3 kg)   18 270 lb (122.5 kg)   06 163 lb (73.9 kg)              We Performed the Following     T4, free     TSH          Today's Medication Changes          These changes are accurate as of 18 11:57 AM.  If you have any questions, ask your nurse or doctor.               Start taking these medicines.        Dose/Directions    escitalopram 10 MG tablet   Commonly known as:  LEXAPRO   Used for:  Moderate major depression (H), Anxiety   Started by:  Poonam Calderon MD        Dose:  10 mg   Take 1 tablet (10 mg) by mouth daily   Quantity:  30 tablet   Refills:  1            Where to get your medicines      These medications were sent to [facility] [address] [facility] [address] AT [facility] [place] [address]  [address] [facility] [address]     Phone:  [phone]     escitalopram 10 MG tablet                Primary Care Provider Office Phone # Fax #    Tasneem CLARK Jones [phone]       [facility] [address] [facility] [address]        Equal Access to Services     BAKARI CARSON AH: yanet Oakes, " dae parryalluis camachoremi prettyin hayaan adeeg kharash la'aan ah. So [facility] [phone].    ATENCIÓN: Si ramiro dallas, tiene a marrufo disposición servicios gratuitos de asistencia lingüística. Jorje al [phone].    We comply with applicable federal civil rights laws and [place] laws. We do not discriminate on the basis of race, color, national origin, age, disability, sex, sexual orientation, or gender identity.            Thank you!     Thank you for choosing [facility]  for your care. Our goal is always to provide you with excellent care. Hearing back from our patients is one way we can continue to improve our services. Please take a few minutes to complete the written survey that you may receive in the mail after your visit with us. Thank you!             Your Updated Medication List - Protect others around you: Learn how to safely use, store and throw away your medicines at www.disposemymeds.org.          This list is accurate as of 18 11:57 AM.  Always use your most recent med list.                   Brand Name Dispense Instructions for use Diagnosis    escitalopram 10 MG tablet    LEXAPRO    30 tablet    Take 1 tablet (10 mg) by mouth daily    Moderate major depression (H), Anxiety       LEVOTHYROXINE SODIUM PO      Take 75 mcg by mouth daily        oxyCODONE IR 5 MG tablet    ROXICODONE    14 tablet    Take 1 tablet (5 mg) by mouth every 6 hours as needed for severe pain    S/P repeat low transverse        prenatal multivitamin plus iron 27-0.8 MG Tabs per tablet      Take 1 tablet by mouth daily

## 2018-04-23 NOTE — PROGRESS NOTES
SUBJECTIVE:   Eloina Aj is a 32 year old female who presents to clinic today for the following health issues:    Medication Followup of Levothyroxine (patient needing refill)    Taking Medication as prescribed: yes    Side Effects:  None    Medication Helping Symptoms:  Yes    Has been on levothyroxine for last 1-1.5 years. Had labs last at [facility] in Feb. Did not need to make any adjustments in dosage during pregnancy. Had baby 3 week ago. Patient endorses fatigue, but more likely due to lack of sleep with .     Abnormal Mood Symptoms    Duration: 15 years    Description:  Depression: YES- worse after having baby  Anxiety: no   Panic attacks: no      Accompanying signs and symptoms: see PHQ-9 and SHARITA scores    History (similar episodes/previous evaluation): Had seen Dontae in  for depression [place]     Precipitating or alleviating factors: None, fluctuates randomly    Therapies tried and outcome: Celexa (Citalopram) (was on this for 8 years in past stopped working for depression), Effexor XR (Venafaxine) (Helped good in the past) - wasn't very compliant with the medication, Prozac (Fluoxetine) and Zoloft (sertraline)- in high school. Didn't feel like worked too well, Wellbutrin (Bupropion) (was on for 6 months didn't help very well thought)     Had depression/anxiety before pregnancy. Stopped medications before pregnancy. Was on citalopram and bupropion before pregnancy. Was on citalopram for most of 8 years. Tapered up and then down and back up again when found out  was deployed. Went on bupropion about 6 months prior to getting pregnant this time. Not sure if it helped a lot. Symptoms present during pregnancy as well. Not getting worse, but not getting better. Had post-partum depression with her first baby. Doesn't feel like that right now. Has worked with a therapist in the [place] not sure it was a good fit. Does not  "feel she has time for this right now. Breast feeding did not go well, but pumping 1-2 times a day.    Reviewed and updated as needed this visit by clinical staff  Tobacco  Allergies  Meds  Problems  Med Hx  Surg Hx  Fam Hx  Soc Hx        Reviewed and updated as needed this visit by Provider  Allergies  Meds  Problems       -------------------------------------    ROS:  Constitutional, HEENT, cardiovascular, pulmonary, GI, , musculoskeletal, neuro, skin, endocrine and psych systems are negative, except as otherwise noted.    OBJECTIVE:                                                    /64 (BP Location: Right arm, Patient Position: Chair, Cuff Size: Adult Large)  Pulse 87  Temp 98.9  F (37.2  C) (Oral)  Ht 5' 4\" (1.626 m)  Wt 245 lb 4.8 oz (111.3 kg)  SpO2 99%  BMI 42.11 kg/m2   Body mass index is 42.11 kg/(m^2).  General Appearance: tired appearing, healthy, alert and no distress  Eyes:   no discharge, erythema.  Normal pupils.  Neck: Supple.  No adenopathy, no asymmetry, masses, or scars and thyroid normal to palpation  Respiratory: lungs clear to auscultation - no rales, rhonchi or wheezes.  Cardiovascular: regular rate and rhythm, normal S1 S2, no S3 or S4 and no murmur, click or rub.  No peripheral edema.  Skin: no rashes or lesions.  Well perfused and normal turgor.  Psychiatric: affect flat and anxious    Diagnostic Test Results:  Results for orders placed or performed in visit on 04/23/18   TSH   Result Value Ref Range    TSH 1.53 0.40 - 4.00 mU/L   T4, free   Result Value Ref Range    T4 Free 1.07 0.76 - 1.46 ng/dL        ASSESSMENT/PLAN:                                                      (E03.9) Hypothyroidism, unspecified type  (primary encounter diagnosis)  Comment: well controlled  Plan: TSH, T4, free, levothyroxine         (SYNTHROID/LEVOTHROID) 75 MCG tablet, TSH, T4,        free  - continue levothyroxine 75 mcg once a day  - recheck in 6 months given newly " "post-partum    (F32.1) Moderate major depression (H)  (F41.9) Anxiety  Comment: not well controlled off of medication. Felt like citalopram was losing effectiveness  Plan: escitalopram (LEXAPRO) 10 MG tablet  - will try changing to lexapro 10 mg once a day - discussed possible side effects  - wellbutrin not recommended with breast feeding - will hold off on this for now    BMI:   Estimated body mass index is 42.11 kg/(m^2) as calculated from the following:    Height as of this encounter: 5' 4\" (1.626 m).    Weight as of this encounter: 245 lb 4.8 oz (111.3 kg).   Weight management plan: post-partum      Follow up with Provider - 1 month     [facility]          "

## 2018-04-24 PROBLEM — F41.9 ANXIETY: Status: ACTIVE | Noted: 2018-04-24

## 2018-04-24 LAB
T4 FREE SERPL-MCNC: 1.07 NG/DL (ref 0.76–1.46)
TSH SERPL DL<=0.005 MIU/L-ACNC: 1.53 MU/L (ref 0.4–4)

## 2018-04-24 RX ORDER — LEVOTHYROXINE SODIUM 75 UG/1
75 TABLET ORAL DAILY
Qty: 90 TABLET | Refills: 1 | Status: SHIPPED | OUTPATIENT
Start: 2018-04-24 | End: 2018-06-04

## 2018-04-24 RX ORDER — LEVOTHYROXINE SODIUM 175 UG/1
175 TABLET ORAL DAILY
Qty: 90 TABLET | Refills: 1 | Status: SHIPPED | OUTPATIENT
Start: 2018-04-24 | End: 2018-04-24

## 2018-04-24 ASSESSMENT — ANXIETY QUESTIONNAIRES: GAD7 TOTAL SCORE: 10

## 2018-04-24 ASSESSMENT — PATIENT HEALTH QUESTIONNAIRE - PHQ9: SUM OF ALL RESPONSES TO PHQ QUESTIONS 1-9: 12

## 2018-05-03 PROBLEM — E03.9 HYPOTHYROIDISM, UNSPECIFIED TYPE: Status: ACTIVE | Noted: 2018-04-23

## 2018-05-03 PROBLEM — E03.9 HYPOTHYROIDISM, UNSPECIFIED TYPE: Status: ACTIVE | Noted: 2018-05-03

## 2018-06-01 NOTE — PROGRESS NOTES
SUBJECTIVE:   Eloina Aj is a 33 year old female who presents to clinic today for the following health issues:    Depression and Anxiety Follow-Up    Status since last visit: Improved     Other associated symptoms:None    Complicating factors:     Significant life event: No     Current substance abuse: None    Patient with history of anxiety/depression. Previously on citalopram and wellbutrin before pregnancy. Had stopped them both during pregnancy and felt was doing ok. Also with post-partum depression with the first. Family felt like symptoms were worsening and came in for visit 4/23. Prescribed Lexapro, but did not start until May 4th because thought maybe she could get by without it. First 2 weeks, had some dry mouth and was really thirsty. Has improved. Symptoms of depression much better. Baby slept through the night a few times last week. This also helps.    PHQ-9 4/23/2018 6/4/2018   Total Score 12 1   Q9: Suicide Ideation Not at all Not at all     SHARITA-7 SCORE 4/23/2018 6/4/2018   Total Score 10 4       PHQ-9  English  PHQ-9   Any Language  SHARITA-7  Suicide Assessment Five-step Evaluation and Treatment (SAFE-T)    Eczema - breaking out on hands and up arm. Uses aloe lotion. Helps, but has not gone away. Also with history of psoriasis. Has triamcinolone cream for this, but has not tried it on the eczema.       Amount of exercise or physical activity: 2-3 days/week for an average of 30-45 minutes    Problems taking medications regularly: No    Medication side effects: none    Diet: regular (no restrictions)    Reviewed and updated as needed this visit by clinical staff  Tobacco  Allergies  Meds  Problems  Med Hx  Surg Hx  Fam Hx  Soc Hx        Reviewed and updated as needed this visit by Provider  Allergies  Meds  Problems       -------------------------------------    ROS:  Constitutional, HEENT, cardiovascular, pulmonary, gi and gu systems are negative, except as otherwise noted.    OBJECTIVE:  "                                                   BP 96/54 (BP Location: Right arm, Patient Position: Sitting, Cuff Size: Adult Large)  Pulse 65  Temp 97.8  F (36.6  C) (Tympanic)  Ht 5' 4\" (1.626 m)  Wt 252 lb (114.3 kg)  SpO2 99%  BMI 43.26 kg/m2   Body mass index is 43.26 kg/(m^2).  General Appearance: healthy, alert and no distress  Eyes:   no discharge, erythema.  Normal pupils.  Skin: erythematous scaly spots on right thumb and forearm.  Well perfused and normal turgor.  Psychiatric: mentation appears normal and affect normal/bright.    Diagnostic Test Results:  none      ASSESSMENT/PLAN:                                                      (F33.1) Major depressive disorder, recurrent episode, moderate (H)  (primary encounter diagnosis)  (F41.9) Anxiety  Comment: much improved  Plan: escitalopram (LEXAPRO) 10 MG tablet  - continue lexapro 10 mg daily  - discussed continuing for minimum of 9-12 months  - can f/u over phone with PHQ9 in 6 months if still doing well, f/u with me in 12 months    (L30.9) Eczema, unspecified type  Comment: mild, suspect triggered by heat  Plan:   - triamcinolone cream (already has for psoriasis) 1-2 times a day as needed  - continue moisturizer    (E03.9) Hypothyroidism, unspecified type  Comment: controlled  Plan: levothyroxine (SYNTHROID/LEVOTHROID) 75 MCG         tablet  - continue levothyroxine 75 mcg a day  - recheck TSH/free T4 in October as lab only visit given post-partum status      Follow up with Provider - 1 year     [facility]AN MAR[facility]          "

## 2018-06-04 ENCOUNTER — OFFICE VISIT (OUTPATIENT)
Dept: PEDIATRICS | Facility: CLINIC | Age: 33
End: 2018-06-04
Payer: COMMERCIAL

## 2018-06-04 VITALS
SYSTOLIC BLOOD PRESSURE: 96 MMHG | WEIGHT: 252 LBS | OXYGEN SATURATION: 99 % | TEMPERATURE: 97.8 F | DIASTOLIC BLOOD PRESSURE: 54 MMHG | BODY MASS INDEX: 43.02 KG/M2 | HEIGHT: 64 IN | HEART RATE: 65 BPM

## 2018-06-04 DIAGNOSIS — F33.1 MAJOR DEPRESSIVE DISORDER, RECURRENT EPISODE, MODERATE (H): Primary | ICD-10-CM

## 2018-06-04 DIAGNOSIS — F41.9 ANXIETY: ICD-10-CM

## 2018-06-04 DIAGNOSIS — L30.9 ECZEMA, UNSPECIFIED TYPE: ICD-10-CM

## 2018-06-04 DIAGNOSIS — E03.9 HYPOTHYROIDISM, UNSPECIFIED TYPE: ICD-10-CM

## 2018-06-04 PROCEDURE — 99214 OFFICE O/P EST MOD 30 MIN: CPT | Performed by: INTERNAL MEDICINE

## 2018-06-04 RX ORDER — LEVOTHYROXINE SODIUM 75 UG/1
75 TABLET ORAL DAILY
Qty: 90 TABLET | Refills: 1 | Status: SHIPPED | OUTPATIENT
Start: 2018-06-04 | End: 2018-10-25

## 2018-06-04 RX ORDER — ESCITALOPRAM OXALATE 10 MG/1
10 TABLET ORAL DAILY
Qty: 90 TABLET | Refills: 1 | Status: SHIPPED | OUTPATIENT
Start: 2018-06-04 | End: 2018-10-25

## 2018-06-04 ASSESSMENT — ANXIETY QUESTIONNAIRES
1. FEELING NERVOUS, ANXIOUS, OR ON EDGE: SEVERAL DAYS
6. BECOMING EASILY ANNOYED OR IRRITABLE: SEVERAL DAYS
5. BEING SO RESTLESS THAT IT IS HARD TO SIT STILL: NOT AT ALL
GAD7 TOTAL SCORE: 4
IF YOU CHECKED OFF ANY PROBLEMS ON THIS QUESTIONNAIRE, HOW DIFFICULT HAVE THESE PROBLEMS MADE IT FOR YOU TO DO YOUR WORK, TAKE CARE OF THINGS AT HOME, OR GET ALONG WITH OTHER PEOPLE: NOT DIFFICULT AT ALL
3. WORRYING TOO MUCH ABOUT DIFFERENT THINGS: SEVERAL DAYS
2. NOT BEING ABLE TO STOP OR CONTROL WORRYING: NOT AT ALL
7. FEELING AFRAID AS IF SOMETHING AWFUL MIGHT HAPPEN: NOT AT ALL

## 2018-06-04 ASSESSMENT — PATIENT HEALTH QUESTIONNAIRE - PHQ9: 5. POOR APPETITE OR OVEREATING: SEVERAL DAYS

## 2018-06-04 NOTE — MR AVS SNAPSHOT
After Visit Summary   6/4/2018    Eloina Aj    MRN: [id number]           Patient Information     Date Of Birth          1985        Visit Information        Provider Department      6/4/2018 9:40 AM Poonam Calderon MD [facility]        Today's Diagnoses     Major depressive disorder, recurrent episode, moderate (H)    -  1    Anxiety        Eczema, unspecified type        Hypothyroidism, unspecified type          Care Instructions    1. Refilled lexapro for another 6 months  - if doing well in 6 months, can completed questionnaires over the phone and will send another 6 months  2. Recheck thyroid in October as lab only appointment  3. Use triamcinolone cream for eczema and continues aloe cream as well          Follow-ups after your visit        Follow-up notes from your care team     Return in about 1 year (around 6/4/2019).      Who to contact     If you have questions or need follow up information about today's clinic visit or your schedule please contact [facility] directly at [phone].  Normal or non-critical lab and imaging results will be communicated to you by Anavexhart, letter or phone within 4 business days after the clinic has received the results. If you do not hear from us within 7 days, please contact the clinic through Lucky Pait or phone. If you have a critical or abnormal lab result, we will notify you by phone as soon as possible.  Submit refill requests through Technitrol or call your pharmacy and they will forward the refill request to us. Please allow 3 business days for your refill to be completed.          Additional Information About Your Visit        MyChart Information     Technitrol gives you secure access to your electronic health record. If you see a primary care provider, you can also send messages to your care team and make appointments. If you have questions, please call your primary care clinic.  If you do not have a primary care  "provider, please call [phone] and they will assist you.        Care EveryWhere ID     This is your Care EveryWhere ID. This could be used by other organizations to access your [place] medical records  [id number]        Your Vitals Were     Pulse Temperature Height Pulse Oximetry BMI (Body Mass Index)       65 97.8  F (36.6  C) (Tympanic) 5' 4\" (1.626 m) 99% 43.26 kg/m2        Blood Pressure from Last 3 Encounters:   06/04/18 96/54   04/23/18 100/64   03/31/18 105/57    Weight from Last 3 Encounters:   06/04/18 252 lb (114.3 kg)   04/23/18 245 lb 4.8 oz (111.3 kg)   03/26/18 270 lb (122.5 kg)              Today, you had the following     No orders found for display         Where to get your medicines      These medications were sent to [facility] [address] [place] [address] [address]  AT [facility] [address]  [address] [facility] [address]     Phone:  [phone]     escitalopram 10 MG tablet    levothyroxine 75 MCG tablet          Primary Care Provider Office Phone # Fax #    Poonam Calderon [phone] [address] [place] [address]        Equal Access to Services     WILLA CARSON AH: Hadii aad ku hadasho Soomaali, waaxda luqadaha, qaybta kaalmada adilia, mary dickens. So [facility] [phone].    ATENCIÓN: Si habla español, tiene a marrufo disposición servicios gratuitos de asistencia lingüística. Llerica al [phone].    We comply with applicable federal civil rights laws and [place] laws. We do not discriminate on the basis of race, color, national origin, age, disability, sex, sexual orientation, or gender identity.            Thank you!     Thank you for choosing [facility] JEAN MARIE  for your care. Our goal is always to provide you with excellent care. Hearing back from our patients is one way we can continue to improve our services. Please take a few minutes to complete the written survey that you may " receive in the mail after your visit with us. Thank you!             Your Updated Medication List - Protect others around you: Learn how to safely use, store and throw away your medicines at www.disposemymeds.org.          This list is accurate as of 6/4/18  9:55 AM.  Always use your most recent med list.                   Brand Name Dispense Instructions for use Diagnosis    escitalopram 10 MG tablet    LEXAPRO    90 tablet    Take 1 tablet (10 mg) by mouth daily    Anxiety, Major depressive disorder, recurrent episode, moderate (H)       levothyroxine 75 MCG tablet    SYNTHROID/LEVOTHROID    90 tablet    Take 1 tablet (75 mcg) by mouth daily    Hypothyroidism, unspecified type       prenatal multivitamin plus iron 27-0.8 MG Tabs per tablet      Take 1 tablet by mouth daily

## 2018-06-04 NOTE — PATIENT INSTRUCTIONS
1. Refilled lexapro for another 6 months  - if doing well in 6 months, can completed questionnaires over the phone and will send another 6 months  2. Recheck thyroid in October as lab only appointment  3. Use triamcinolone cream for eczema and continues aloe cream as well

## 2018-06-05 ASSESSMENT — PATIENT HEALTH QUESTIONNAIRE - PHQ9: SUM OF ALL RESPONSES TO PHQ QUESTIONS 1-9: 1

## 2018-06-05 ASSESSMENT — ANXIETY QUESTIONNAIRES: GAD7 TOTAL SCORE: 4

## 2018-08-30 ENCOUNTER — MYC MEDICAL ADVICE (OUTPATIENT)
Dept: PEDIATRICS | Facility: CLINIC | Age: 33
End: 2018-08-30

## 2018-08-30 DIAGNOSIS — N91.2 ABSENCE OF MENSTRUATION: Primary | ICD-10-CM

## 2018-08-31 ENCOUNTER — TELEPHONE (OUTPATIENT)
Dept: PEDIATRICS | Facility: CLINIC | Age: 33
End: 2018-08-31

## 2018-08-31 ENCOUNTER — HOSPITAL ENCOUNTER (OUTPATIENT)
Dept: LAB | Facility: CLINIC | Age: 33
Discharge: HOME | End: 2018-08-31
Attending: INTERNAL MEDICINE | Admitting: INTERNAL MEDICINE
Payer: COMMERCIAL

## 2018-08-31 DIAGNOSIS — N91.2 ABSENCE OF MENSTRUATION: ICD-10-CM

## 2018-08-31 LAB — B-HCG SERPL-ACNC: <1 IU/L (ref 0–5)

## 2018-08-31 PROCEDURE — 36415 COLL VENOUS BLD VENIPUNCTURE: CPT | Performed by: INTERNAL MEDICINE

## 2018-08-31 PROCEDURE — 84702 CHORIONIC GONADOTROPIN TEST: CPT | Performed by: INTERNAL MEDICINE

## 2018-08-31 NOTE — TELEPHONE ENCOUNTER
Patient requesting confirmation pregnancy test.     Per [employer] message, had 2 faint lines on home test. LMP 7/23/2018    Currently 5 months post partum.    Please advise. HCG quant Td'up. Please review, edit/add, and sign if appropriate.    Tata BENITEZ RN, BSN, PHN  [place] [facility] RN

## 2018-08-31 NOTE — TELEPHONE ENCOUNTER
Clinic Action Needed:  Yes, please call pt at [phone] OR send her a [email or web address] message.     Presenting Problem: Pt requesting lab orders for hcg blood pregnancy test.  She is 5 months post partum and takes Lexapro, had a faint line with the home UPT.  She would like to have the lab appt today if possible.     Routed to: SARBJIT Peter RN/SAM

## 2018-08-31 NOTE — TELEPHONE ENCOUNTER
Patient informed. She will go to  [facility] walk in lab.     As she wanted to know the sooner the better.     Tata BENITEZ RN, BSN, PHN  [place] [facility] RN

## 2018-09-11 ENCOUNTER — MYC MEDICAL ADVICE (OUTPATIENT)
Dept: PEDIATRICS | Facility: CLINIC | Age: 33
End: 2018-09-11

## 2018-09-11 DIAGNOSIS — N91.2 ABSENCE OF MENSTRUATION: Primary | ICD-10-CM

## 2018-09-11 NOTE — TELEPHONE ENCOUNTER
Quantitative HCG ordered on 08/31  New order pended for Quantitative.  If you prefer at Qualitative, please change order.  GOLDEN Montes RN

## 2018-09-12 ENCOUNTER — HOSPITAL ENCOUNTER (OUTPATIENT)
Dept: LAB | Facility: CLINIC | Age: 33
Discharge: HOME | End: 2018-09-12
Attending: INTERNAL MEDICINE | Admitting: INTERNAL MEDICINE
Payer: COMMERCIAL

## 2018-09-12 DIAGNOSIS — N91.2 ABSENCE OF MENSTRUATION: ICD-10-CM

## 2018-09-12 LAB — B-HCG SERPL-ACNC: 1 IU/L (ref 0–5)

## 2018-09-12 PROCEDURE — 84702 CHORIONIC GONADOTROPIN TEST: CPT | Performed by: INTERNAL MEDICINE

## 2018-10-24 ENCOUNTER — HOSPITAL ENCOUNTER (OUTPATIENT)
Dept: LAB | Facility: CLINIC | Age: 33
Discharge: HOME | End: 2018-10-24
Attending: INTERNAL MEDICINE | Admitting: INTERNAL MEDICINE
Payer: COMMERCIAL

## 2018-10-24 DIAGNOSIS — E03.9 HYPOTHYROIDISM, UNSPECIFIED TYPE: ICD-10-CM

## 2018-10-24 LAB
T4 FREE SERPL-MCNC: 1 NG/DL (ref 0.76–1.46)
TSH SERPL DL<=0.005 MIU/L-ACNC: 3.14 MU/L (ref 0.4–4)

## 2018-10-24 PROCEDURE — 36415 COLL VENOUS BLD VENIPUNCTURE: CPT | Performed by: INTERNAL MEDICINE

## 2018-10-24 PROCEDURE — 84443 ASSAY THYROID STIM HORMONE: CPT | Performed by: INTERNAL MEDICINE

## 2018-10-24 PROCEDURE — 84439 ASSAY OF FREE THYROXINE: CPT | Performed by: INTERNAL MEDICINE

## 2018-10-25 ENCOUNTER — OFFICE VISIT (OUTPATIENT)
Dept: PEDIATRICS | Facility: CLINIC | Age: 33
End: 2018-10-25
Payer: COMMERCIAL

## 2018-10-25 VITALS
WEIGHT: 269.2 LBS | DIASTOLIC BLOOD PRESSURE: 66 MMHG | BODY MASS INDEX: 45.96 KG/M2 | OXYGEN SATURATION: 97 % | TEMPERATURE: 97.5 F | HEIGHT: 64 IN | HEART RATE: 66 BPM | SYSTOLIC BLOOD PRESSURE: 94 MMHG

## 2018-10-25 DIAGNOSIS — F33.1 MAJOR DEPRESSIVE DISORDER, RECURRENT EPISODE, MODERATE (H): Primary | ICD-10-CM

## 2018-10-25 DIAGNOSIS — E03.9 HYPOTHYROIDISM, UNSPECIFIED TYPE: ICD-10-CM

## 2018-10-25 DIAGNOSIS — F41.9 ANXIETY: ICD-10-CM

## 2018-10-25 DIAGNOSIS — E66.01 MORBID OBESITY (H): ICD-10-CM

## 2018-10-25 DIAGNOSIS — Z23 NEED FOR PROPHYLACTIC VACCINATION AND INOCULATION AGAINST INFLUENZA: ICD-10-CM

## 2018-10-25 PROBLEM — Z98.891 S/P REPEAT LOW TRANSVERSE C-SECTION: Status: RESOLVED | Noted: 2018-03-29 | Resolved: 2018-10-25

## 2018-10-25 PROCEDURE — 99214 OFFICE O/P EST MOD 30 MIN: CPT | Mod: 25 | Performed by: INTERNAL MEDICINE

## 2018-10-25 PROCEDURE — 90471 IMMUNIZATION ADMIN: CPT | Performed by: INTERNAL MEDICINE

## 2018-10-25 PROCEDURE — 90686 IIV4 VACC NO PRSV 0.5 ML IM: CPT | Performed by: INTERNAL MEDICINE

## 2018-10-25 RX ORDER — LEVOTHYROXINE SODIUM 75 UG/1
75 TABLET ORAL DAILY
Qty: 90 TABLET | Refills: 3 | Status: SHIPPED | OUTPATIENT
Start: 2018-10-25 | End: 2019-11-03

## 2018-10-25 RX ORDER — NORETHINDRONE ACETATE AND ETHINYL ESTRADIOL AND FERROUS FUMARATE 1MG-20(21)
1 KIT ORAL DAILY
Refills: 3 | COMMUNITY
Start: 2018-05-15 | End: 2023-06-19

## 2018-10-25 RX ORDER — ESCITALOPRAM OXALATE 20 MG/1
20 TABLET ORAL DAILY
Qty: 90 TABLET | Refills: 1 | Status: SHIPPED | OUTPATIENT
Start: 2018-10-25 | End: 2018-12-06

## 2018-10-25 ASSESSMENT — ANXIETY QUESTIONNAIRES
5. BEING SO RESTLESS THAT IT IS HARD TO SIT STILL: NOT AT ALL
2. NOT BEING ABLE TO STOP OR CONTROL WORRYING: NOT AT ALL
3. WORRYING TOO MUCH ABOUT DIFFERENT THINGS: NOT AT ALL
7. FEELING AFRAID AS IF SOMETHING AWFUL MIGHT HAPPEN: NOT AT ALL
1. FEELING NERVOUS, ANXIOUS, OR ON EDGE: SEVERAL DAYS
6. BECOMING EASILY ANNOYED OR IRRITABLE: SEVERAL DAYS
GAD7 TOTAL SCORE: 3
IF YOU CHECKED OFF ANY PROBLEMS ON THIS QUESTIONNAIRE, HOW DIFFICULT HAVE THESE PROBLEMS MADE IT FOR YOU TO DO YOUR WORK, TAKE CARE OF THINGS AT HOME, OR GET ALONG WITH OTHER PEOPLE: NOT DIFFICULT AT ALL

## 2018-10-25 ASSESSMENT — PATIENT HEALTH QUESTIONNAIRE - PHQ9
SUM OF ALL RESPONSES TO PHQ QUESTIONS 1-9: 6
5. POOR APPETITE OR OVEREATING: SEVERAL DAYS

## 2018-10-25 NOTE — MR AVS SNAPSHOT
After Visit Summary   10/25/2018    Eloina Aj    MRN: [id number]           Patient Information     Date Of Birth          1985        Visit Information        Provider Department      10/25/2018 9:40 AM Poonam Calderon MD [facility]        Today's Diagnoses     Anxiety        Major depressive disorder, recurrent episode, moderate (H)        Hypothyroidism, unspecified type          Care Instructions    1. Increase lexapro to 20 mg - can take 2 of what you have left and then 1 with the new prescription  2. Thyroid ok. Will put refills on file  3. Follow-up on 12/6 for physical, fasting labs and depression recheck  - fast 10-12 hours, water and black coffee/tea are ok  4. Flu shot today    24 week Weight Healthy Lifestyle Plan:  Located in [facility] or [place]  $499 for 24-week program  Call: [phone]  [email or web address]            Follow-ups after your visit        Follow-up notes from your care team     Return in about 4 weeks (around 11/22/2018).      Your next 10 appointments already scheduled     Dec 06, 2018  9:40 AM CST   PHYSICAL with Poonam Calderon MD   [facility] ([facility])    [address] [place] [address]  [address]  [facility] [id number] [phone]              Who to contact     If you have questions or need follow up information about today's clinic visit or your schedule please contact [facility] directly at [phone].  Normal or non-critical lab and imaging results will be communicated to you by MyChart, letter or phone within 4 business days after the clinic has received the results. If you do not hear from us within 7 days, please contact the clinic through MyChart or phone. If you have a critical or abnormal lab result, we will notify you by phone as soon as possible.  Submit refill requests through Forseva or call your pharmacy and they will forward the refill request to us. Please  "allow 3 business days for your refill to be completed.          Additional Information About Your Visit        Padcomhart Information     [employer] gives you secure access to your electronic health record. If you see a primary care provider, you can also send messages to your care team and make appointments. If you have questions, please call your primary care clinic.  If you do not have a primary care provider, please call [phone] and they will assist you.        Care EveryWhere ID     This is your Care EveryWhere ID. This could be used by other organizations to access your [place] medical records  [id number]        Your Vitals Were     Pulse Temperature Height Pulse Oximetry BMI (Body Mass Index)       66 97.5  F (36.4  C) (Tympanic) 5' 4\" (1.626 m) 97% 46.21 kg/m2        Blood Pressure from Last 3 Encounters:   10/25/18 94/66   06/04/18 96/54   04/23/18 100/64    Weight from Last 3 Encounters:   10/25/18 269 lb 3.2 oz (122.1 kg)   06/04/18 252 lb (114.3 kg)   04/23/18 245 lb 4.8 oz (111.3 kg)              Today, you had the following     No orders found for display         Today's Medication Changes          These changes are accurate as of 10/25/18 10:26 AM.  If you have any questions, ask your nurse or doctor.               These medicines have changed or have updated prescriptions.        Dose/Directions    escitalopram 20 MG tablet   Commonly known as:  LEXAPRO   This may have changed:    - medication strength  - how much to take   Used for:  Anxiety, Major depressive disorder, recurrent episode, moderate (H)   Changed by:  Poonam Calderon MD        Dose:  20 mg   Take 1 tablet (20 mg) by mouth daily   Quantity:  90 tablet   Refills:  1            Where to get your medicines      These medications were sent to [facility] [address] [facility] [address] [facility] AT [address] [facility], [facility] [address]     Phone:  [phone]     escitalopram 20 MG tablet    " levothyroxine 75 MCG tablet                Primary Care Provider Office Phone # Fax #    Poonam Calderon [phone] [address] [place] [address]        Equal Access to Services     BAKARI CARSON : Trang reynolds jeniffero Sosascha, waaxda luqadaha, qaybta kaalmada adilia, mary alvares connierhonda hartman laBettemouna dickens. So [facility] [phone].    ATENCIÓN: Si habla español, tiene a marrufo disposición servicios gratuitos de asistencia lingüística. [facility] [phone].    We comply with applicable federal civil rights laws and [place] laws. We do not discriminate on the basis of race, color, national origin, age, disability, sex, sexual orientation, or gender identity.            Thank you!     Thank you for choosing [facility]  for your care. Our goal is always to provide you with excellent care. Hearing back from our patients is one way we can continue to improve our services. Please take a few minutes to complete the written survey that you may receive in the mail after your visit with us. Thank you!             Your Updated Medication List - Protect others around you: Learn how to safely use, store and throw away your medicines at www.disposemymeds.org.          This list is accurate as of 10/25/18 10:26 AM.  Always use your most recent med list.                   Brand Name Dispense Instructions for use Diagnosis    escitalopram 20 MG tablet    LEXAPRO    90 tablet    Take 1 tablet (20 mg) by mouth daily    Anxiety, Major depressive disorder, recurrent episode, moderate (H)       JUNEL FE 1/20 1-20 MG-MCG per tablet   Generic drug:  norethindrone-ethinyl estradiol      Take 1 tablet by mouth daily        levothyroxine 75 MCG tablet    SYNTHROID/LEVOTHROID    90 tablet    Take 1 tablet (75 mcg) by mouth daily    Hypothyroidism, unspecified type       metFORMIN 500 MG tablet    GLUCOPHAGE     Take 1,000 mg by mouth daily (with breakfast)        prenatal multivitamin  plus iron 27-0.8 MG Tabs per tablet      Take 1 tablet by mouth daily

## 2018-10-25 NOTE — PROGRESS NOTES

## 2018-10-25 NOTE — PROGRESS NOTES
SUBJECTIVE:   Eloina Aj is a 33 year old female who presents to clinic today for the following health issues:    Depression and Anxiety Follow-Up    Status since last visit: No change    Other associated symptoms:fatigue, loss of energy, weight gain    Complicating factors:     Significant life event: No     Current substance abuse: None    Has been more tired and exhausted over last couple of months. More irritable. Getting enough sleep. Having a hard time getting out of bed. Needing an alarm clock to get up. Feels anxiety has been ok.    Has gained weight. Didn't have a period for about 3 months. Getting acne/hair growth. Had a positive pregnancy test, but blood tests were negative. Saw OB and started on metformin 500 mg twice a day the end of Sept. Started birth control the next week. Had period the 2nd week of starting the birth control. Has cut out a lot of sugar. Eats breakfast, but does not eat lunch most days. Eats dinner. Increased fluid intake. Doesn't drink pop. Has been increasing exercise - got an exercise machine. Using it twice a day for about 20 minutes.     PHQ 4/23/2018 6/4/2018 10/25/2018   PHQ-9 Total Score 12 1 6   Q9: Suicide Ideation Not at all Not at all Not at all     SHARITA-7 SCORE 4/23/2018 6/4/2018 10/25/2018   Total Score 10 4 3       PHQ-9  English  PHQ-9   Any Language  SHARITA-7  Suicide Assessment Five-step Evaluation and Treatment (SAFE-T)    Hypothyroidism Follow-up      Since last visit, patient describes the following symptoms: Weight stable, no hair loss, no skin changes, no constipation, no loose stools, weight gain of 17 lbs and dry skin      Amount of exercise or physical activity: 2-3 days/week for an average of 15-30 minutes    Problems taking medications regularly: No    Medication side effects: none    Diet: regular (no restrictions)    Reviewed and updated as needed this visit by clinical staff  Tobacco  Allergies  Meds  Problems  Med Hx  Surg Hx  Fam Hx  Soc Hx  "       Reviewed and updated as needed this visit by Provider  Allergies  Meds  Problems       -------------------------------------    ROS:  Constitutional, HEENT, cardiovascular, pulmonary, gi and gu systems are negative, except as otherwise noted.    OBJECTIVE:                                                    BP 94/66 (BP Location: Right arm, Patient Position: Sitting, Cuff Size: Adult Large)  Pulse 66  Temp 97.5  F (36.4  C) (Tympanic)  Ht 5' 4\" (1.626 m)  Wt 269 lb 3.2 oz (122.1 kg)  SpO2 97%  BMI 46.21 kg/m2   Body mass index is 46.21 kg/(m^2).  General Appearance: healthy, alert and no distress  Eyes:   no discharge, erythema.  Normal pupils.  Cardiovascular: regular rate and rhythm, normal S1 S2, no S3 or S4 and no murmur, click or rub.  No peripheral edema.  Skin: no rashes or lesions.  Well perfused and normal turgor.  Psychiatric: affect flat    Diagnostic Test Results:  Results for orders placed or performed during the hospital encounter of 10/24/18   TSH   Result Value Ref Range    TSH 3.14 0.40 - 4.00 mU/L   T4, free   Result Value Ref Range    T4 Free 1.00 0.76 - 1.46 ng/dL        ASSESSMENT/PLAN:                                                      (F33.1) Major depressive disorder, recurrent episode, moderate (H)  (primary encounter diagnosis)  (F41.9) Anxiety  Comment: increased fatigue and sleeping more may be symptoms of uncontrolled depression. Also more irritable.  Plan: escitalopram (LEXAPRO) 20 MG tablet  - increase lexapro to 20 mg daily  - could consider adding back in wellbutrin if not improving    (E03.9) Hypothyroidism, unspecified type  Comment:controlled. Not cause of weight gain  Plan: levothyroxine (SYNTHROID/LEVOTHROID) 75 MCG         tablet  - continue levothyroxine 75 mcg daily    (E66.01) Morbid obesity (H)  Comment: weight increasing fairly rapidly. Could be due to decreased activity with sleeping more. Recently started exercising and started metformin. Diet relatively " "healthy  Plan:   - agree with metformin and exercise plan  - information given for 24 week weight loss program through [facility]  - could also consider referral to weight management clinic if weight not decreasing with good changes she recently made    (Z23) Need for prophylactic vaccination and inoculation against influenza  Plan: FLU VACCINE, SPLIT VIRUS, IM (QUADRIVALENT)         [41034]- >3 YRS, Vaccine Administration,         Initial [74994]         BMI:   Estimated body mass index is 46.21 kg/(m^2) as calculated from the following:    Height as of this encounter: 5' 4\" (1.626 m).    Weight as of this encounter: 269 lb 3.2 oz (122.1 kg).   Weight management plan: Discussed healthy diet and exercise guidelines and patient will follow up in 3 months in clinic to re-evaluate.      FUTURE APPOINTMENTS:       - Follow-up visit on 12/6 for physical, fasting labs and recheck depression    [facility] JEAN MARIE          "

## 2018-10-25 NOTE — PATIENT INSTRUCTIONS
1. Increase lexapro to 20 mg - can take 2 of what you have left and then 1 with the new prescription  2. Thyroid ok. Will put refills on file  3. Follow-up on 12/6 for physical, fasting labs and depression recheck  - fast 10-12 hours, water and black coffee/tea are ok  4. Flu shot today    24 week Weight Healthy Lifestyle Plan:  Located in [facility] or [place]  $499 for 24-week program  Call: [phone]  [email or web address]

## 2018-10-26 ASSESSMENT — ANXIETY QUESTIONNAIRES: GAD7 TOTAL SCORE: 3

## 2018-12-03 ASSESSMENT — ENCOUNTER SYMPTOMS
BREAST MASS: 0
FREQUENCY: 0
ARTHRALGIAS: 0
HEMATOCHEZIA: 0
PALPITATIONS: 0
SORE THROAT: 0
DYSURIA: 0
JOINT SWELLING: 0
NAUSEA: 0
HEARTBURN: 0
DIZZINESS: 0
HEMATURIA: 0
CHILLS: 0
DIARRHEA: 0
NERVOUS/ANXIOUS: 0
MYALGIAS: 0
SHORTNESS OF BREATH: 0
WEAKNESS: 0
CONSTIPATION: 0
FEVER: 0
COUGH: 0
EYE PAIN: 0
HEADACHES: 0
PARESTHESIAS: 0
ABDOMINAL PAIN: 0

## 2018-12-06 ENCOUNTER — OFFICE VISIT (OUTPATIENT)
Dept: PEDIATRICS | Facility: CLINIC | Age: 33
End: 2018-12-06
Payer: COMMERCIAL

## 2018-12-06 VITALS
HEIGHT: 64 IN | DIASTOLIC BLOOD PRESSURE: 66 MMHG | SYSTOLIC BLOOD PRESSURE: 108 MMHG | OXYGEN SATURATION: 98 % | WEIGHT: 272.5 LBS | BODY MASS INDEX: 46.52 KG/M2 | HEART RATE: 65 BPM | TEMPERATURE: 97.8 F

## 2018-12-06 DIAGNOSIS — E66.01 MORBID OBESITY (H): ICD-10-CM

## 2018-12-06 DIAGNOSIS — F41.9 ANXIETY: ICD-10-CM

## 2018-12-06 DIAGNOSIS — Z13.220 SCREENING CHOLESTEROL LEVEL: ICD-10-CM

## 2018-12-06 DIAGNOSIS — E03.9 HYPOTHYROIDISM, UNSPECIFIED TYPE: ICD-10-CM

## 2018-12-06 DIAGNOSIS — Z00.00 ROUTINE HISTORY AND PHYSICAL EXAMINATION OF ADULT: Primary | ICD-10-CM

## 2018-12-06 DIAGNOSIS — Z13.1 SCREENING FOR DIABETES MELLITUS: ICD-10-CM

## 2018-12-06 DIAGNOSIS — E28.2 PCOS (POLYCYSTIC OVARIAN SYNDROME): ICD-10-CM

## 2018-12-06 DIAGNOSIS — F33.1 MAJOR DEPRESSIVE DISORDER, RECURRENT EPISODE, MODERATE (H): ICD-10-CM

## 2018-12-06 LAB
CHOLEST SERPL-MCNC: 206 MG/DL
GLUCOSE SERPL-MCNC: 99 MG/DL (ref 70–99)
HDLC SERPL-MCNC: 70 MG/DL
LDLC SERPL CALC-MCNC: 110 MG/DL
NONHDLC SERPL-MCNC: 136 MG/DL
TRIGL SERPL-MCNC: 130 MG/DL

## 2018-12-06 PROCEDURE — 99395 PREV VISIT EST AGE 18-39: CPT | Performed by: INTERNAL MEDICINE

## 2018-12-06 PROCEDURE — 80061 LIPID PANEL: CPT | Performed by: INTERNAL MEDICINE

## 2018-12-06 PROCEDURE — 36415 COLL VENOUS BLD VENIPUNCTURE: CPT | Performed by: INTERNAL MEDICINE

## 2018-12-06 PROCEDURE — 82947 ASSAY GLUCOSE BLOOD QUANT: CPT | Performed by: INTERNAL MEDICINE

## 2018-12-06 PROCEDURE — 99213 OFFICE O/P EST LOW 20 MIN: CPT | Mod: 25 | Performed by: INTERNAL MEDICINE

## 2018-12-06 RX ORDER — ESCITALOPRAM OXALATE 20 MG/1
20 TABLET ORAL DAILY
Qty: 30 TABLET | Refills: 6 | Status: SHIPPED | OUTPATIENT
Start: 2018-12-06 | End: 2019-08-14

## 2018-12-06 ASSESSMENT — ENCOUNTER SYMPTOMS
SHORTNESS OF BREATH: 0
NAUSEA: 0
ARTHRALGIAS: 0
DYSURIA: 0
ABDOMINAL PAIN: 0
DIZZINESS: 0
JOINT SWELLING: 0
SORE THROAT: 0
CHILLS: 0
HEMATOCHEZIA: 0
FATIGUE: 1
WEAKNESS: 0
MYALGIAS: 0
NERVOUS/ANXIOUS: 0
EYE PAIN: 0
HEARTBURN: 0
CONSTIPATION: 0
FREQUENCY: 0
PALPITATIONS: 0
PARESTHESIAS: 0
HEADACHES: 0
FEVER: 0
HEMATURIA: 0
DIARRHEA: 0
BREAST MASS: 0
COUGH: 0

## 2018-12-06 ASSESSMENT — ANXIETY QUESTIONNAIRES
7. FEELING AFRAID AS IF SOMETHING AWFUL MIGHT HAPPEN: NOT AT ALL
3. WORRYING TOO MUCH ABOUT DIFFERENT THINGS: SEVERAL DAYS
6. BECOMING EASILY ANNOYED OR IRRITABLE: SEVERAL DAYS
5. BEING SO RESTLESS THAT IT IS HARD TO SIT STILL: NOT AT ALL
GAD7 TOTAL SCORE: 5
1. FEELING NERVOUS, ANXIOUS, OR ON EDGE: SEVERAL DAYS
IF YOU CHECKED OFF ANY PROBLEMS ON THIS QUESTIONNAIRE, HOW DIFFICULT HAVE THESE PROBLEMS MADE IT FOR YOU TO DO YOUR WORK, TAKE CARE OF THINGS AT HOME, OR GET ALONG WITH OTHER PEOPLE: NOT DIFFICULT AT ALL
2. NOT BEING ABLE TO STOP OR CONTROL WORRYING: SEVERAL DAYS

## 2018-12-06 ASSESSMENT — PATIENT HEALTH QUESTIONNAIRE - PHQ9
SUM OF ALL RESPONSES TO PHQ QUESTIONS 1-9: 5
5. POOR APPETITE OR OVEREATING: SEVERAL DAYS

## 2018-12-06 NOTE — MR AVS SNAPSHOT
After Visit Summary   12/6/2018    Eloina Aj    MRN: [id number]           Patient Information     Date Of Birth          1985        Visit Information        Provider Department      12/6/2018 9:40 AM Poonam Calderon MD [facility]        Today's Diagnoses     Routine history and physical examination of adult    -  1    PCOS (polycystic ovarian syndrome)        Screening for diabetes mellitus        Screening cholesterol level        Anxiety        Major depressive disorder, recurrent episode, moderate (H)          Care Instructions      Preventive Health Recommendations  Female Ages 26 - 39  Yearly exam:   See your health care provider every year in order to    Review health changes.     Discuss preventive care.      Review your medicines if you your doctor has prescribed any.    Until age 30: Get a Pap test every three years (more often if you have had an abnormal result).    After age 30: Talk to your doctor about whether you should have a Pap test every 3 years or have a Pap test with HPV screening every 5 years.   You do not need a Pap test if your uterus was removed (hysterectomy) and you have not had cancer.  You should be tested each year for STDs (sexually transmitted diseases), if you're at risk.   Talk to your provider about how often to have your cholesterol checked.  If you are at risk for diabetes, you should have a diabetes test (fasting glucose).  Shots: Get a flu shot each year. Get a tetanus shot every 10 years.   Nutrition:     Eat at least 5 servings of fruits and vegetables each day.    Eat whole-grain bread, whole-wheat pasta and brown rice instead of white grains and rice.    Get adequate Calcium and Vitamin D.     Lifestyle    Exercise at least 150 minutes a week (30 minutes a day, 5 days of the week). This will help you control your weight and prevent disease.    Limit alcohol to one drink per day.    No smoking.     Wear sunscreen to prevent skin  "cancer.    See your dentist every six months for an exam and cleaning.  ---------------  1. Labs today: cholesterol, blood sugar  2. Increase metformin to 1000 mg twice a day  3. Put refills of lexapro on file  4. If have big changes in weight (more than 10 lbs), should recheck thyroid in 6 months as lab visit  5. If lips not improving, recommend try lotrimin (clotrimazole)          Follow-ups after your visit        Follow-up notes from your care team     Return in about 1 year (around 12/6/2019).      Who to contact     If you have questions or need follow up information about today's clinic visit or your schedule please contact [facility] directly at [phone].  Normal or non-critical lab and imaging results will be communicated to you by AREVShart, letter or phone within 4 business days after the clinic has received the results. If you do not hear from us within 7 days, please contact the clinic through AlterGt or phone. If you have a critical or abnormal lab result, we will notify you by phone as soon as possible.  Submit refill requests through Dexterra or call your pharmacy and they will forward the refill request to us. Please allow 3 business days for your refill to be completed.          Additional Information About Your Visit        Dexterra Information     Dexterra gives you secure access to your electronic health record. If you see a primary care provider, you can also send messages to your care team and make appointments. If you have questions, please call your primary care clinic.  If you do not have a primary care provider, please call [phone] and they will assist you.        Care EveryWhere ID     This is your Care EveryWhere ID. This could be used by other organizations to access your [facility] medical records  [id number]        Your Vitals Were     Pulse Temperature Height Last Period Pulse Oximetry BMI (Body Mass Index)    65 97.8  F (36.6  C) (Tympanic) 5' 4\" (1.626 m) 11/28/2018 " (Exact Date) 98% 46.77 kg/m2       Blood Pressure from Last 3 Encounters:   12/06/18 108/66   10/25/18 94/66   06/04/18 96/54    Weight from Last 3 Encounters:   12/06/18 272 lb 8 oz (123.6 kg)   10/25/18 269 lb 3.2 oz (122.1 kg)   06/04/18 252 lb (114.3 kg)              We Performed the Following     Glucose     Lipid panel reflex to direct LDL Fasting          Today's Medication Changes          These changes are accurate as of 12/6/18 10:05 AM.  If you have any questions, ask your nurse or doctor.               These medicines have changed or have updated prescriptions.        Dose/Directions    metFORMIN 500 MG tablet   Commonly known as:  GLUCOPHAGE   This may have changed:  when to take this   Used for:  PCOS (polycystic ovarian syndrome)   Changed by:  Poonam Calderon MD        Dose:  1000 mg   Take 2 tablets (1,000 mg) by mouth 2 times daily (with meals)   Quantity:  120 tablet   Refills:  11            Where to get your medicines      These medications were sent to [facility] [address] [place] [address] [address]  [facility] [id number]  [address] [facility] [address]     Phone:  [phone]     escitalopram 20 MG tablet    metFORMIN 500 MG tablet                Primary Care Provider Office Phone # Fax #    Poonam Calderon [phone] [address] [place] [address]        Equal Access to Services     [facility] ALLI AH: Hadii jayson Chatman, waaxda luqadaha, qaybta kaalmada nadegeyasusy, mary dickens. So [facility] [phone].    ATENCIÓN: Si habla español, tiene a marrufo disposición servicios gratuitos de asistencia lingüística. Llame al [phone].    We comply with applicable federal civil rights laws and [place] laws. We do not discriminate on the basis of race, color, national origin, age, disability, sex, sexual orientation, or gender identity.            Thank you!     Thank you for choosing  [facility]  for your care. Our goal is always to provide you with excellent care. Hearing back from our patients is one way we can continue to improve our services. Please take a few minutes to complete the written survey that you may receive in the mail after your visit with us. Thank you!             Your Updated Medication List - Protect others around you: Learn how to safely use, store and throw away your medicines at www.disposemymeds.org.          This list is accurate as of 12/6/18 10:05 AM.  Always use your most recent med list.                   Brand Name Dispense Instructions for use Diagnosis    escitalopram 20 MG tablet    LEXAPRO    30 tablet    Take 1 tablet (20 mg) by mouth daily    Anxiety, Major depressive disorder, recurrent episode, moderate (H)       JUNEL FE 1/20 1-20 MG-MCG tablet   Generic drug:  norethindrone-ethinyl estradiol      Take 1 tablet by mouth daily        levothyroxine 75 MCG tablet    SYNTHROID/LEVOTHROID    90 tablet    Take 1 tablet (75 mcg) by mouth daily    Hypothyroidism, unspecified type       metFORMIN 500 MG tablet    GLUCOPHAGE    120 tablet    Take 2 tablets (1,000 mg) by mouth 2 times daily (with meals)    PCOS (polycystic ovarian syndrome)

## 2018-12-06 NOTE — PROGRESS NOTES
SUBJECTIVE:   CC: Eloina Aj is an 33 year old woman who presents for preventive health visit.     Physical   Annual:     Getting at least 3 servings of Calcium per day:  Yes    Bi-annual eye exam:  Yes    Dental care twice a year:  Yes    Sleep apnea or symptoms of sleep apnea:  None    Diet:  Regular (no restrictions)    Frequency of exercise:  1 day/week    Duration of exercise:  15-30 minutes    Taking medications regularly:  No    Barriers to taking medications:  Problems remembering to take them    Additional concerns today:  No    PHQ-2 Total Score: 2    Depression and Anxiety Follow-Up    Status since last visit: Improved mood     Other associated symptoms:fatigue but may be due to baby not sleeping    Complicating factors:     Significant life event: No     Current substance abuse: None    Mood better. Has been more fatigued, but baby not sleeping. Feels rested once sleeping.    PCOS: OB/GYN would like PCP to take over Metformin prescription. Has trouble remembering to take metformin. Forgets 2-3 times a week. Taking for PCOS. OB wondering if should increase to 1000 mg twice a day. Was previously on this higher dose.  No side effects. Also taking OCPs.     Snores:  notices snores and has gotten worse. Not stopping breathing. Has not been able to lose weight as was hoping.    PHQ 6/4/2018 10/25/2018 12/6/2018   PHQ-9 Total Score 1 6 5   Q9: Suicide Ideation Not at all Not at all Not at all     SHARITA-7 SCORE 6/4/2018 10/25/2018 12/6/2018   Total Score 4 3 5       PHQ-9  English  PHQ-9   Any Language  SHARITA-7  Suicide Assessment Five-step Evaluation and Treatment (SAFE-T)    Hypothyroidism Follow-up      Since last visit, patient describes the following symptoms: Weight stable, no hair loss, no skin changes, no constipation, no loose stools    Today's PHQ-2 Score:   PHQ-2 ( 1999 Pfizer) 12/3/2018   Q1: Little interest or pleasure in doing things 1   Q2: Feeling down, depressed or hopeless 1   PHQ-2  Score 2   Q1: Little interest or pleasure in doing things Several days   Q2: Feeling down, depressed or hopeless Several days   PHQ-2 Score 2     Abuse: Current or Past(Physical, Sexual or Emotional)- No  Do you feel safe in your environment? Yes    Social History   Substance Use Topics     Smoking status: Never Smoker     Smokeless tobacco: Never Used     Alcohol use No     Alcohol Use 12/3/2018   If you drink alcohol do you typically have greater than 3 drinks per day OR greater than 7 drinks per week? No   No flowsheet data found.    Reviewed orders with patient.  Reviewed health maintenance and updated orders accordingly - Yes  Patient Active Problem List   Diagnosis     Major depressive disorder, recurrent episode, moderate (H)     Calculus of gallbladder with acute cholecystitis     Anxiety     Hypothyroidism, unspecified type     Obesity (H)     Past Surgical History:   Procedure Laterality Date     APPENDECTOMY        SECTION        SECTION N/A 3/29/2018    Procedure:  SECTION;  Repeat  Section;  Surgeon: Fern Nicolas MD;  Location: [facility] L[facility]     EYE SURGERY Bilateral     muscle surgery for wandering eyes     ORTHOPEDIC SURGERY Right     major reconstruction     TYMPANOPLASTY Left        Social History   Substance Use Topics     Smoking status: Never Smoker     Smokeless tobacco: Never Used     Alcohol use No     Family History   Problem Relation Age of Onset     Arthritis Mother      b. 47,     Cancer Mother      bladder Ca     Diabetes Father      b. 1947. Type II     Cardiovascular Father      MI. ASCD     Depression Father            Mammogram not appropriate for this patient based on age.    Pertinent mammograms are reviewed under the imaging tab.  History of abnormal Pap smear: NO - age 30- 65 PAP every 3 years recommended     Reviewed and updated as needed this visit by clinical staff  Tobacco  Allergies  Meds  Problems  Med Hx  Surg Hx  Fam Hx   "Soc Hx        Reviewed and updated as needed this visit by Provider  Allergies  Meds  Problems          Review of Systems   Constitutional: Positive for fatigue. Negative for chills and fever.        Weight gain   HENT: Negative for congestion, ear pain, hearing loss and sore throat.         Snoring   Eyes: Negative for pain and visual disturbance.   Respiratory: Negative for cough and shortness of breath.    Cardiovascular: Negative for chest pain, palpitations and peripheral edema.   Gastrointestinal: Negative for abdominal pain, constipation, diarrhea, heartburn, hematochezia and nausea.   Breasts:  Negative for tenderness, breast mass and discharge.   Genitourinary: Negative for dysuria, frequency, genital sores, hematuria, pelvic pain, urgency, vaginal bleeding and vaginal discharge.   Musculoskeletal: Negative for arthralgias, joint swelling and myalgias.   Skin: Negative for rash.   Neurological: Negative for dizziness, weakness, headaches and paresthesias.   Psychiatric/Behavioral: Negative for mood changes. The patient is not nervous/anxious.    All other systems reviewed and are negative.     OBJECTIVE:   /66 (BP Location: Right arm, Patient Position: Sitting, Cuff Size: Adult Large)  Pulse 65  Temp 97.8  F (36.6  C) (Tympanic)  Ht 5' 4\" (1.626 m)  Wt 272 lb 8 oz (123.6 kg)  LMP 11/28/2018 (Exact Date)  SpO2 98%  BMI 46.77 kg/m2  Physical Exam  GENERAL: Obese, alert and no distress  EYES: Eyes grossly normal to inspection, PERRL and conjunctivae and sclerae normal  HENT: ear canals and TM's normal, nose and mouth without ulcers or lesions  NECK: no adenopathy, no asymmetry, masses, or scars and thyroid normal to palpation  RESP: lungs clear to auscultation - no rales, rhonchi or wheezes  BREAST: deferred to GYN  CV: regular rate and rhythm, normal S1 S2, no S3 or S4, no murmur, click or rub, no peripheral edema and peripheral pulses strong  ABDOMEN: obese, soft, nontender, no " hepatosplenomegaly, no masses and bowel sounds normal  MS: no gross musculoskeletal defects noted, no edema  SKIN: no suspicious lesions or rashes  NEURO: Normal strength and tone, mentation intact and speech normal  PSYCH: mentation appears normal, affect normal/bright    Diagnostic Test Results:  Results for orders placed or performed in visit on 12/06/18   Lipid panel reflex to direct LDL Fasting   Result Value Ref Range    Cholesterol 206 (H) <200 mg/dL    Triglycerides 130 <150 mg/dL    HDL Cholesterol 70 >49 mg/dL    LDL Cholesterol Calculated 110 (H) <100 mg/dL    Non HDL Cholesterol 136 (H) <130 mg/dL   Glucose   Result Value Ref Range    Glucose 99 70 - 99 mg/dL       ASSESSMENT/PLAN:   1. Routine history and physical examination of adult  Pap, tdap, flu UTD  Discussed weight loss.    2. PCOS (polycystic ovarian syndrome)  Increase metformin to 1000 mg twice a day. Agree with getting pill box to help remember taking. Continue OCPs.  - metFORMIN (GLUCOPHAGE) 500 MG tablet; Take 2 tablets (1,000 mg) by mouth 2 times daily (with meals)  Dispense: 120 tablet; Refill: 11    3. Major depressive disorder, recurrent episode, moderate (H)  Controlled. Fatigue likely due to lack of sleep. Continue lexapro.  - escitalopram (LEXAPRO) 20 MG tablet; Take 1 tablet (20 mg) by mouth daily  Dispense: 30 tablet; Refill: 6    4. Anxiety  Controlled. Fatigue likely due to lack of sleep. Continue lexapro.  - escitalopram (LEXAPRO) 20 MG tablet; Take 1 tablet (20 mg) by mouth daily  Dispense: 30 tablet; Refill: 6    5. Obesity  Discussed diet, exercise and weight loss    6. Hypothyroidism  Recent labs normal. Continue levothyroxine. Will plan to recheck thyroid in 6 months if loses >10 lbs. If not able to lose weight, can wait a year.    7. Screening for diabetes mellitus  - Glucose    8. Screening cholesterol level  - Lipid panel reflex to direct LDL Fasting    COUNSELING:  Reviewed preventive health counseling, as reflected in  "patient instructions  Special attention given to:        Regular exercise       Healthy diet/nutrition       Contraception    BP Readings from Last 1 Encounters:   12/06/18 108/66     Estimated body mass index is 46.77 kg/(m^2) as calculated from the following:    Height as of this encounter: 5' 4\" (1.626 m).    Weight as of this encounter: 272 lb 8 oz (123.6 kg).      Weight management plan: Discussed healthy diet and exercise guidelines     reports that she has never smoked. She has never used smokeless tobacco.      Counseling Resources:  ATP IV Guidelines  Pooled Cohorts Equation Calculator  Breast Cancer Risk Calculator  FRAX Risk Assessment  ICSI Preventive Guidelines  Dietary Guidelines for Americans, 2010  USDA's MyPlate  ASA Prophylaxis  Lung CA Screening    Poonam Calderon MD  [facility]  "

## 2018-12-06 NOTE — PATIENT INSTRUCTIONS
Preventive Health Recommendations  Female Ages 26 - 39  Yearly exam:   See your health care provider every year in order to    Review health changes.     Discuss preventive care.      Review your medicines if you your doctor has prescribed any.    Until age 30: Get a Pap test every three years (more often if you have had an abnormal result).    After age 30: Talk to your doctor about whether you should have a Pap test every 3 years or have a Pap test with HPV screening every 5 years.   You do not need a Pap test if your uterus was removed (hysterectomy) and you have not had cancer.  You should be tested each year for STDs (sexually transmitted diseases), if you're at risk.   Talk to your provider about how often to have your cholesterol checked.  If you are at risk for diabetes, you should have a diabetes test (fasting glucose).  Shots: Get a flu shot each year. Get a tetanus shot every 10 years.   Nutrition:     Eat at least 5 servings of fruits and vegetables each day.    Eat whole-grain bread, whole-wheat pasta and brown rice instead of white grains and rice.    Get adequate Calcium and Vitamin D.     Lifestyle    Exercise at least 150 minutes a week (30 minutes a day, 5 days of the week). This will help you control your weight and prevent disease.    Limit alcohol to one drink per day.    No smoking.     Wear sunscreen to prevent skin cancer.    See your dentist every six months for an exam and cleaning.  ---------------  1. Labs today: cholesterol, blood sugar  2. Increase metformin to 1000 mg twice a day  3. Put refills of lexapro on file  4. If have big changes in weight (more than 10 lbs), should recheck thyroid in 6 months as lab visit  5. If lips not improving, recommend try lotrimin (clotrimazole)

## 2018-12-07 ASSESSMENT — ANXIETY QUESTIONNAIRES: GAD7 TOTAL SCORE: 5

## 2019-02-25 ENCOUNTER — OFFICE VISIT (OUTPATIENT)
Dept: URGENT CARE | Facility: URGENT CARE | Age: 34
End: 2019-02-25
Payer: COMMERCIAL

## 2019-02-25 ENCOUNTER — MYC MEDICAL ADVICE (OUTPATIENT)
Dept: PEDIATRICS | Facility: CLINIC | Age: 34
End: 2019-02-25

## 2019-02-25 ENCOUNTER — ANCILLARY PROCEDURE (OUTPATIENT)
Dept: GENERAL RADIOLOGY | Facility: CLINIC | Age: 34
End: 2019-02-25
Attending: PHYSICIAN ASSISTANT
Payer: COMMERCIAL

## 2019-02-25 VITALS
RESPIRATION RATE: 20 BRPM | SYSTOLIC BLOOD PRESSURE: 110 MMHG | BODY MASS INDEX: 46.69 KG/M2 | OXYGEN SATURATION: 99 % | DIASTOLIC BLOOD PRESSURE: 74 MMHG | HEART RATE: 79 BPM | TEMPERATURE: 97.9 F | WEIGHT: 272 LBS

## 2019-02-25 DIAGNOSIS — R07.9 CHEST PAIN, UNSPECIFIED TYPE: ICD-10-CM

## 2019-02-25 DIAGNOSIS — S20.212A CHEST WALL CONTUSION, LEFT, INITIAL ENCOUNTER: Primary | ICD-10-CM

## 2019-02-25 PROCEDURE — 99214 OFFICE O/P EST MOD 30 MIN: CPT | Performed by: PHYSICIAN ASSISTANT

## 2019-02-25 PROCEDURE — 93000 ELECTROCARDIOGRAM COMPLETE: CPT | Performed by: PHYSICIAN ASSISTANT

## 2019-02-25 PROCEDURE — 71101 X-RAY EXAM UNILAT RIBS/CHEST: CPT | Mod: LT

## 2019-02-25 RX ORDER — NAPROXEN 500 MG/1
500 TABLET ORAL 2 TIMES DAILY WITH MEALS
Qty: 14 TABLET | Refills: 0 | Status: SHIPPED | OUTPATIENT
Start: 2019-02-25 | End: 2019-02-25 | Stop reason: ALTCHOICE

## 2019-02-25 RX ORDER — CYCLOBENZAPRINE HCL 5 MG
5 TABLET ORAL 3 TIMES DAILY PRN
Qty: 18 TABLET | Refills: 0 | Status: SHIPPED | OUTPATIENT
Start: 2019-02-25 | End: 2019-03-15

## 2019-02-25 NOTE — TELEPHONE ENCOUNTER
Pt sent a [employer] message stating that she has been experiencing rib and shoulder symptoms.  See [employer] message.    Mariel Hartman RN

## 2019-02-25 NOTE — PROGRESS NOTES
SUBJECTIVE:   Eloina Aj is a 33 year old female presenting with a chief complaint of   Chief Complaint   Patient presents with     Urgent Care     Shoulder Pain     L shoulder pain and rib X 5 days        She is an established patient of [facility].    MS Injury/Pain    Eloina, a 33-year-old female presented to the urgent care for the evaluation of left-sided lower chest pain and left-sided pain at the tip of the shoulder and clavicle for 5 days.  She mentioned that 5 days ago she had to pull her mother's scooter from her car and then put that back into her car which may have caused it.  The scooter waited about 50-70 pounds.  She also mentioned that sometimes she lets her 7-year-old daughter walk on her back but feels that she might have gotten hurt when she let her daughter walk on her back last time 5 days ago.  She describes the pain to be dull and achy.  The pain is exaggerated during deep breaths and movement, coughing and movement.  She denied any exertional shortness of breath chest pressure, leg swelling, or difficulty breathing.  Tylenol gave her some relief.  The pain is mostly localized to the lower lateral side of her left sided chest.  She denied any URI symptoms or cough.  Denied any numbness tingling or loss of sensation in any parts of her body.  Denied any lightheadedness abdominal pain, or abdominal distention.     Review of Systems 10 point review of systems performed and is negative except as above and in HPI.      Past Medical History:   Diagnosis Date     Anorexia     From age [age].     Chronic depressive personality disorder     hx of anxiety and depression     GDM (gestational diabetes mellitus)      PCOS (polycystic ovarian syndrome)      Thyroid disease      Tympanosclerosis, unspecified as to involvement      Family History   Problem Relation Age of Onset     Arthritis Mother         b. 4/28/47,     Cancer Mother         bladder Ca     Diabetes Father         b. 8/2/1947. Type II      Cardiovascular Father         MI. ASCD     Depression Father      Current Outpatient Medications   Medication Sig Dispense Refill     cyclobenzaprine (FLEXERIL) 5 MG tablet Take 1 tablet (5 mg) by mouth 3 times daily as needed for muscle spasms 18 tablet 0     escitalopram (LEXAPRO) 20 MG tablet Take 1 tablet (20 mg) by mouth daily 30 tablet 6     JUNEL FE 1/20 1-20 MG-MCG per tablet Take 1 tablet by mouth daily  3     levothyroxine (SYNTHROID/LEVOTHROID) 75 MCG tablet Take 1 tablet (75 mcg) by mouth daily 90 tablet 3     metFORMIN (GLUCOPHAGE) 500 MG tablet Take 2 tablets (1,000 mg) by mouth 2 times daily (with meals) 120 tablet 11     Social History     Tobacco Use     Smoking status: Never Smoker     Smokeless tobacco: Never Used   Substance Use Topics     Alcohol use: No       OBJECTIVE  /74 (BP Location: Right arm, Patient Position: Left side, Cuff Size: Adult Regular)   Pulse 79   Temp 97.9  F (36.6  C)   Resp 20   Wt 123.4 kg (272 lb)   SpO2 99%   BMI 46.69 kg/m      Physical Exam   Constitutional: She appears well-developed and well-nourished. No distress.   HENT:   Head: Normocephalic.   Cardiovascular: Normal rate, regular rhythm and normal heart sounds.   Pulmonary/Chest: Effort normal and breath sounds normal. No stridor. No respiratory distress. She has no wheezes.   Musculoskeletal: Normal range of motion.   Left lower lateral aspect of the chest is mildly tender on deep palpation.  No bony crepitation, swelling, redness, ecchymosis, laceration or bruise noted.  No CVA tenderness.  Lateral rotation of the chest and deep breathing exacerbates the pain.   Vitals reviewed.      Labs:  No results found for this or any previous visit (from the past 24 hour(s)).    X-Ray was done, my findings are: X-ray was done to rule out rib fracture.  No fractured rib noted..   EKG was done: Normal  ASSESSMENT:      ICD-10-CM    1. Chest pain, unspecified type R07.9 EKG 12-lead complete w/read - Clinics      XR Ribs & Chest Lt 3v   2. Chest wall contusion, left, initial encounter S20.212A cyclobenzaprine (FLEXERIL) 5 MG tablet     DISCONTINUED: naproxen (NAPROSYN) 500 MG tablet        Medical Decision Making:    Nonspecific chest pain: Injury, rib fracture, myocardial infarction, acute coronary syndrome.  EKG was done to rule out cardiac abnormality.  EKG is normal.  Chest x-ray was done to rule out  Any fracture or pulmonary abnormality.  No signs of pneumonia.  No pleural effusion or pneumothorax.  No rib fracture noted.      PLAN:    Chest wall contusion: Ibuprofen, Flexeril, rest ice.  If pain persists follow-up with primary care provider in 1 week    Followup:    If not improving or if condition worsens, follow up with your Primary Care Provider    Patient Instructions     Patient Education     Chest Contusion    A contusion is a bruise to the skin, muscle, or ribs. It may cause pain, tenderness, and swelling. It may turn the skin purple until it heals. Contusions take a few days to a few weeks to heal.  Home care  Follow these guidelines when caring for yourself at home:    Rest. Don t do any heavy lifting or strenuous activity. Don t do any activity that causes pain.    Put an ice pack on the injured area. Do this for 20 minutes every 1 to 2 hours the first day. You can make an ice pack by wrapping a plastic bag of ice cubes in a thin towel. Continue to use the ice pack 3 to 4 times a day for the next 2 days. Then use the ice pack as needed to ease pain and swelling.    After 1 to 2 days you may put a warm compress on the area. Do this for 10 minutes several times a day. A warm compress is a clean cloth that s damp with warm water.    Hold a pillow to the affected area when you cough. This will help ease pain.    You may use over-the-counter pain medicine such as acetaminophen or ibuprofen to control pain, unless another pain medicine was prescribed. If you have chronic liver or kidney disease, talk with your  healthcare provider before using these medicines. Also talk with your provider if you ve had a stomach ulcer or gastrointestinal bleeding.  Follow-up care  Follow up with your healthcare provider, or as advised.  When to seek medical advice  Call your healthcare provider right away if any of these occur:    New abdominal pain or abdominal pain that gets worse    Fever of 100.4 F (38 C) or higher, or as directed by your healthcare provider  Call 911  Call 911 if any of these occur:     Dizziness, weakness, or fainting    Shortness of breath, trouble breathing, or breathing fast    Chest pain gets worse when you breathe    Severe pain that comes on suddenly or lasts more than an hour  Date Last Reviewed: 5/1/2017 2000-2018 [employer]. [address] [place] [address], [place] [address]. All rights reserved. This information is not intended as a substitute for professional medical care. Always follow your healthcare professional's instructions.

## 2019-02-25 NOTE — PATIENT INSTRUCTIONS
Patient Education     Chest Contusion    A contusion is a bruise to the skin, muscle, or ribs. It may cause pain, tenderness, and swelling. It may turn the skin purple until it heals. Contusions take a few days to a few weeks to heal.  Home care  Follow these guidelines when caring for yourself at home:    Rest. Don t do any heavy lifting or strenuous activity. Don t do any activity that causes pain.    Put an ice pack on the injured area. Do this for 20 minutes every 1 to 2 hours the first day. You can make an ice pack by wrapping a plastic bag of ice cubes in a thin towel. Continue to use the ice pack 3 to 4 times a day for the next 2 days. Then use the ice pack as needed to ease pain and swelling.    After 1 to 2 days you may put a warm compress on the area. Do this for 10 minutes several times a day. A warm compress is a clean cloth that s damp with warm water.    Hold a pillow to the affected area when you cough. This will help ease pain.    You may use over-the-counter pain medicine such as acetaminophen or ibuprofen to control pain, unless another pain medicine was prescribed. If you have chronic liver or kidney disease, talk with your healthcare provider before using these medicines. Also talk with your provider if you ve had a stomach ulcer or gastrointestinal bleeding.  Follow-up care  Follow up with your healthcare provider, or as advised.  When to seek medical advice  Call your healthcare provider right away if any of these occur:    New abdominal pain or abdominal pain that gets worse    Fever of 100.4 F (38 C) or higher, or as directed by your healthcare provider  Call 911  Call 911 if any of these occur:     Dizziness, weakness, or fainting    Shortness of breath, trouble breathing, or breathing fast    Chest pain gets worse when you breathe    Severe pain that comes on suddenly or lasts more than an hour  Date Last Reviewed: 5/1/2017 2000-2018 [employer]. [address] [facility],  [place] [address]. All rights reserved. This information is not intended as a substitute for professional medical care. Always follow your healthcare professional's instructions.

## 2019-03-15 ENCOUNTER — OFFICE VISIT (OUTPATIENT)
Dept: PEDIATRICS | Facility: CLINIC | Age: 34
End: 2019-03-15
Payer: COMMERCIAL

## 2019-03-15 VITALS
SYSTOLIC BLOOD PRESSURE: 106 MMHG | HEART RATE: 75 BPM | BODY MASS INDEX: 44.05 KG/M2 | TEMPERATURE: 98.4 F | OXYGEN SATURATION: 98 % | WEIGHT: 264.4 LBS | HEIGHT: 65 IN | DIASTOLIC BLOOD PRESSURE: 74 MMHG

## 2019-03-15 DIAGNOSIS — R19.7 DIARRHEA, UNSPECIFIED TYPE: Primary | ICD-10-CM

## 2019-03-15 DIAGNOSIS — E28.2 PCOS (POLYCYSTIC OVARIAN SYNDROME): ICD-10-CM

## 2019-03-15 LAB
ALBUMIN SERPL-MCNC: 3.6 G/DL (ref 3.4–5)
ALP SERPL-CCNC: 69 U/L (ref 40–150)
ALT SERPL W P-5'-P-CCNC: 18 U/L (ref 0–50)
ANION GAP SERPL CALCULATED.3IONS-SCNC: 8 MMOL/L (ref 3–14)
AST SERPL W P-5'-P-CCNC: 17 U/L (ref 0–45)
BASOPHILS # BLD AUTO: 0 10E9/L (ref 0–0.2)
BASOPHILS NFR BLD AUTO: 0.5 %
BILIRUB SERPL-MCNC: 0.4 MG/DL (ref 0.2–1.3)
BUN SERPL-MCNC: 15 MG/DL (ref 7–30)
CALCIUM SERPL-MCNC: 9.2 MG/DL (ref 8.5–10.1)
CHLORIDE SERPL-SCNC: 106 MMOL/L (ref 94–109)
CO2 SERPL-SCNC: 27 MMOL/L (ref 20–32)
CREAT SERPL-MCNC: 0.94 MG/DL (ref 0.52–1.04)
DIFFERENTIAL METHOD BLD: NORMAL
EOSINOPHIL # BLD AUTO: 0.1 10E9/L (ref 0–0.7)
EOSINOPHIL NFR BLD AUTO: 2.3 %
ERYTHROCYTE [DISTWIDTH] IN BLOOD BY AUTOMATED COUNT: 13.4 % (ref 10–15)
GFR SERPL CREATININE-BSD FRML MDRD: 78 ML/MIN/{1.73_M2}
GLUCOSE SERPL-MCNC: 97 MG/DL (ref 70–99)
HCT VFR BLD AUTO: 41.1 % (ref 35–47)
HGB BLD-MCNC: 13.3 G/DL (ref 11.7–15.7)
IGA SERPL-MCNC: 33 MG/DL (ref 70–380)
LYMPHOCYTES # BLD AUTO: 1.2 10E9/L (ref 0.8–5.3)
LYMPHOCYTES NFR BLD AUTO: 22.2 %
MCH RBC QN AUTO: 28.4 PG (ref 26.5–33)
MCHC RBC AUTO-ENTMCNC: 32.4 G/DL (ref 31.5–36.5)
MCV RBC AUTO: 88 FL (ref 78–100)
MONOCYTES # BLD AUTO: 0.3 10E9/L (ref 0–1.3)
MONOCYTES NFR BLD AUTO: 6.1 %
NEUTROPHILS # BLD AUTO: 3.8 10E9/L (ref 1.6–8.3)
NEUTROPHILS NFR BLD AUTO: 68.9 %
PLATELET # BLD AUTO: 188 10E9/L (ref 150–450)
POTASSIUM SERPL-SCNC: 4.1 MMOL/L (ref 3.4–5.3)
PROT SERPL-MCNC: 7 G/DL (ref 6.8–8.8)
RBC # BLD AUTO: 4.68 10E12/L (ref 3.8–5.2)
SODIUM SERPL-SCNC: 141 MMOL/L (ref 133–144)
TSH SERPL DL<=0.005 MIU/L-ACNC: 2.55 MU/L (ref 0.4–4)
WBC # BLD AUTO: 5.6 10E9/L (ref 4–11)

## 2019-03-15 PROCEDURE — 84443 ASSAY THYROID STIM HORMONE: CPT | Performed by: INTERNAL MEDICINE

## 2019-03-15 PROCEDURE — 85025 COMPLETE CBC W/AUTO DIFF WBC: CPT | Performed by: INTERNAL MEDICINE

## 2019-03-15 PROCEDURE — 82784 ASSAY IGA/IGD/IGG/IGM EACH: CPT | Performed by: INTERNAL MEDICINE

## 2019-03-15 PROCEDURE — 36415 COLL VENOUS BLD VENIPUNCTURE: CPT | Performed by: INTERNAL MEDICINE

## 2019-03-15 PROCEDURE — 80053 COMPREHEN METABOLIC PANEL: CPT | Performed by: INTERNAL MEDICINE

## 2019-03-15 PROCEDURE — 99214 OFFICE O/P EST MOD 30 MIN: CPT | Performed by: INTERNAL MEDICINE

## 2019-03-15 PROCEDURE — 83516 IMMUNOASSAY NONANTIBODY: CPT | Performed by: INTERNAL MEDICINE

## 2019-03-15 PROCEDURE — 83516 IMMUNOASSAY NONANTIBODY: CPT | Mod: 91 | Performed by: INTERNAL MEDICINE

## 2019-03-15 RX ORDER — SACCHAROMYCES BOULARDII 250 MG
250 CAPSULE ORAL 2 TIMES DAILY
COMMUNITY
End: 2020-04-17

## 2019-03-15 ASSESSMENT — MIFFLIN-ST. JEOR: SCORE: 1887.25

## 2019-03-15 NOTE — PATIENT INSTRUCTIONS
1. Labs today: blood counts, liver function, kidney function, thyroid and celiac disease testing  2. Decrease metformin to 500 mg twice a day  3. If improves with decreasing to 500 mg twice a day, could try switching to long acting form and see if you have less side effects with that formulation  4. Let me know how things are going over the next couple of weeks by bijan

## 2019-03-15 NOTE — PROGRESS NOTES
SUBJECTIVE:   Eloina Aj is a 33 year old female who presents to clinic today for the following health issues:    Abdominal Pain      Duration: 5 months    Description (location/character/radiation): gas, bloating       Associated flank pain: None    Intensity:  moderate, severe    Accompanying signs and symptoms:        Fever/Chills: no        Gas/Bloating: YES- both       Nausea/vomitting: no        Diarrhea: YES       Dysuria or Hematuria: no     History (previous similar pain/trauma/previous testing): none    Precipitating or alleviating factors:       Pain worse with eating/BM/urination: worse after eating or coffee       Pain relieved by BM: YES- after 3 consecutive episodes of diarrhea    Therapies tried and outcome: imodium, probiotics    LMP:  not applicable    Feels gassy, bloated with multiple loose bowel movements. Abdominal pain before has bowel movement. Certain foods make worse - coffee, orange, carrots. Will be ok for a day or two with semi-normal stool. Then will be harder pass stool. Then repeats. No blood in stool. No black. When loose yellowish-orange color. Gas and bloating present off and on. Started a probiotic yesterday. Started around the time started metformin.     PCOS: feels symptoms a little improved with increased metformin dose. Has also helped with some weight loss.    Reviewed and updated as needed this visit by clinical staff  Tobacco  Allergies  Meds  Problems  Med Hx  Surg Hx  Fam Hx  Soc Hx        Reviewed and updated as needed this visit by Provider  Tobacco  Allergies  Meds  Problems  Med Hx  Surg Hx  Fam Hx       -------------------------------------    ROS:  Constitutional, HEENT, cardiovascular, pulmonary, gi and gu systems are negative, except as otherwise noted.    OBJECTIVE:                                                    /74 (BP Location: Right arm, Patient Position: Sitting, Cuff Size: Adult Large)   Pulse 75   Temp 98.4  F (36.9  C)  "(Tympanic)   Ht 1.638 m (5' 4.5\")   Wt 119.9 kg (264 lb 6.4 oz)   SpO2 98%   BMI 44.68 kg/m     Body mass index is 44.68 kg/m .  General Appearance: healthy, alert and no distress  Eyes:   no discharge, erythema.  Normal pupils.  Respiratory: lungs clear to auscultation - no rales, rhonchi or wheezes.  Cardiovascular: regular rate and rhythm, normal S1 S2, no S3 or S4 and no murmur, click or rub.  No peripheral edema.  Abdomen: obese, soft, nontender, no hepatosplenomegaly or masses, and bowel sounds normal  Skin: no rashes or lesions.  Well perfused and normal turgor.    Diagnostic Test Results:  Results for orders placed or performed in visit on 03/15/19 (from the past 24 hour(s))   CBC with platelets differential   Result Value Ref Range    WBC 5.6 4.0 - 11.0 10e9/L    RBC Count 4.68 3.8 - 5.2 10e12/L    Hemoglobin 13.3 11.7 - 15.7 g/dL    Hematocrit 41.1 35.0 - 47.0 %    MCV 88 78 - 100 fl    MCH 28.4 26.5 - 33.0 pg    MCHC 32.4 31.5 - 36.5 g/dL    RDW 13.4 10.0 - 15.0 %    Platelet Count 188 150 - 450 10e9/L    % Neutrophils 68.9 %    % Lymphocytes 22.2 %    % Monocytes 6.1 %    % Eosinophils 2.3 %    % Basophils 0.5 %    Absolute Neutrophil 3.8 1.6 - 8.3 10e9/L    Absolute Lymphocytes 1.2 0.8 - 5.3 10e9/L    Absolute Monocytes 0.3 0.0 - 1.3 10e9/L    Absolute Eosinophils 0.1 0.0 - 0.7 10e9/L    Absolute Basophils 0.0 0.0 - 0.2 10e9/L    Diff Method Automated Method    IgA   Result Value Ref Range    IGA 33 (L) 70 - 380 mg/dL        ASSESSMENT/PLAN:                                                      (R19.7) Diarrhea, unspecified type  (primary encounter diagnosis)  Comment: most likely due to increasing metformin dose with timing, but could also be infectious, due to celiac or microscopic colitis or over-replaced thyroid  Plan: Comprehensive metabolic panel, CBC with         platelets differential, TSH with free T4         reflex, Tissue transglutaminase ada IgA and         IgG, IgA  - CMP, TSH and celiac " screening pending  - will decrease metformin to 500 mg twice a day  - if labs are normal and does not improve with decreasing metformin, would recommend stool tests as next step  - also gave handout for low FODMAT diet; however, majority of foods she listed as causing symptoms should be ok on low FODMAT diet  - she will let me know how her symptoms are over the next couple of weeks by phone or mychart.    (E28.2) PCOS (polycystic ovarian syndrome)  Comment: improved with increased metformin  Plan:   - decrease metformin to 500 mg twice daily  - if diarrhea goes away, will plan to switch to long acting metformin instead    FUTURE APPOINTMENTS:       - Follow-up visit in 2-4 weeks as needed    [facility] JEAN MARIE

## 2019-03-18 LAB
TTG IGA SER-ACNC: <1 U/ML
TTG IGG SER-ACNC: <1 U/ML

## 2019-04-10 ENCOUNTER — MYC MEDICAL ADVICE (OUTPATIENT)
Dept: PEDIATRICS | Facility: CLINIC | Age: 34
End: 2019-04-10

## 2019-05-11 ENCOUNTER — HOSPITAL ENCOUNTER (EMERGENCY)
Facility: CLINIC | Age: 34
Discharge: HOME | End: 2019-05-11
Attending: EMERGENCY MEDICINE | Admitting: EMERGENCY MEDICINE
Payer: COMMERCIAL

## 2019-05-11 ENCOUNTER — APPOINTMENT (OUTPATIENT)
Dept: GENERAL RADIOLOGY | Facility: CLINIC | Age: 34
End: 2019-05-11
Attending: EMERGENCY MEDICINE
Payer: COMMERCIAL

## 2019-05-11 VITALS
OXYGEN SATURATION: 95 % | SYSTOLIC BLOOD PRESSURE: 117 MMHG | TEMPERATURE: 98 F | WEIGHT: 264.55 LBS | DIASTOLIC BLOOD PRESSURE: 67 MMHG | HEART RATE: 86 BPM | BODY MASS INDEX: 44.71 KG/M2 | RESPIRATION RATE: 18 BRPM

## 2019-05-11 DIAGNOSIS — J18.9 PNEUMONIA OF RIGHT MIDDLE LOBE DUE TO INFECTIOUS ORGANISM: ICD-10-CM

## 2019-05-11 PROCEDURE — 25000132 ZZH RX MED GY IP 250 OP 250 PS 637: Performed by: EMERGENCY MEDICINE

## 2019-05-11 PROCEDURE — 99283 EMERGENCY DEPT VISIT LOW MDM: CPT

## 2019-05-11 PROCEDURE — 99283 EMERGENCY DEPT VISIT LOW MDM: CPT | Mod: 25

## 2019-05-11 PROCEDURE — 71046 X-RAY EXAM CHEST 2 VIEWS: CPT

## 2019-05-11 RX ORDER — IBUPROFEN 600 MG/1
600 TABLET, FILM COATED ORAL ONCE
Status: COMPLETED | OUTPATIENT
Start: 2019-05-11 | End: 2019-05-11

## 2019-05-11 RX ORDER — AZITHROMYCIN 250 MG/1
TABLET, FILM COATED ORAL
Qty: 6 TABLET | Refills: 0 | Status: SHIPPED | OUTPATIENT
Start: 2019-05-11 | End: 2019-09-19

## 2019-05-11 RX ADMIN — IBUPROFEN 600 MG: 600 TABLET ORAL at 01:31

## 2019-05-11 ASSESSMENT — ENCOUNTER SYMPTOMS
FEVER: 1
COUGH: 1
COLOR CHANGE: 0

## 2019-05-11 NOTE — ED PROVIDER NOTES
History     Chief Complaint:  Cough and fever    HPI   Eloina Aj is a generally healthy 34 year old female, with a history of hypothyroidism, who presents with cough and a fever. The patient states that she had developed a cough and fever over the last few days. Tonight, she notes that her fever had measured 104.6, prompting her ED visit. Here, the patient denies any associated symptoms including sore throat, leg swelling or bruising, or other associated symptoms. She notes that she had taken Tylenol prior to arrival at 2345.     Allergies:  Atarax [Hydroxyzine]  Sulfa Drugs      Medications:    Lexapro  Junel  Levothyroxine  Metformin  Florastor    Past Medical History:    Anorexia  Depression  Hypothyroidism  PCOS  Gestation diabetes  Tympanosclerosis    Past Surgical History:    Appendectomy   section x2  Eye surgery  Major reconstruction orthopedic surgery  Tympanoplasty     Family History:    Arthritis  Bladder cancer  Diabetes, type 2  ASCD  Depression    Social History:  Negative for alcohol use.  Negative for tobacco use.   Marital Status:   [2]     Review of Systems   Constitutional: Positive for fever.   HENT: Positive for congestion.    Respiratory: Positive for cough.    Cardiovascular: Negative for leg swelling.   Skin: Negative for color change.   All other systems reviewed and are negative.      Physical Exam   First Vitals:  BP: 118/77  Pulse: 109  Temp: 98  F (36.7  C)  Resp: 18  Weight: 120 kg (264 lb 8.8 oz)  SpO2: 95 %    Physical Exam  Constitutional: Patient is well appearing. No distress.  Head: Atraumatic.  Mouth/Throat: Oropharynx is clear and moist. No oropharyngeal exudate.  Eyes: Conjunctivae and EOM are normal. No scleral icterus.  Neck: Normal range of motion. Neck supple.   Cardiovascular: Normal rate, regular rhythm, normal heart sounds and intact distal pulses.   Pulmonary/Chest: Breath sounds normal. No respiratory distress.  Abdominal: Soft. Bowel sounds are  normal. No distension. No tenderness. No rebound or guarding.   Musculoskeletal: Normal range of motion. No edema or tenderness.   Neurological: Alert and orientated to person, place, and time. No observable focal neuro deficit  Skin: Warm and dry. No rash noted. Not diaphoretic.      Emergency Department Course     Imaging:  Radiographic findings were communicated with the patient who voiced understanding of the findings.  XR Chest 2 views:   Right middle lobe pneumonia. As per radiology.     Interventions:    0131 Ibuprofen, 600 mg, PO    Emergency Department Course:  Nursing notes and vitals reviewed.     0102  I performed an exam of the patient as documented above.     Medicine administered as documented above.     The patient was sent for a chest XR while in the emergency department, findings above.     0131 I rechecked the patient and discussed the results of her workup thus far.     Findings and plan explained to the Patient. Patient discharged home with instructions regarding supportive care, medications, and reasons to return. The importance of close follow-up was reviewed. The patient was prescribed azithromycin.     Impression & Plan      Medical Decision Making:  Eloina Aj is a 34 year old female who presents for evaluation of cough and fever.  History, physical exam and imaging studies are consistent with pneumonia.  There are no signs of complications of pneumonia at this point such as septic shock, bacteremia, empyema, hypoxia, respiratory failure or compromise.  Supportive outpatient management is therefore indicated. Patient will follow-up with primary care physician regardless of disease course within 2 days maximum.  Signs for return visit to ED were discussed with patient and pneumonia precautions given for home.      Diagnosis:    ICD-10-CM   1. Pneumonia of right middle lobe due to infectious organism (H) J18.1       Disposition:  discharged to home     Discharge Medications:   Details    azithromycin (ZITHROMAX Z-JELLY) 250 MG tablet Two tablets on the first day, then one tablet daily for the next 4 days  Qty: 6 tablet, Refills: 0       I, Nayeli Roth, am serving as a scribe on 5/11/2019 at 1:02 AM to personally document services performed by Lui Matta MD based on my observations and the provider's statements to me.      Nayeli Roth  5/11/2019   [facility] EMERGENCY DEPARTMENT       Lui Matta MD  05/11/19 0144

## 2019-05-11 NOTE — ED AVS SNAPSHOT
[facility] Emergency Department  [address]  [facility] [address]  Phone:  [phone]  Fax:  [phone]                                    Eloina Aj   MRN: [id number]    Department:  [facility] Emergency Department   Date of Visit:  5/11/2019           After Visit Summary Signature Page    I have received my discharge instructions, and my questions have been answered. I have discussed any challenges I see with this plan with the nurse or doctor.    ..........................................................................................................................................  Patient/Patient Representative Signature      ..........................................................................................................................................  Patient Representative Print Name and Relationship to Patient    ..................................................               ................................................  Date                                   Time    ..........................................................................................................................................  Reviewed by Signature/Title    ...................................................              ..............................................  Date                                               Time          22EPIC Rev 08/18

## 2019-05-11 NOTE — ED TRIAGE NOTES
Cough and fever for last two days, cough becoming productive today (yellow sputum).  Fevers up to 104 at home.  Last tylenol at midnight.

## 2019-05-13 ENCOUNTER — OFFICE VISIT (OUTPATIENT)
Dept: PEDIATRICS | Facility: CLINIC | Age: 34
End: 2019-05-13
Payer: COMMERCIAL

## 2019-05-13 VITALS
OXYGEN SATURATION: 96 % | DIASTOLIC BLOOD PRESSURE: 72 MMHG | WEIGHT: 262 LBS | SYSTOLIC BLOOD PRESSURE: 124 MMHG | BODY MASS INDEX: 43.65 KG/M2 | HEIGHT: 65 IN | HEART RATE: 97 BPM | TEMPERATURE: 100.1 F

## 2019-05-13 DIAGNOSIS — R53.83 FATIGUE, UNSPECIFIED TYPE: ICD-10-CM

## 2019-05-13 DIAGNOSIS — J18.9 PNEUMONIA OF RIGHT MIDDLE LOBE DUE TO INFECTIOUS ORGANISM: Primary | ICD-10-CM

## 2019-05-13 DIAGNOSIS — R50.9 FEVER, UNSPECIFIED FEVER CAUSE: ICD-10-CM

## 2019-05-13 DIAGNOSIS — R19.7 DIARRHEA, UNSPECIFIED TYPE: ICD-10-CM

## 2019-05-13 LAB
BASOPHILS # BLD AUTO: 0 10E9/L (ref 0–0.2)
BASOPHILS NFR BLD AUTO: 0.8 %
DIFFERENTIAL METHOD BLD: ABNORMAL
EOSINOPHIL # BLD AUTO: 0.1 10E9/L (ref 0–0.7)
EOSINOPHIL NFR BLD AUTO: 1.3 %
ERYTHROCYTE [DISTWIDTH] IN BLOOD BY AUTOMATED COUNT: 13.1 % (ref 10–15)
HCT VFR BLD AUTO: 35.6 % (ref 35–47)
HGB BLD-MCNC: 11.8 G/DL (ref 11.7–15.7)
LYMPHOCYTES # BLD AUTO: 0.7 10E9/L (ref 0.8–5.3)
LYMPHOCYTES NFR BLD AUTO: 17.7 %
MCH RBC QN AUTO: 28.4 PG (ref 26.5–33)
MCHC RBC AUTO-ENTMCNC: 33.1 G/DL (ref 31.5–36.5)
MCV RBC AUTO: 86 FL (ref 78–100)
MONOCYTES # BLD AUTO: 0.4 10E9/L (ref 0–1.3)
MONOCYTES NFR BLD AUTO: 11.6 %
NEUTROPHILS # BLD AUTO: 2.6 10E9/L (ref 1.6–8.3)
NEUTROPHILS NFR BLD AUTO: 68.6 %
PLATELET # BLD AUTO: 155 10E9/L (ref 150–450)
RBC # BLD AUTO: 4.16 10E12/L (ref 3.8–5.2)
WBC # BLD AUTO: 3.8 10E9/L (ref 4–11)

## 2019-05-13 PROCEDURE — 87040 BLOOD CULTURE FOR BACTERIA: CPT | Performed by: INTERNAL MEDICINE

## 2019-05-13 PROCEDURE — 85025 COMPLETE CBC W/AUTO DIFF WBC: CPT | Performed by: INTERNAL MEDICINE

## 2019-05-13 PROCEDURE — 36415 COLL VENOUS BLD VENIPUNCTURE: CPT | Performed by: INTERNAL MEDICINE

## 2019-05-13 PROCEDURE — 99214 OFFICE O/P EST MOD 30 MIN: CPT | Performed by: INTERNAL MEDICINE

## 2019-05-13 RX ORDER — ALBUTEROL SULFATE 90 UG/1
2 AEROSOL, METERED RESPIRATORY (INHALATION) EVERY 4 HOURS PRN
Qty: 8.5 G | Refills: 1 | Status: SHIPPED | OUTPATIENT
Start: 2019-05-13 | End: 2020-04-17

## 2019-05-13 ASSESSMENT — MIFFLIN-ST. JEOR: SCORE: 1871.36

## 2019-05-13 NOTE — PATIENT INSTRUCTIONS
1. Finish off z-pack  2. Start Augmentin twice a day for 10 days  - let me know if diarrhea a lot worse and can try switching to levofloxacin instead  3. Albuterol inhaler every 4 hours as needed for cough or shortness of breath  4. Labs today: blood counts and blood cultures  5. Follow-up by end of week if not improving, sooner if getting worse  6. Recommend pneumovax pneumonia vaccine in the future

## 2019-05-13 NOTE — PROGRESS NOTES
"  SUBJECTIVE:   Eloina Aj is a 34 year old female who presents to clinic today for the following   health issues:    ED/UC Followup:    Facility:  [facility] ER  Date of visit: 5/11/19  Reason for visit: Cough  Current Status: States she is still dizzy, SOB, fatigue, fever,      Started having chills on Wednesday. On Thursday was on field trip with daughter and felt very cold, when got home was 103.2. Was having post-nasal drip, which is still there. Cough started on Friday. Noticed more short of breath starting on Thursday before got the cough. Seen in ER on 5/11 and diagnosed with right middle lobe pneumonia. Temp this am was 99. Was up to102 yesterday. Shortness of breath not improving. Almost feels worse. Not getting any improvement in energy. Was started on azithro. Has gotten pneumonia about once a year for past few years. Last year also given an albuterol inhaler while helped with cough some. Has not had pneumonia vaccine.     Also having diarrhea. Started on Saturday - the same day started the z-pack. Had nausea after taking initially. Took with food today and was better. Having bowel movements fairly frequent - twice before left and had to take an immodium to come here. Watery without blood.     Additional history: as documented    Reviewed  and updated as needed this visit by clinical staff  Tobacco  Allergies  Meds  Problems  Med Hx  Surg Hx  Fam Hx  Soc Hx        Reviewed and updated as needed this visit by Provider  Tobacco  Allergies  Meds  Problems  Med Hx  Surg Hx  Fam Hx         -------------------------------------    ROS:  Constitutional, HEENT, cardiovascular, pulmonary, gi and gu systems are negative, except as otherwise noted.    OBJECTIVE:                                                    /72 (BP Location: Right arm, Patient Position: Sitting, Cuff Size: Adult Large)   Pulse 97   Temp 100.1  F (37.8  C) (Tympanic)   Ht 1.638 m (5' 4.5\")   Wt 118.8 kg (262 " lb)   SpO2 96%   BMI 44.28 kg/m     Body mass index is 44.28 kg/m .  General Appearance: tired appearing, alert and no distress  Eyes:   no discharge, erythema.  Normal pupils.  Right Ear: normal: no effusions, no erythema, normal landmarks  Left Ear: clear effusion  Nose: mucosal injection and mucosal edema  Sinuses:  not tender  Oropharynx: mild erythema, clear PND  Neck: Supple.  No adenopathy, no asymmetry, masses, or scars  Respiratory: lungs clear to auscultation - no rales, rhonchi or wheezes.  Cardiovascular: regular rate and rhythm, normal S1 S2, no S3 or S4 and no murmur, click or rub.  No peripheral edema.  Abd: obese, soft, NT/ND  Skin: no rashes or lesions.  Well perfused and normal turgor.    Diagnostic Test Results:  Results for orders placed or performed in visit on 05/13/19 (from the past 24 hour(s))   CBC with platelets differential   Result Value Ref Range    WBC 3.8 (L) 4.0 - 11.0 10e9/L    RBC Count 4.16 3.8 - 5.2 10e12/L    Hemoglobin 11.8 11.7 - 15.7 g/dL    Hematocrit 35.6 35.0 - 47.0 %    MCV 86 78 - 100 fl    MCH 28.4 26.5 - 33.0 pg    MCHC 33.1 31.5 - 36.5 g/dL    RDW 13.1 10.0 - 15.0 %    Platelet Count 155 150 - 450 10e9/L    % Neutrophils 68.6 %    % Lymphocytes 17.7 %    % Monocytes 11.6 %    % Eosinophils 1.3 %    % Basophils 0.8 %    Absolute Neutrophil 2.6 1.6 - 8.3 10e9/L    Absolute Lymphocytes 0.7 (L) 0.8 - 5.3 10e9/L    Absolute Monocytes 0.4 0.0 - 1.3 10e9/L    Absolute Eosinophils 0.1 0.0 - 0.7 10e9/L    Absolute Basophils 0.0 0.0 - 0.2 10e9/L    Diff Method Automated Method    Blood culture   Result Value Ref Range    Specimen Description Blood     Culture Micro No growth after 1 hour      CXR 5/11: right middle lobe pneumonia     ASSESSMENT/PLAN:                                                      (J18.1) Pneumonia of right middle lobe due to infectious organism (H)  (primary encounter diagnosis)  Comment: CXR with pneumonia. Not responding yet. WBC also depressed  suggesting this could be a viral process, but given focal infiltrate on exam also concerned could have resistance to azithro.  Plan: amoxicillin-clavulanate (AUGMENTIN) 875-125 MG         tablet, albuterol (PROAIR HFA/PROVENTIL         HFA/VENTOLIN HFA) 108 (90 Base) MCG/ACT         inhaler, CBC with platelets differential, Blood        culture  - will send blood cultures  - broaden antibiotics to azithro and augmentin - will call if causes too much diarrhea and will change to Levofloxacin instead  - albuterol inhaler as needed  - I will see her back later this week if she is not improving    (R53.83) Fatigue, unspecified type  (R50.9) Fever, unspecified fever cause  Comment: most likely due to pneumonia.  Plan:   - continue to monitor. Should gradually improve    (R19.7) Diarrhea, unspecified type  Comment: worse over last couple of days. This could be due to the azithromycin. It is also possible it is viral.  Plan: continue to monitor  - let me know if not improving or worsening with the Augmentin    FUTURE APPOINTMENTS:       - Follow-up visit in 3-5 days if not improving    [facility] JEAN MARIE

## 2019-05-19 LAB
BACTERIA SPEC CULT: NO GROWTH
Lab: NORMAL
SPECIMEN SOURCE: NORMAL

## 2019-09-29 ENCOUNTER — HEALTH MAINTENANCE LETTER (OUTPATIENT)
Age: 34
End: 2019-09-29

## 2019-10-10 DIAGNOSIS — L40.9 PSORIASIS: ICD-10-CM

## 2019-10-10 DIAGNOSIS — N86 CERVICAL ULCERATION: ICD-10-CM

## 2019-10-10 DIAGNOSIS — R76.8 POSITIVE ANA (ANTINUCLEAR ANTIBODY): Primary | ICD-10-CM

## 2019-10-10 DIAGNOSIS — M25.50 MULTIPLE JOINT PAIN: ICD-10-CM

## 2019-10-10 LAB
BASOPHILS # BLD AUTO: 0 10E9/L (ref 0–0.2)
BASOPHILS NFR BLD AUTO: 0.5 %
DEPRECATED CALCIDIOL+CALCIFEROL SERPL-MC: 28 UG/L (ref 20–75)
DIFFERENTIAL METHOD BLD: NORMAL
EOSINOPHIL # BLD AUTO: 0.1 10E9/L (ref 0–0.7)
EOSINOPHIL NFR BLD AUTO: 1.4 %
ERYTHROCYTE [DISTWIDTH] IN BLOOD BY AUTOMATED COUNT: 14 % (ref 10–15)
ERYTHROCYTE [SEDIMENTATION RATE] IN BLOOD BY WESTERGREN METHOD: 13 MM/H (ref 0–20)
HCT VFR BLD AUTO: 37.4 % (ref 35–47)
HGB BLD-MCNC: 11.9 G/DL (ref 11.7–15.7)
LYMPHOCYTES # BLD AUTO: 1.3 10E9/L (ref 0.8–5.3)
LYMPHOCYTES NFR BLD AUTO: 22.9 %
MCH RBC QN AUTO: 28.2 PG (ref 26.5–33)
MCHC RBC AUTO-ENTMCNC: 31.8 G/DL (ref 31.5–36.5)
MCV RBC AUTO: 89 FL (ref 78–100)
MONOCYTES # BLD AUTO: 0.3 10E9/L (ref 0–1.3)
MONOCYTES NFR BLD AUTO: 4.4 %
NEUTROPHILS # BLD AUTO: 4.1 10E9/L (ref 1.6–8.3)
NEUTROPHILS NFR BLD AUTO: 70.8 %
PLATELET # BLD AUTO: 158 10E9/L (ref 150–450)
RBC # BLD AUTO: 4.22 10E12/L (ref 3.8–5.2)
WBC # BLD AUTO: 5.7 10E9/L (ref 4–11)

## 2019-10-10 PROCEDURE — 82306 VITAMIN D 25 HYDROXY: CPT | Performed by: INTERNAL MEDICINE

## 2019-10-10 PROCEDURE — 86200 CCP ANTIBODY: CPT | Performed by: INTERNAL MEDICINE

## 2019-10-10 PROCEDURE — 86431 RHEUMATOID FACTOR QUANT: CPT | Performed by: INTERNAL MEDICINE

## 2019-10-10 PROCEDURE — 86038 ANTINUCLEAR ANTIBODIES: CPT | Performed by: INTERNAL MEDICINE

## 2019-10-10 PROCEDURE — 85025 COMPLETE CBC W/AUTO DIFF WBC: CPT | Performed by: INTERNAL MEDICINE

## 2019-10-10 PROCEDURE — 85652 RBC SED RATE AUTOMATED: CPT | Performed by: INTERNAL MEDICINE

## 2019-10-10 PROCEDURE — 36415 COLL VENOUS BLD VENIPUNCTURE: CPT | Performed by: INTERNAL MEDICINE

## 2019-10-10 PROCEDURE — 86039 ANTINUCLEAR ANTIBODIES (ANA): CPT | Performed by: INTERNAL MEDICINE

## 2019-10-11 LAB
ANA PAT SER IF-IMP: ABNORMAL
ANA SER QL IF: POSITIVE
ANA TITR SER IF: ABNORMAL {TITER}
CCP AB SER IA-ACNC: 1 U/ML
RHEUMATOID FACT SER NEPH-ACNC: <20 IU/ML (ref 0–20)

## 2019-11-03 DIAGNOSIS — E03.9 HYPOTHYROIDISM, UNSPECIFIED TYPE: ICD-10-CM

## 2019-11-04 RX ORDER — LEVOTHYROXINE SODIUM 75 UG/1
TABLET ORAL
Qty: 90 TABLET | Refills: 1 | Status: SHIPPED | OUTPATIENT
Start: 2019-11-04 | End: 2020-04-17

## 2019-11-04 NOTE — TELEPHONE ENCOUNTER
Routing refill request to provider for review/approval because:  A break in medication  Anabell oRss RN

## 2019-11-04 NOTE — TELEPHONE ENCOUNTER
"Requested Prescriptions   Pending Prescriptions Disp Refills     levothyroxine (SYNTHROID/LEVOTHROID) 75 MCG tablet [Pharmacy Med Name: LEVOTHYROXINE SODIUM 75MCG TABS]  Last Written Prescription Date:  10/25/2018  Last Fill Quantity: 90 tablet,  # refills: 3   Last Office Visit: 5/13/2019 Poonam Calderon MD   Future Office Visit:      270 tablet 0     Sig: TAKE ONE TABLET BY MOUTH ONCE DAILY       Thyroid Protocol Passed - 11/3/2019  7:22 PM        Passed - Patient is 12 years or older        Passed - Recent (12 mo) or future (30 days) visit within the authorizing provider's specialty     Patient has had an office visit with the authorizing provider or a provider within the authorizing providers department within the previous 12 mos or has a future within next 30 days. See \"Patient Info\" tab in inbasket, or \"Choose Columns\" in Meds & Orders section of the refill encounter.              Passed - Medication is active on med list        Passed - Normal TSH on file in past 12 months     Recent Labs   Lab Test 03/15/19  1010   TSH 2.55              Passed - No active pregnancy on record     If patient is pregnant or has had a positive pregnancy test, please check TSH.          Passed - No positive pregnancy test in past 12 months     If patient is pregnant or has had a positive pregnancy test, please check TSH.            "

## 2019-12-07 DIAGNOSIS — E28.2 PCOS (POLYCYSTIC OVARIAN SYNDROME): ICD-10-CM

## 2019-12-08 NOTE — TELEPHONE ENCOUNTER
"Requested Prescriptions   Pending Prescriptions Disp Refills     metFORMIN (GLUCOPHAGE) 500 MG tablet [Pharmacy Med Name: METFORMIN HCL 500MG TABS]    Last Written Prescription Date:  12/6/18  Last Fill Quantity: 120 tablet,  # refills: 11   Last office visit: 5/13/2019 with prescribing provider:  Yumiko     Future Office Visit:     1320 tablet 0     Sig: TAKE TWO TABLETS BY MOUTH TWICE A DAY WITH MEALS       Biguanide Agents Failed - 12/7/2019  8:00 AM        Failed - Blood pressure less than 140/90 in past 6 months     BP Readings from Last 3 Encounters:   05/13/19 124/72   05/11/19 117/67   03/15/19 106/74                 Failed - Patient has documented LDL within the past 12 mos.     Recent Labs   Lab Test 12/06/18  1002   *             Failed - Patient has had a Microalbumin in the past 15 mos.     No lab results found.          Failed - Patient has documented A1c within the specified period of time.     If HgbA1C is 8 or greater, it needs to be on file within the past 3 months.  If less than 8, must be on file within the past 6 months.     No lab results found.          Failed - Recent (6 mo) or future (30 days) visit within the authorizing provider's specialty     Patient had office visit in the last 6 months or has a visit in the next 30 days with authorizing provider or within the authorizing provider's specialty.  See \"Patient Info\" tab in inbasket, or \"Choose Columns\" in Meds & Orders section of the refill encounter.            Passed - Patient is age 10 or older        Passed - Patient's CR is NOT>1.4 OR Patient's EGFR is NOT<45 within past 12 mos.     Recent Labs   Lab Test 03/15/19  1010   GFRESTIMATED 78   GFRESTBLACK >90       Recent Labs   Lab Test 03/15/19  1010   CR 0.94             Passed - Patient does NOT have a diagnosis of CHF.        Passed - Medication is active on med list        Passed - Patient is not pregnant        Passed - Patient has not had a positive pregnancy test within the " past 12 mos.

## 2019-12-23 ENCOUNTER — OFFICE VISIT - HEALTHEAST (OUTPATIENT)
Dept: RHEUMATOLOGY | Facility: CLINIC | Age: 34
End: 2019-12-23

## 2019-12-23 ENCOUNTER — RECORDS - HEALTHEAST (OUTPATIENT)
Dept: GENERAL RADIOLOGY | Facility: CLINIC | Age: 34
End: 2019-12-23

## 2019-12-23 DIAGNOSIS — M25.562 CHRONIC PAIN OF BOTH KNEES: ICD-10-CM

## 2019-12-23 DIAGNOSIS — G89.29 CHRONIC PAIN OF BOTH KNEES: ICD-10-CM

## 2019-12-23 DIAGNOSIS — M54.41 LUMBAGO WITH SCIATICA, RIGHT SIDE: ICD-10-CM

## 2019-12-23 DIAGNOSIS — M25.561 PAIN IN RIGHT KNEE: ICD-10-CM

## 2019-12-23 DIAGNOSIS — M79.7 FIBROMYALGIA: ICD-10-CM

## 2019-12-23 DIAGNOSIS — G89.29 OTHER CHRONIC PAIN: ICD-10-CM

## 2019-12-23 DIAGNOSIS — E55.9 VITAMIN D DEFICIENCY: ICD-10-CM

## 2019-12-23 DIAGNOSIS — G89.29 CHRONIC BILATERAL LOW BACK PAIN WITH BILATERAL SCIATICA: ICD-10-CM

## 2019-12-23 DIAGNOSIS — M54.42 LUMBAGO WITH SCIATICA, LEFT SIDE: ICD-10-CM

## 2019-12-23 DIAGNOSIS — M54.41 CHRONIC BILATERAL LOW BACK PAIN WITH BILATERAL SCIATICA: ICD-10-CM

## 2019-12-23 DIAGNOSIS — M25.562 PAIN IN LEFT KNEE: ICD-10-CM

## 2019-12-23 DIAGNOSIS — M25.561 CHRONIC PAIN OF BOTH KNEES: ICD-10-CM

## 2019-12-23 DIAGNOSIS — M54.42 CHRONIC BILATERAL LOW BACK PAIN WITH BILATERAL SCIATICA: ICD-10-CM

## 2019-12-23 DIAGNOSIS — R76.8 ANA POSITIVE: ICD-10-CM

## 2019-12-23 DIAGNOSIS — M70.61 GREATER TROCHANTERIC BURSITIS OF RIGHT HIP: ICD-10-CM

## 2019-12-23 LAB
ALT SERPL W P-5'-P-CCNC: 11 U/L (ref 0–45)
AST SERPL W P-5'-P-CCNC: 15 U/L (ref 0–40)
C REACTIVE PROTEIN LHE: 1.2 MG/DL (ref 0–0.8)
CREAT SERPL-MCNC: 0.85 MG/DL (ref 0.6–1.1)
GFR SERPL CREATININE-BSD FRML MDRD: >60 ML/MIN/1.73M2

## 2019-12-23 ASSESSMENT — MIFFLIN-ST. JEOR: SCORE: 1876.1

## 2019-12-24 LAB
DNA (DS) ANTIBODY - HISTORICAL: 1 IU
SM (SMITH AUTOANTIBODIES - HISTORICAL: 1 EU
SM/RNP AUTOANTIBODIES - HISTORICAL: 0 EU
SS-A/RO AUTOANTIBODIES - HISTORICAL: 0 EU
SS-B/LA AUTOANTIBODIES - HISTORICAL: 0 EU

## 2019-12-26 LAB
CARDIOLIPIN IGA SER IA-ACNC: <0.5 APL U/ML (ref 0–19.9)
CARDIOLIPIN IGG SER IA-ACNC: <1.6 GPL-U/ML (ref 0–19.9)
CARDIOLIPIN IGM SER IA-ACNC: <0.2 MPL-U/ML (ref 0–19.9)

## 2019-12-27 LAB
B LOCUS: NORMAL
B27TEST METHOD: NORMAL
LA PPP-IMP: POSITIVE

## 2020-01-07 ENCOUNTER — COMMUNICATION - HEALTHEAST (OUTPATIENT)
Dept: RHEUMATOLOGY | Facility: CLINIC | Age: 35
End: 2020-01-07

## 2020-01-07 DIAGNOSIS — M54.41 CHRONIC BILATERAL LOW BACK PAIN WITH BILATERAL SCIATICA: ICD-10-CM

## 2020-01-07 DIAGNOSIS — M54.42 CHRONIC BILATERAL LOW BACK PAIN WITH BILATERAL SCIATICA: ICD-10-CM

## 2020-01-07 DIAGNOSIS — R76.8 ANA POSITIVE: ICD-10-CM

## 2020-01-07 DIAGNOSIS — G89.29 CHRONIC BILATERAL LOW BACK PAIN WITH BILATERAL SCIATICA: ICD-10-CM

## 2020-01-14 ENCOUNTER — COMMUNICATION - HEALTHEAST (OUTPATIENT)
Dept: RHEUMATOLOGY | Facility: CLINIC | Age: 35
End: 2020-01-14

## 2020-01-29 ENCOUNTER — AMBULATORY - HEALTHEAST (OUTPATIENT)
Dept: LAB | Facility: CLINIC | Age: 35
End: 2020-01-29

## 2020-01-29 ENCOUNTER — TRANSFERRED RECORDS (OUTPATIENT)
Dept: HEALTH INFORMATION MANAGEMENT | Facility: CLINIC | Age: 35
End: 2020-01-29

## 2020-01-29 DIAGNOSIS — M54.41 CHRONIC BILATERAL LOW BACK PAIN WITH BILATERAL SCIATICA: ICD-10-CM

## 2020-01-29 DIAGNOSIS — R76.8 ANA POSITIVE: ICD-10-CM

## 2020-01-29 DIAGNOSIS — M54.42 CHRONIC BILATERAL LOW BACK PAIN WITH BILATERAL SCIATICA: ICD-10-CM

## 2020-01-29 DIAGNOSIS — G89.29 CHRONIC BILATERAL LOW BACK PAIN WITH BILATERAL SCIATICA: ICD-10-CM

## 2020-01-29 LAB
ALBUMIN UR-MCNC: NEGATIVE MG/DL
APPEARANCE UR: ABNORMAL
BACTERIA #/AREA URNS HPF: ABNORMAL HPF
BILIRUB UR QL STRIP: NEGATIVE
C REACTIVE PROTEIN LHE: 4.1 MG/DL (ref 0–0.8)
COLOR UR AUTO: YELLOW
CREAT UR-MCNC: 39.5 MG/DL
ERYTHROCYTE [SEDIMENTATION RATE] IN BLOOD BY WESTERGREN METHOD: 15 MM/HR (ref 0–20)
GLUCOSE UR STRIP-MCNC: NEGATIVE MG/DL
HGB UR QL STRIP: NEGATIVE
KETONES UR STRIP-MCNC: NEGATIVE MG/DL
LEUKOCYTE ESTERASE UR QL STRIP: ABNORMAL
MICROALBUMIN UR-MCNC: <0.5 MG/DL (ref 0–1.99)
MICROALBUMIN/CREAT UR: NORMAL MG/G{CREAT}
NITRATE UR QL: NEGATIVE
PH UR STRIP: 5 [PH] (ref 5–8)
RBC #/AREA URNS AUTO: ABNORMAL HPF
SP GR UR STRIP: 1.01 (ref 1–1.03)
SQUAMOUS #/AREA URNS AUTO: ABNORMAL LPF
UROBILINOGEN UR STRIP-ACNC: ABNORMAL
WBC #/AREA URNS AUTO: ABNORMAL HPF

## 2020-01-31 LAB
BACTERIA SPEC CULT: ABNORMAL
BACTERIA SPEC CULT: ABNORMAL
LA PPP-IMP: POSITIVE

## 2020-02-05 ENCOUNTER — OFFICE VISIT - HEALTHEAST (OUTPATIENT)
Dept: RHEUMATOLOGY | Facility: CLINIC | Age: 35
End: 2020-02-05

## 2020-02-05 DIAGNOSIS — M54.50 CHRONIC BILATERAL LOW BACK PAIN, UNSPECIFIED WHETHER SCIATICA PRESENT: ICD-10-CM

## 2020-02-05 DIAGNOSIS — G89.29 CHRONIC BILATERAL LOW BACK PAIN, UNSPECIFIED WHETHER SCIATICA PRESENT: ICD-10-CM

## 2020-02-05 DIAGNOSIS — R82.90 ABNORMAL URINALYSIS: ICD-10-CM

## 2020-02-05 DIAGNOSIS — M25.562 CHRONIC PAIN OF BOTH KNEES: ICD-10-CM

## 2020-02-05 DIAGNOSIS — R76.0 LUPUS ANTICOAGULANT POSITIVE: ICD-10-CM

## 2020-02-05 DIAGNOSIS — M79.7 FIBROMYALGIA: ICD-10-CM

## 2020-02-05 DIAGNOSIS — M25.561 CHRONIC PAIN OF BOTH KNEES: ICD-10-CM

## 2020-02-05 DIAGNOSIS — G89.29 CHRONIC PAIN OF BOTH KNEES: ICD-10-CM

## 2020-02-05 DIAGNOSIS — R76.8 ANA POSITIVE: ICD-10-CM

## 2020-02-05 DIAGNOSIS — M70.61 GREATER TROCHANTERIC BURSITIS OF RIGHT HIP: ICD-10-CM

## 2020-02-05 ASSESSMENT — MIFFLIN-ST. JEOR: SCORE: 1890.62

## 2020-03-15 ENCOUNTER — HEALTH MAINTENANCE LETTER (OUTPATIENT)
Age: 35
End: 2020-03-15

## 2020-04-02 ENCOUNTER — MYC MEDICAL ADVICE (OUTPATIENT)
Dept: PEDIATRICS | Facility: CLINIC | Age: 35
End: 2020-04-02

## 2020-04-02 DIAGNOSIS — F33.1 MAJOR DEPRESSIVE DISORDER, RECURRENT EPISODE, MODERATE (H): ICD-10-CM

## 2020-04-02 DIAGNOSIS — F41.9 ANXIETY: ICD-10-CM

## 2020-04-02 RX ORDER — ESCITALOPRAM OXALATE 20 MG/1
20 TABLET ORAL DAILY
Qty: 30 TABLET | Refills: 0 | Status: SHIPPED | OUTPATIENT
Start: 2020-04-02 | End: 2020-04-17

## 2020-04-02 NOTE — TELEPHONE ENCOUNTER
She has appt scheduled 4/22/20. One refill sent per protocol. Anusha Gallo RN on 4/2/2020 at 9:06 AM

## 2020-04-17 ENCOUNTER — VIRTUAL VISIT (OUTPATIENT)
Dept: PEDIATRICS | Facility: CLINIC | Age: 35
End: 2020-04-17
Payer: COMMERCIAL

## 2020-04-17 DIAGNOSIS — M79.7 FIBROMYALGIA: Primary | ICD-10-CM

## 2020-04-17 DIAGNOSIS — E66.01 MORBID OBESITY (H): ICD-10-CM

## 2020-04-17 DIAGNOSIS — E03.9 HYPOTHYROIDISM, UNSPECIFIED TYPE: ICD-10-CM

## 2020-04-17 DIAGNOSIS — E28.2 PCOS (POLYCYSTIC OVARIAN SYNDROME): ICD-10-CM

## 2020-04-17 DIAGNOSIS — F41.9 ANXIETY: ICD-10-CM

## 2020-04-17 DIAGNOSIS — F33.1 MAJOR DEPRESSIVE DISORDER, RECURRENT EPISODE, MODERATE (H): ICD-10-CM

## 2020-04-17 PROCEDURE — 96127 BRIEF EMOTIONAL/BEHAV ASSMT: CPT | Performed by: PEDIATRICS

## 2020-04-17 PROCEDURE — 99214 OFFICE O/P EST MOD 30 MIN: CPT | Mod: 95 | Performed by: PEDIATRICS

## 2020-04-17 RX ORDER — MULTIVIT-MIN/IRON/FOLIC ACID/K 18-600-40
1 CAPSULE ORAL DAILY
COMMUNITY

## 2020-04-17 RX ORDER — ESCITALOPRAM OXALATE 20 MG/1
30 TABLET ORAL DAILY
Qty: 45 TABLET | Refills: 5 | Status: SHIPPED | OUTPATIENT
Start: 2020-04-17 | End: 2020-12-22

## 2020-04-17 RX ORDER — LEVOTHYROXINE SODIUM 75 UG/1
75 TABLET ORAL DAILY
Qty: 30 TABLET | Refills: 5 | Status: SHIPPED | OUTPATIENT
Start: 2020-04-17 | End: 2020-11-11

## 2020-04-17 ASSESSMENT — PATIENT HEALTH QUESTIONNAIRE - PHQ9
5. POOR APPETITE OR OVEREATING: MORE THAN HALF THE DAYS
SUM OF ALL RESPONSES TO PHQ QUESTIONS 1-9: 9

## 2020-04-17 ASSESSMENT — ANXIETY QUESTIONNAIRES
5. BEING SO RESTLESS THAT IT IS HARD TO SIT STILL: NOT AT ALL
IF YOU CHECKED OFF ANY PROBLEMS ON THIS QUESTIONNAIRE, HOW DIFFICULT HAVE THESE PROBLEMS MADE IT FOR YOU TO DO YOUR WORK, TAKE CARE OF THINGS AT HOME, OR GET ALONG WITH OTHER PEOPLE: SOMEWHAT DIFFICULT
GAD7 TOTAL SCORE: 10
6. BECOMING EASILY ANNOYED OR IRRITABLE: NEARLY EVERY DAY
1. FEELING NERVOUS, ANXIOUS, OR ON EDGE: MORE THAN HALF THE DAYS
3. WORRYING TOO MUCH ABOUT DIFFERENT THINGS: MORE THAN HALF THE DAYS
2. NOT BEING ABLE TO STOP OR CONTROL WORRYING: SEVERAL DAYS
7. FEELING AFRAID AS IF SOMETHING AWFUL MIGHT HAPPEN: NOT AT ALL

## 2020-04-17 NOTE — PATIENT INSTRUCTIONS
Nice to meet you today.    Let's plan to increase your Lexapro to 30mg (1.5 tabs).  We will follow up on this at you physical in August.    I have sent refills of your thyroid medicine and metformin to the pharmacy.    Stay healthy and safe!    Cortney Silveira MD  Internal Medicine/Pediatrics  [facility]

## 2020-04-18 ASSESSMENT — ANXIETY QUESTIONNAIRES: GAD7 TOTAL SCORE: 10

## 2020-06-18 ENCOUNTER — MYC MEDICAL ADVICE (OUTPATIENT)
Dept: PEDIATRICS | Facility: CLINIC | Age: 35
End: 2020-06-18

## 2020-06-19 ENCOUNTER — VIRTUAL VISIT (OUTPATIENT)
Dept: PEDIATRICS | Facility: CLINIC | Age: 35
End: 2020-06-19
Payer: COMMERCIAL

## 2020-06-19 DIAGNOSIS — F41.9 ANXIETY: Primary | ICD-10-CM

## 2020-06-19 DIAGNOSIS — F33.1 MAJOR DEPRESSIVE DISORDER, RECURRENT EPISODE, MODERATE (H): ICD-10-CM

## 2020-06-19 PROCEDURE — 99214 OFFICE O/P EST MOD 30 MIN: CPT | Mod: 95 | Performed by: INTERNAL MEDICINE

## 2020-06-19 PROCEDURE — 96127 BRIEF EMOTIONAL/BEHAV ASSMT: CPT | Performed by: INTERNAL MEDICINE

## 2020-06-19 RX ORDER — BUPROPION HYDROCHLORIDE 150 MG/1
TABLET, EXTENDED RELEASE ORAL
Qty: 180 TABLET | Refills: 0 | Status: SHIPPED | OUTPATIENT
Start: 2020-06-19 | End: 2020-07-17

## 2020-06-19 ASSESSMENT — PATIENT HEALTH QUESTIONNAIRE - PHQ9
SUM OF ALL RESPONSES TO PHQ QUESTIONS 1-9: 16
10. IF YOU CHECKED OFF ANY PROBLEMS, HOW DIFFICULT HAVE THESE PROBLEMS MADE IT FOR YOU TO DO YOUR WORK, TAKE CARE OF THINGS AT HOME, OR GET ALONG WITH OTHER PEOPLE: SOMEWHAT DIFFICULT
SUM OF ALL RESPONSES TO PHQ QUESTIONS 1-9: 16

## 2020-06-19 NOTE — PATIENT INSTRUCTIONS
It was nice to meet you today - I'm glad you called!    1. Start the wellbutrin.   2. Continue the lexapro.   3. Think about counseling/therapy. I agree a good match is important. I'm happy to put in this referral any time.   4. Keep doing the things that are helpful- getting outside, moving your body, workbooks, etc    My team will reach out to schedule you with Dr. Silveira in about a month. Call us sooner if things are getting worse.

## 2020-06-19 NOTE — PROGRESS NOTES
"Eloina Aj is a 35 year old female who is being evaluated via a billable video visit.      The patient has been notified of following:     \"This video visit will be conducted via a call between you and your physician/provider. We have found that certain health care needs can be provided without the need for an in-person physical exam.  This service lets us provide the care you need with a video conversation.  If a prescription is necessary we can send it directly to your pharmacy.  If lab work is needed we can place an order for that and you can then stop by our lab to have the test done at a later time.    Video visits are billed at different rates depending on your insurance coverage.  Please reach out to your insurance provider with any questions.    If during the course of the call the physician/provider feels a video visit is not appropriate, you will not be charged for this service.\"    Patient has given verbal consent for Video visit? Yes    Will anyone else be joining your video visit? No      Subjective     Eloina Aj is a 35 year old female who presents today via video visit for the following health issues:    HPI  Depression and Anxiety Follow-Up    How are you doing with your depression since your last visit? Worsened     How are you doing with your anxiety since your last visit?  Worsened     Are you having other symptoms that might be associated with depression or anxiety? Yes:  tiresness, not wanting to do anything, anxiety with daughter's sport got shaky     Have you had a significant life event? No     Do you have any concerns with your use of alcohol or other drugs? No    Social History     Tobacco Use     Smoking status: Never Smoker     Smokeless tobacco: Never Used   Substance Use Topics     Alcohol use: No     Drug use: No     PHQ 4/17/2020 6/19/2020 6/19/2020   PHQ-9 Total Score 9 17 16   Q9: Thoughts of better off dead/self-harm past 2 weeks Not at all Not at all Not at all "     HSARITA-7 SCORE 2018   Total Score - - 7 (mild anxiety)   Total Score 5 10 7       Other meds/History  Effexor - took when she was young and didn't take it consistently so had terrible side effects  Zoloft - doesn't remember because she was young  Celexa - was on this for a few years... had her daughter in  so stopped and had postpartum depression and restarted   Wellbutrin - thought anxiety was worse then depression --> started this  Got pregnant again so stopped meds. Got on meds postpartum and was seeing Dr. Calderon and she was started on lexapro     Thinks that when she started lexapro it was helpful - 2018  Doesn't think that things have been in a great space and things have worsened.     Thinks the celexa and the wellbutrin combination was the most helpful. Had more energy.     Family has noticed that her MH has worsened. Having trouble wanting to get out of bed or even do anything.     Suicide Assessment Five-step Evaluation and Treatment (SAFE-T)    Video Start Time: 11:26 AM    Medication Followup of  lexapro - would like to switch     Taking Medication as prescribed: yes    Side Effects:  None    Medication Helping Symptoms:  NO - getting worse        Patient Active Problem List   Diagnosis     Major depressive disorder, recurrent episode, moderate (H)     Anxiety     Hypothyroidism, unspecified type     Obesity (H)     PCOS (polycystic ovarian syndrome)     Fibromyalgia     Past Surgical History:   Procedure Laterality Date     APPENDECTOMY        SECTION        SECTION N/A 3/29/2018    Procedure:  SECTION;  Repeat  Section;  Surgeon: Fern Nicolas MD;  Location: [facility] L+D     EYE SURGERY Bilateral     muscle surgery for wandering eyes     ORTHOPEDIC SURGERY Right     major reconstruction right shoulder     TYMPANOPLASTY Left        Social History     Tobacco Use     Smoking status: Never Smoker     Smokeless tobacco: Never Used  "  Substance Use Topics     Alcohol use: No     Family History   Problem Relation Age of Onset     Arthritis Mother         RA     Cancer Mother         bladder Ca     Diabetes Father         b. 8/2/1947. Type II     Cardiovascular Father         MI. ASCD     Depression Father      Breast Cancer No family hx of      Colon Cancer No family hx of          Current Outpatient Medications   Medication Sig Dispense Refill     buPROPion (WELLBUTRIN SR) 150 MG 12 hr tablet Take 1 tablet daily for 3 days and then increase to 1 tablet twice daily. 180 tablet 0     escitalopram (LEXAPRO) 20 MG tablet Take 1.5 tablets (30 mg) by mouth daily 45 tablet 5     JUNEL FE 1/20 1-20 MG-MCG per tablet Take 1 tablet by mouth daily  3     levothyroxine (SYNTHROID/LEVOTHROID) 75 MCG tablet Take 1 tablet (75 mcg) by mouth daily 30 tablet 5     metFORMIN (GLUCOPHAGE) 500 MG tablet Take 1 tablet (500 mg) by mouth 2 times daily (with meals) Office visit needed prior to additional refills. 60 tablet 11     triamcinolone (KENALOG) 0.1 % external cream Apply topically 3 times daily as needed (psoriasis) 30 g 1     Vitamin D, Cholecalciferol, 25 MCG (1000 UT) TABS Take 1 tablet by mouth daily       Allergies   Allergen Reactions     Atarax [Hydroxyzine] Palpitations     Sulfa Drugs Swelling and Rash       Reviewed and updated as needed this visit by Provider         Review of Systems   Constitutional, HEENT, cardiovascular, pulmonary, gi and gu systems are negative, except as otherwise noted.      Objective    LMP 06/02/2020 (Approximate)   Breastfeeding No   Estimated body mass index is 44.28 kg/m  as calculated from the following:    Height as of 5/13/19: 1.638 m (5' 4.5\").    Weight as of 5/13/19: 118.8 kg (262 lb).  Physical Exam     GENERAL: Healthy, alert and no distress  EYES: Eyes grossly normal to inspection.  No discharge or erythema, or obvious scleral/conjunctival abnormalities.  RESP: No audible wheeze, cough, or visible cyanosis.  No " visible retractions or increased work of breathing.    SKIN: Visible skin clear. No significant rash, abnormal pigmentation or lesions.  NEURO: Cranial nerves grossly intact.  Mentation and speech appropriate for age.  PSYCH: Mentation appears normal, affect normal/bright, judgement and insight intact, normal speech and appearance well-groomed.      Diagnostic Test Results:  none         Assessment & Plan       ICD-10-CM    1. Anxiety  F41.9 buPROPion (WELLBUTRIN SR) 150 MG 12 hr tablet   2. Major depressive disorder, recurrent episode, moderate (H)  F33.1 buPROPion (WELLBUTRIN SR) 150 MG 12 hr tablet      Did well with wellbutrin augmentation with celexa in the past. Will retrial this.   If not helpful consider stopping lexapro and trying celexa.   Also discussed SNRI like cymbalta given history of fibromyalgia - was on snri in the past with bad side effects but was young and didn't take it consistently.     --------------------------  PATIENT INSTRUCTIONS    Patient Instructions   It was nice to meet you today - I'm glad you called!    1. Start the wellbutrin.   2. Continue the lexapro.   3. Think about counseling/therapy. I agree a good match is important. I'm happy to put in this referral any time.   4. Keep doing the things that are helpful- getting outside, moving your body, workbooks, etc    My team will reach out to schedule you with Dr. Silveira in about a month. Call us sooner if things are getting worse.     --------------------------      Return in about 4 weeks (around 7/17/2020) for Mental Health Follow Up - Video with Dr. Silveira.    Terrance North MD  [facility] JEAN MARIE      Video-Visit Details    Type of service:  Video Visit    Video End Time:11:38 AM    Originating Location (pt. Location): Home    Distant Location (provider location):  [facility]     Platform used for Video Visit: Kaleigh    Return in about 4 weeks (around 7/17/2020) for Mental Health Follow Up - Video  with Dr. Silveira.       Terrance North MD

## 2020-06-20 ASSESSMENT — PATIENT HEALTH QUESTIONNAIRE - PHQ9: SUM OF ALL RESPONSES TO PHQ QUESTIONS 1-9: 16

## 2020-06-24 ENCOUNTER — COMMUNICATION - HEALTHEAST (OUTPATIENT)
Dept: RHEUMATOLOGY | Facility: CLINIC | Age: 35
End: 2020-06-24

## 2020-07-17 ENCOUNTER — VIRTUAL VISIT (OUTPATIENT)
Dept: PEDIATRICS | Facility: CLINIC | Age: 35
End: 2020-07-17
Payer: COMMERCIAL

## 2020-07-17 DIAGNOSIS — E03.9 HYPOTHYROIDISM, UNSPECIFIED TYPE: ICD-10-CM

## 2020-07-17 DIAGNOSIS — Z86.32 HISTORY OF GESTATIONAL DIABETES: ICD-10-CM

## 2020-07-17 DIAGNOSIS — Z13.6 SCREENING FOR CARDIOVASCULAR CONDITION: ICD-10-CM

## 2020-07-17 DIAGNOSIS — R53.83 OTHER FATIGUE: ICD-10-CM

## 2020-07-17 DIAGNOSIS — E28.2 PCOS (POLYCYSTIC OVARIAN SYNDROME): ICD-10-CM

## 2020-07-17 DIAGNOSIS — F33.1 MAJOR DEPRESSIVE DISORDER, RECURRENT EPISODE, MODERATE (H): ICD-10-CM

## 2020-07-17 DIAGNOSIS — F41.9 ANXIETY: Primary | ICD-10-CM

## 2020-07-17 RX ORDER — BUPROPION HYDROCHLORIDE 150 MG/1
150 TABLET, EXTENDED RELEASE ORAL 2 TIMES DAILY
Qty: 180 TABLET | Refills: 1 | Status: SHIPPED | OUTPATIENT
Start: 2020-07-17 | End: 2021-03-05

## 2020-07-17 ASSESSMENT — ANXIETY QUESTIONNAIRES
6. BECOMING EASILY ANNOYED OR IRRITABLE: NOT AT ALL
2. NOT BEING ABLE TO STOP OR CONTROL WORRYING: SEVERAL DAYS
1. FEELING NERVOUS, ANXIOUS, OR ON EDGE: SEVERAL DAYS
3. WORRYING TOO MUCH ABOUT DIFFERENT THINGS: NOT AT ALL
7. FEELING AFRAID AS IF SOMETHING AWFUL MIGHT HAPPEN: NOT AT ALL

## 2020-07-17 ASSESSMENT — PATIENT HEALTH QUESTIONNAIRE - PHQ9
5. POOR APPETITE OR OVEREATING: SEVERAL DAYS
SUM OF ALL RESPONSES TO PHQ QUESTIONS 1-9: 11

## 2020-07-17 NOTE — PROGRESS NOTES
"Eloina Aj is a 35 year old female who is being evaluated via a billable video visit.      The patient has been notified of following:     \"This video visit will be conducted via a call between you and your physician/provider. We have found that certain health care needs can be provided without the need for an in-person physical exam.  This service lets us provide the care you need with a video conversation.  If a prescription is necessary we can send it directly to your pharmacy.  If lab work is needed we can place an order for that and you can then stop by our lab to have the test done at a later time.    Video visits are billed at different rates depending on your insurance coverage.  Please reach out to your insurance provider with any questions.    If during the course of the call the physician/provider feels a video visit is not appropriate, you will not be charged for this service.\"    Patient has given verbal consent for Video visit? Yes  How would you like to obtain your AVS? MyChart  If you are dropped from the video visit, the video invite should be resent to: Text to cell phone: [phone]  Will anyone else be joining your video visit? No    Subjective     Eloina Aj is a 35 year old female who presents today via video visit for the following health issues:    HPI    Depression and Anxiety Follow-Up    How are you doing with your depression since your last visit? Improved     How are you doing with your anxiety since your last visit?  Improved     Are you having other symptoms that might be associated with depression or anxiety? No    Have you had a significant life event? No     Do you have any concerns with your use of alcohol or other drugs? No      Over the past month -     Starting to get hot all the time - unsure if from wellbutrin or thyroid.  Also right leg \"bouncing up and down\".  She thinks the bouncing might be a nervous twitch and the feeling hot started before the " medication.     noticed a change that she is more calm about things.    Overall she likes the medications right now, although still feels fatigued.  She was on iron supplement in the past.  She did have vit d check last fall - 28 and started daily 1000 international unit(s) which she is still taking.     Social History     Tobacco Use     Smoking status: Never Smoker     Smokeless tobacco: Never Used   Substance Use Topics     Alcohol use: No     Drug use: No     PHQ 6/19/2020 6/19/2020 7/17/2020   PHQ-9 Total Score 17 16 11   Q9: Thoughts of better off dead/self-harm past 2 weeks Not at all Not at all Not at all     SHARITA-7 SCORE 12/6/2018 4/17/2020 6/19/2020   Total Score - - 7 (mild anxiety)   Total Score 5 10 7     Last PHQ-9 7/17/2020   1.  Little interest or pleasure in doing things 2   2.  Feeling down, depressed, or hopeless 1   3.  Trouble falling or staying asleep, or sleeping too much 0   4.  Feeling tired or having little energy 2   5.  Poor appetite or overeating 3   6.  Feeling bad about yourself 2   7.  Trouble concentrating 1   8.  Moving slowly or restless 0   Q9: Thoughts of better off dead/self-harm past 2 weeks 0   PHQ-9 Total Score 11   Difficulty at work, home, or with people Somewhat difficult     SHARITA-7  7/17/2020   1. Feeling nervous, anxious, or on edge 1   2. Not being able to stop or control worrying 1   3. Worrying too much about different things 0   4. Trouble relaxing 1   5. Being so restless that it is hard to sit still -   6. Becoming easily annoyed or irritable 0   7. Feeling afraid, as if something awful might happen 0   SHARITA-7 Total Score -   If you checked any problems, how difficult have they made it for you to do your work, take care of things at home, or get along with other people? -       Suicide Assessment Five-step Evaluation and Treatment (SAFE-T)      How many servings of fruits and vegetables do you eat daily?  2-3    On average, how many sweetened beverages do you  drink each day (Examples: soda, juice, sweet tea, etc.  Do NOT count diet or artificially sweetened beverages)?   0  How many days per week do you miss taking your medication? 2    What makes it hard for you to take your medications?  remembering to take         Video Start Time: 1:25pm    Reviewed and updated as needed this visit by Provider  Meds         Review of Systems   Constitutional, HEENT, cardiovascular, pulmonary, gi and gu systems are negative, except as otherwise noted.      Objective             Physical Exam     GENERAL: Healthy, alert and no distress  EYES: Eyes grossly normal to inspection.  No discharge or erythema, or obvious scleral/conjunctival abnormalities.  RESP: No audible wheeze, cough, or visible cyanosis.  No visible retractions or increased work of breathing.    SKIN: Visible skin clear. No significant rash, abnormal pigmentation or lesions.  NEURO: Cranial nerves grossly intact.  Mentation and speech appropriate for age.  PSYCH: Mentation appears normal, affect normal/bright, judgement and insight intact, normal speech and appearance well-groomed.      Diagnostic Test Results:  Labs reviewed in Epic        Assessment & Plan     1. Anxiety  Improving - continue wellbutrin in addition to Lexapro - I expect to see additional improvement after a few more weeks as well. Will check labs per #6 for ongoing fatigue.  - buPROPion (WELLBUTRIN SR) 150 MG 12 hr tablet; Take 1 tablet (150 mg) by mouth 2 times daily  Dispense: 180 tablet; Refill: 1    2. Major depressive disorder, recurrent episode, moderate (H)  See #1  - buPROPion (WELLBUTRIN SR) 150 MG 12 hr tablet; Take 1 tablet (150 mg) by mouth 2 times daily  Dispense: 180 tablet; Refill: 1    3. History of gestational diabetes  Yearly A1C    4. Hypothyroidism, unspecified type  Due for TSH    5. PCOS (polycystic ovarian syndrome)  Yearly A1C and continue metformin    6. Other fatigue  Check tsh and iron levels         There are no Patient  Instructions on file for this visit.    No follow-ups on file.    Cortney Silveira MD  [facility]      Video-Visit Details    Type of service:  Video Visit    Video End Time:1:35pm    Originating Location (pt. Location): Home    Distant Location (provider location):  [facility]     Platform used for Video Visit: Directed Edge    No follow-ups on file.       Cortney Silveira MD

## 2020-07-17 NOTE — PATIENT INSTRUCTIONS
Roel Duvall,    Nice to see you today.    Please continue with the Lexapro and Wellbutrin.    Please make a fasting lab only appt.    We will plan to see you in about 6 months for a med check (or sooner if needed).    Cortney Silveira MD  Internal Medicine/Pediatrics  [facility]

## 2020-07-28 DIAGNOSIS — E28.2 PCOS (POLYCYSTIC OVARIAN SYNDROME): ICD-10-CM

## 2020-07-28 DIAGNOSIS — Z86.32 HISTORY OF GESTATIONAL DIABETES: ICD-10-CM

## 2020-07-28 DIAGNOSIS — Z13.6 SCREENING FOR CARDIOVASCULAR CONDITION: ICD-10-CM

## 2020-07-28 DIAGNOSIS — R53.83 OTHER FATIGUE: ICD-10-CM

## 2020-07-28 DIAGNOSIS — E03.9 HYPOTHYROIDISM, UNSPECIFIED TYPE: ICD-10-CM

## 2020-07-28 LAB — HBA1C MFR BLD: 5.4 % (ref 0–5.6)

## 2020-07-28 PROCEDURE — 80061 LIPID PANEL: CPT | Performed by: PEDIATRICS

## 2020-07-28 PROCEDURE — 36415 COLL VENOUS BLD VENIPUNCTURE: CPT | Performed by: PEDIATRICS

## 2020-07-28 PROCEDURE — 82728 ASSAY OF FERRITIN: CPT | Performed by: PEDIATRICS

## 2020-07-28 PROCEDURE — 83036 HEMOGLOBIN GLYCOSYLATED A1C: CPT | Performed by: PEDIATRICS

## 2020-07-28 PROCEDURE — 84443 ASSAY THYROID STIM HORMONE: CPT | Performed by: PEDIATRICS

## 2020-07-29 LAB
CHOLEST SERPL-MCNC: 191 MG/DL
FERRITIN SERPL-MCNC: 50 NG/ML (ref 12–150)
HDLC SERPL-MCNC: 56 MG/DL
LDLC SERPL CALC-MCNC: 111 MG/DL
NONHDLC SERPL-MCNC: 135 MG/DL
TRIGL SERPL-MCNC: 119 MG/DL
TSH SERPL DL<=0.005 MIU/L-ACNC: 2.08 MU/L (ref 0.4–4)

## 2020-11-11 DIAGNOSIS — E03.9 HYPOTHYROIDISM, UNSPECIFIED TYPE: ICD-10-CM

## 2020-11-11 RX ORDER — LEVOTHYROXINE SODIUM 75 UG/1
TABLET ORAL
Qty: 30 TABLET | Refills: 5 | Status: SHIPPED | OUTPATIENT
Start: 2020-11-11 | End: 2021-03-05

## 2020-11-11 NOTE — TELEPHONE ENCOUNTER
Prescription approved per [facility] Refill Protocol.    Maki RUBIO RN, BSN

## 2020-11-12 ENCOUNTER — MYC MEDICAL ADVICE (OUTPATIENT)
Dept: PEDIATRICS | Facility: CLINIC | Age: 35
End: 2020-11-12

## 2020-12-20 DIAGNOSIS — F41.9 ANXIETY: ICD-10-CM

## 2020-12-20 DIAGNOSIS — F33.1 MAJOR DEPRESSIVE DISORDER, RECURRENT EPISODE, MODERATE (H): ICD-10-CM

## 2020-12-22 DIAGNOSIS — F33.1 MAJOR DEPRESSIVE DISORDER, RECURRENT EPISODE, MODERATE (H): ICD-10-CM

## 2020-12-22 DIAGNOSIS — F41.9 ANXIETY: ICD-10-CM

## 2020-12-22 RX ORDER — ESCITALOPRAM OXALATE 20 MG/1
TABLET ORAL
Qty: 45 TABLET | Refills: 5 | Status: SHIPPED | OUTPATIENT
Start: 2020-12-22 | End: 2021-03-05

## 2020-12-22 NOTE — TELEPHONE ENCOUNTER
Routing refill request to provider for review/approval because:  Elevated PHQ9    Leslie Odonnell RN on 12/22/2020 at 11:01 AM

## 2020-12-23 RX ORDER — ESCITALOPRAM OXALATE 20 MG/1
TABLET ORAL
Qty: 30 TABLET | Refills: 0 | OUTPATIENT
Start: 2020-12-23

## 2021-01-14 ENCOUNTER — HEALTH MAINTENANCE LETTER (OUTPATIENT)
Age: 36
End: 2021-01-14

## 2021-02-01 ENCOUNTER — VIRTUAL VISIT (OUTPATIENT)
Dept: PEDIATRICS | Facility: CLINIC | Age: 36
End: 2021-02-01
Payer: COMMERCIAL

## 2021-02-01 DIAGNOSIS — F33.1 MAJOR DEPRESSIVE DISORDER, RECURRENT EPISODE, MODERATE (H): ICD-10-CM

## 2021-02-01 DIAGNOSIS — R19.8 ALTERED BOWEL FUNCTION: Primary | ICD-10-CM

## 2021-02-01 DIAGNOSIS — E66.01 MORBID OBESITY (H): ICD-10-CM

## 2021-02-01 PROCEDURE — 99214 OFFICE O/P EST MOD 30 MIN: CPT | Mod: 95 | Performed by: PEDIATRICS

## 2021-02-01 NOTE — PROGRESS NOTES
"Eloina is a 35 year old who is being evaluated via a billable video visit.      How would you like to obtain your AVS? MyChart  If the video visit is dropped, the invitation should be resent by: Text to cell phone: [phone]  Will anyone else be joining your video visit? No    Video Start Time: 10:24am  Assessment & Plan     Altered bowel function  Reviewed previous notes and labs from patient - reviewed ferritin, TSH, iron levels.  She had celiac screening with IgA and TTG about a year ago with Dr. Calderon. At that time her ESR was normal as well.  She does have a FH of ulcerative colitis in her mother. May consider repeat inflammatory markers at upcoming physical depending on how she responds to low FODMAP diet for the next month. Consider referral to GI is not improving.           Patient Instructions     Dear Eloina,    It was nice to talk with you today.    For the bowels - please try a low - FODMAP diet for the next month.  Consider keeping a food diary as well.    Below are foods to avoid.  Check out more info about this online - look up \"low FODMAP diet\"    Characteristics and sources of common FODMAPs    Word that corresponds to letter in acronym Compounds in this category Foods that contain these compounds   F Fermentable   O Oligosaccharides Fructans, galacto-oligosaccharides Wheat, barley, rye, onion, praveen, white part of spring onion, garlic, shallots, artichokes, beetroot, fennel, peas, chicory, pistachio, cashews, legumes, lentils, and chickpeas   D Disaccharides Lactose Milk, custard, ice cream, and yogurt   M Monosaccharides \"Free fructose\" (fructose in excess of glucose) Apples, pears, mangoes, cherries, watermelon, asparagus, sugar snap peas, honey, high-fructose corn syrup   A And   P Polyols Sorbitol, mannitol, maltitol, and xylitol Apples, pears, apricots, cherries, nectarines, peaches, plums, watermelon, mushrooms, cauliflower, artificially sweetened chewing gum and confectionery " "          No follow-ups on file.    Cortney Silveira MD  [facility] Duvall is a 35 year old who presents to clinic today for the following health issues  accompanied by her self    HPI       Diarrhea  Onset/Duration: x6 month to 1 year.  Description:       Consistency of stool: watery and runny       Blood in stool: no       Number of loose stools past 24 hours: 0  Progression of Symptoms: changes--right now it is more constipation -- around Thanksgiving to mid- Jan - constant runny/soft.  Feels like she's not digesting \"orange\" color. Food was still formed. Very quick after she would eat - extremely bloated - stomach hard. Lots of gas. Going 3-4 times per day.  No specific pattern to foods - cut down on carbs on was feeling better.     Recently more normal, but now switching to constipation - trying to drink enough water. Not as much as normal. Still getting bloating feeling. Going 1 time per day or every other day.     Dec 9 until new year - extreme stress (mom in car accident).     Accompanying signs and symptoms:       Fever: no       Nausea/Vomiting: no       Abdominal pain: no       Weight loss: no       Episodes of constipation: YES  History   Ill contacts: no  Recent use of antibiotics: no  Recent travels: no  Recent medication-new or changes(Rx or OTC): no  Precipitating or alleviating factors: None  Therapies tried and outcome: Imodium right ear--with some relief     After patient done eating get sick feeling in stomach and has to use bathroom right away. +bloating, gassy.     Patient concerned she has IBS.         Has upcoming appt 3/5/21 for physical        Review of Systems   Constitutional, HEENT, cardiovascular, pulmonary, gi and gu systems are negative, except as otherwise noted.      Objective           Vitals:  No vitals were obtained today due to virtual visit.    Physical Exam   GENERAL: Healthy, alert and no distress  EYES: Eyes grossly normal to " inspection.  No discharge or erythema, or obvious scleral/conjunctival abnormalities.  RESP: No audible wheeze, cough, or visible cyanosis.  No visible retractions or increased work of breathing.    SKIN: Visible skin clear. No significant rash, abnormal pigmentation or lesions.  NEURO: Cranial nerves grossly intact.  Mentation and speech appropriate for age.  PSYCH: Mentation appears normal, affect normal/bright, judgement and insight intact, normal speech and appearance well-groomed.                Video-Visit Details    Type of service:  Video Visit    Video End Time:10:34am    Originating Location (pt. Location): Home    Distant Location (provider location):  [facility] [place]     Platform used for Video Visit: [employer] ([employer])

## 2021-02-01 NOTE — PATIENT INSTRUCTIONS
"Dear Eloina,    It was nice to talk with you today.    For the bowels - please try a low - FODMAP diet for the next month.  Consider keeping a food diary as well.    Below are foods to avoid.  Check out more info about this online - look up \"low FODMAP diet\"    Characteristics and sources of common FODMAPs    Word that corresponds to letter in acronym Compounds in this category Foods that contain these compounds   F Fermentable   O Oligosaccharides Fructans, galacto-oligosaccharides Wheat, barley, rye, onion, praveen, white part of spring onion, garlic, shallots, artichokes, beetroot, fennel, peas, chicory, pistachio, cashews, legumes, lentils, and chickpeas   D Disaccharides Lactose Milk, custard, ice cream, and yogurt   M Monosaccharides \"Free fructose\" (fructose in excess of glucose) Apples, pears, mangoes, cherries, watermelon, asparagus, sugar snap peas, honey, high-fructose corn syrup   A And   P Polyols Sorbitol, mannitol, maltitol, and xylitol Apples, pears, apricots, cherries, nectarines, peaches, plums, watermelon, mushrooms, cauliflower, artificially sweetened chewing gum and confectionery       "

## 2021-03-04 ASSESSMENT — ANXIETY QUESTIONNAIRES
GAD7 TOTAL SCORE: 10
GAD7 TOTAL SCORE: 10
6. BECOMING EASILY ANNOYED OR IRRITABLE: NEARLY EVERY DAY
4. TROUBLE RELAXING: NEARLY EVERY DAY
1. FEELING NERVOUS, ANXIOUS, OR ON EDGE: MORE THAN HALF THE DAYS
7. FEELING AFRAID AS IF SOMETHING AWFUL MIGHT HAPPEN: NOT AT ALL
5. BEING SO RESTLESS THAT IT IS HARD TO SIT STILL: NOT AT ALL
GAD7 TOTAL SCORE: 10
3. WORRYING TOO MUCH ABOUT DIFFERENT THINGS: SEVERAL DAYS
2. NOT BEING ABLE TO STOP OR CONTROL WORRYING: SEVERAL DAYS
7. FEELING AFRAID AS IF SOMETHING AWFUL MIGHT HAPPEN: NOT AT ALL

## 2021-03-04 ASSESSMENT — ENCOUNTER SYMPTOMS
SORE THROAT: 0
PALPITATIONS: 0
BREAST MASS: 0
CHILLS: 0
WEAKNESS: 0
COUGH: 0
FEVER: 0
DIZZINESS: 0
SHORTNESS OF BREATH: 0
HEADACHES: 0
CONSTIPATION: 1
PARESTHESIAS: 0
HEMATOCHEZIA: 0
NAUSEA: 0
JOINT SWELLING: 0
ABDOMINAL PAIN: 0
DYSURIA: 0
ARTHRALGIAS: 0
HEARTBURN: 0
FREQUENCY: 0
NERVOUS/ANXIOUS: 0
EYE PAIN: 0
HEMATURIA: 0
DIARRHEA: 1
MYALGIAS: 0

## 2021-03-04 ASSESSMENT — PATIENT HEALTH QUESTIONNAIRE - PHQ9
SUM OF ALL RESPONSES TO PHQ QUESTIONS 1-9: 13
SUM OF ALL RESPONSES TO PHQ QUESTIONS 1-9: 13
10. IF YOU CHECKED OFF ANY PROBLEMS, HOW DIFFICULT HAVE THESE PROBLEMS MADE IT FOR YOU TO DO YOUR WORK, TAKE CARE OF THINGS AT HOME, OR GET ALONG WITH OTHER PEOPLE: NOT DIFFICULT AT ALL

## 2021-03-05 ENCOUNTER — OFFICE VISIT (OUTPATIENT)
Dept: PEDIATRICS | Facility: CLINIC | Age: 36
End: 2021-03-05
Payer: COMMERCIAL

## 2021-03-05 VITALS
RESPIRATION RATE: 18 BRPM | WEIGHT: 271 LBS | OXYGEN SATURATION: 99 % | HEART RATE: 70 BPM | DIASTOLIC BLOOD PRESSURE: 74 MMHG | BODY MASS INDEX: 45.15 KG/M2 | SYSTOLIC BLOOD PRESSURE: 118 MMHG | TEMPERATURE: 97.8 F | HEIGHT: 65 IN

## 2021-03-05 DIAGNOSIS — Z11.4 SCREENING FOR HIV (HUMAN IMMUNODEFICIENCY VIRUS): ICD-10-CM

## 2021-03-05 DIAGNOSIS — R53.83 OTHER FATIGUE: ICD-10-CM

## 2021-03-05 DIAGNOSIS — E03.9 HYPOTHYROIDISM, UNSPECIFIED TYPE: ICD-10-CM

## 2021-03-05 DIAGNOSIS — F33.1 MAJOR DEPRESSIVE DISORDER, RECURRENT EPISODE, MODERATE (H): ICD-10-CM

## 2021-03-05 DIAGNOSIS — F41.9 ANXIETY: ICD-10-CM

## 2021-03-05 DIAGNOSIS — Z11.59 NEED FOR HEPATITIS C SCREENING TEST: ICD-10-CM

## 2021-03-05 DIAGNOSIS — E28.2 PCOS (POLYCYSTIC OVARIAN SYNDROME): ICD-10-CM

## 2021-03-05 DIAGNOSIS — Z00.01 ENCOUNTER FOR GENERAL ADULT MEDICAL EXAMINATION WITH ABNORMAL FINDINGS: Primary | ICD-10-CM

## 2021-03-05 DIAGNOSIS — R19.8 ALTERED BOWEL FUNCTION: ICD-10-CM

## 2021-03-05 DIAGNOSIS — R16.1 SPLENOMEGALY: ICD-10-CM

## 2021-03-05 LAB
ALBUMIN SERPL-MCNC: 3.7 G/DL (ref 3.4–5)
ALP SERPL-CCNC: 75 U/L (ref 40–150)
ALT SERPL W P-5'-P-CCNC: 18 U/L (ref 0–50)
ANION GAP SERPL CALCULATED.3IONS-SCNC: 4 MMOL/L (ref 3–14)
AST SERPL W P-5'-P-CCNC: 7 U/L (ref 0–45)
BASOPHILS # BLD AUTO: 0 10E9/L (ref 0–0.2)
BASOPHILS NFR BLD AUTO: 0.5 %
BILIRUB SERPL-MCNC: 0.4 MG/DL (ref 0.2–1.3)
BUN SERPL-MCNC: 12 MG/DL (ref 7–30)
CALCIUM SERPL-MCNC: 9.2 MG/DL (ref 8.5–10.1)
CHLORIDE SERPL-SCNC: 106 MMOL/L (ref 94–109)
CO2 SERPL-SCNC: 29 MMOL/L (ref 20–32)
COPATH REPORT: NORMAL
CREAT SERPL-MCNC: 1.04 MG/DL (ref 0.52–1.04)
CRP SERPL-MCNC: 21 MG/L (ref 0–8)
DIFFERENTIAL METHOD BLD: ABNORMAL
EOSINOPHIL # BLD AUTO: 0.1 10E9/L (ref 0–0.7)
EOSINOPHIL NFR BLD AUTO: 1.6 %
ERYTHROCYTE [DISTWIDTH] IN BLOOD BY AUTOMATED COUNT: 14 % (ref 10–15)
ERYTHROCYTE [SEDIMENTATION RATE] IN BLOOD BY WESTERGREN METHOD: 13 MM/H (ref 0–20)
GFR SERPL CREATININE-BSD FRML MDRD: 68 ML/MIN/{1.73_M2}
GLUCOSE SERPL-MCNC: 90 MG/DL (ref 70–99)
HCT VFR BLD AUTO: 37.5 % (ref 35–47)
HCV AB SERPL QL IA: NONREACTIVE
HETEROPH AB SER QL: NEGATIVE
HGB BLD-MCNC: 11.5 G/DL (ref 11.7–15.7)
HIV 1+2 AB+HIV1 P24 AG SERPL QL IA: NONREACTIVE
LYMPHOCYTES # BLD AUTO: 0.9 10E9/L (ref 0.8–5.3)
LYMPHOCYTES NFR BLD AUTO: 22.1 %
MCH RBC QN AUTO: 27.1 PG (ref 26.5–33)
MCHC RBC AUTO-ENTMCNC: 30.7 G/DL (ref 31.5–36.5)
MCV RBC AUTO: 88 FL (ref 78–100)
MONOCYTES # BLD AUTO: 0.3 10E9/L (ref 0–1.3)
MONOCYTES NFR BLD AUTO: 5.9 %
NEUTROPHILS # BLD AUTO: 3 10E9/L (ref 1.6–8.3)
NEUTROPHILS NFR BLD AUTO: 69.9 %
PLATELET # BLD AUTO: 153 10E9/L (ref 150–450)
POTASSIUM SERPL-SCNC: 4.2 MMOL/L (ref 3.4–5.3)
PROT SERPL-MCNC: 7 G/DL (ref 6.8–8.8)
RBC # BLD AUTO: 4.24 10E12/L (ref 3.8–5.2)
RETICS # AUTO: 69.4 10E9/L (ref 25–95)
RETICS/RBC NFR AUTO: 1.6 % (ref 0.5–2)
SODIUM SERPL-SCNC: 139 MMOL/L (ref 133–144)
TSH SERPL DL<=0.005 MIU/L-ACNC: 2.4 MU/L (ref 0.4–4)
WBC # BLD AUTO: 4.3 10E9/L (ref 4–11)

## 2021-03-05 PROCEDURE — 86308 HETEROPHILE ANTIBODY SCREEN: CPT | Performed by: PEDIATRICS

## 2021-03-05 PROCEDURE — 36415 COLL VENOUS BLD VENIPUNCTURE: CPT | Performed by: PEDIATRICS

## 2021-03-05 PROCEDURE — 86803 HEPATITIS C AB TEST: CPT | Performed by: PEDIATRICS

## 2021-03-05 PROCEDURE — 86140 C-REACTIVE PROTEIN: CPT | Performed by: PEDIATRICS

## 2021-03-05 PROCEDURE — 99395 PREV VISIT EST AGE 18-39: CPT | Performed by: PEDIATRICS

## 2021-03-05 PROCEDURE — 85045 AUTOMATED RETICULOCYTE COUNT: CPT | Performed by: PEDIATRICS

## 2021-03-05 PROCEDURE — 87389 HIV-1 AG W/HIV-1&-2 AB AG IA: CPT | Performed by: PEDIATRICS

## 2021-03-05 PROCEDURE — 99213 OFFICE O/P EST LOW 20 MIN: CPT | Mod: 25 | Performed by: PEDIATRICS

## 2021-03-05 PROCEDURE — 85060 BLOOD SMEAR INTERPRETATION: CPT | Performed by: PATHOLOGY

## 2021-03-05 PROCEDURE — 85652 RBC SED RATE AUTOMATED: CPT | Performed by: PEDIATRICS

## 2021-03-05 PROCEDURE — 80050 GENERAL HEALTH PANEL: CPT | Performed by: PEDIATRICS

## 2021-03-05 RX ORDER — ESCITALOPRAM OXALATE 20 MG/1
TABLET ORAL
Qty: 45 TABLET | Refills: 6 | Status: SHIPPED | OUTPATIENT
Start: 2021-03-05 | End: 2022-01-10

## 2021-03-05 RX ORDER — BUPROPION HYDROCHLORIDE 150 MG/1
150 TABLET, EXTENDED RELEASE ORAL 2 TIMES DAILY
Qty: 60 TABLET | Refills: 6 | Status: SHIPPED | OUTPATIENT
Start: 2021-03-05 | End: 2021-10-11

## 2021-03-05 RX ORDER — LEVOTHYROXINE SODIUM 75 UG/1
75 TABLET ORAL DAILY
Qty: 30 TABLET | Refills: 11 | Status: SHIPPED | OUTPATIENT
Start: 2021-03-05 | End: 2022-01-10

## 2021-03-05 ASSESSMENT — ENCOUNTER SYMPTOMS
SHORTNESS OF BREATH: 0
DIARRHEA: 1
PARESTHESIAS: 0
FEVER: 0
EYE PAIN: 0
WEAKNESS: 0
ABDOMINAL PAIN: 0
DYSURIA: 0
NERVOUS/ANXIOUS: 0
HEADACHES: 0
MYALGIAS: 0
NAUSEA: 0
DIZZINESS: 0
PALPITATIONS: 0
SORE THROAT: 0
FREQUENCY: 0
BREAST MASS: 0
HEMATOCHEZIA: 0
HEMATURIA: 0
ARTHRALGIAS: 0
CONSTIPATION: 1
JOINT SWELLING: 0
CHILLS: 0
HEARTBURN: 0
COUGH: 0

## 2021-03-05 ASSESSMENT — ANXIETY QUESTIONNAIRES: GAD7 TOTAL SCORE: 10

## 2021-03-05 ASSESSMENT — PATIENT HEALTH QUESTIONNAIRE - PHQ9: SUM OF ALL RESPONSES TO PHQ QUESTIONS 1-9: 13

## 2021-03-05 ASSESSMENT — MIFFLIN-ST. JEOR: SCORE: 1915.13

## 2021-03-05 NOTE — PROGRESS NOTES
SUBJECTIVE:   CC: Eloina Aj is an 35 year old woman who presents for preventive health visit.       Patient has been advised of split billing requirements and indicates understanding: Yes  Healthy Habits:     Getting at least 3 servings of Calcium per day:  Yes    Bi-annual eye exam:  Yes    Dental care twice a year:  Yes    Sleep apnea or symptoms of sleep apnea:  Daytime drowsiness    Diet:  Regular (no restrictions)    Frequency of exercise:  None    Taking medications regularly:  Yes    Medication side effects:  None    PHQ-2 Total Score: 4    Additional concerns today:  No    Gi - still bothering her - see previous VV. She is only taking metforming 500mg once daily - some improvement when she cut this down a while ago.        Depression and Anxiety Follow-Up    How are you doing with your depression since your last visit? No change    How are you doing with your anxiety since your last visit?  Improved     Are you having other symptoms that might be associated with depression or anxiety? No    Have you had a significant life event? OTHER: mother in automobile accident.      Do you have any concerns with your use of alcohol or other drugs? No    Social History     Tobacco Use     Smoking status: Never Smoker     Smokeless tobacco: Never Used   Substance Use Topics     Alcohol use: No     Drug use: No     PHQ 6/19/2020 7/17/2020 3/4/2021   PHQ-9 Total Score 16 11 13   Q9: Thoughts of better off dead/self-harm past 2 weeks Not at all Not at all Not at all     SHARITA-7 SCORE 4/17/2020 6/19/2020 3/4/2021   Total Score - 7 (mild anxiety) 10 (moderate anxiety)   Total Score 10 7 10     Last PHQ-9 3/4/2021   1.  Little interest or pleasure in doing things 2   2.  Feeling down, depressed, or hopeless 2   3.  Trouble falling or staying asleep, or sleeping too much 1   4.  Feeling tired or having little energy 3   5.  Poor appetite or overeating 2   6.  Feeling bad about yourself 3   7.  Trouble concentrating 0    8.  Moving slowly or restless 0   Q9: Thoughts of better off dead/self-harm past 2 weeks 0   PHQ-9 Total Score 13   Difficulty at work, home, or with people -     SHARITA-7  3/4/2021   1. Feeling nervous, anxious, or on edge 2   2. Not being able to stop or control worrying 1   3. Worrying too much about different things 1   4. Trouble relaxing 3   5. Being so restless that it is hard to sit still 0   6. Becoming easily annoyed or irritable 3   7. Feeling afraid, as if something awful might happen 0   SHARITA-7 Total Score 10   If you checked any problems, how difficult have they made it for you to do your work, take care of things at home, or get along with other people? -       Suicide Assessment Five-step Evaluation and Treatment (SAFE-T)      Today's PHQ-2 Score:   PHQ-2 ( 1999 [employer]) 3/4/2021   Q1: Little interest or pleasure in doing things 2   Q2: Feeling down, depressed or hopeless 2   PHQ-2 Score 4   Q1: Little interest or pleasure in doing things More than half the days   Q2: Feeling down, depressed or hopeless More than half the days   PHQ-2 Score 4       Abuse: Current or Past (Physical, Sexual or Emotional) - No  Do you feel safe in your environment? Yes    Have you ever done Advance Care Planning? (For example, a Health Directive, POLST, or a discussion with a medical provider or your loved ones about your wishes): No, advance care planning information given to patient to review.  Patient plans to discuss their wishes with loved ones or provider.      Social History     Tobacco Use     Smoking status: Never Smoker     Smokeless tobacco: Never Used   Substance Use Topics     Alcohol use: No         Alcohol Use 3/4/2021   Prescreen: >3 drinks/day or >7 drinks/week? No   Prescreen: >3 drinks/day or >7 drinks/week? -       Any new diagnosis of family breast, ovarian, or bowel cancer? No     Reviewed orders with patient.  Reviewed health maintenance and updated orders accordingly - Yes      Breast CA Risk  Screening:  No flowsheet data found.        Pertinent mammograms are reviewed under the imaging tab.    History of abnormal Pap smear: NO - age 30-65 PAP every 5 years with negative HPV co-testing recommended     Reviewed and updated as needed this visit by clinical staff  Tobacco  Allergies  Meds   Med Hx  Surg Hx  Fam Hx  Soc Hx        Reviewed and updated as needed this visit by Provider                  Review of Systems   Constitutional: Negative for chills and fever.   HENT: Negative for congestion, ear pain, hearing loss and sore throat.    Eyes: Negative for pain and visual disturbance.   Respiratory: Negative for cough and shortness of breath.    Cardiovascular: Negative for chest pain, palpitations and peripheral edema.   Gastrointestinal: Positive for constipation and diarrhea. Negative for abdominal pain, heartburn, hematochezia and nausea.   Breasts:  Negative for tenderness, breast mass and discharge.   Genitourinary: Negative for dysuria, frequency, genital sores, hematuria, pelvic pain, urgency, vaginal bleeding and vaginal discharge.   Musculoskeletal: Negative for arthralgias, joint swelling and myalgias.   Skin: Negative for rash.   Neurological: Negative for dizziness, weakness, headaches and paresthesias.   Psychiatric/Behavioral: Negative for mood changes. The patient is not nervous/anxious.      CONSTITUTIONAL: NEGATIVE for fever, chills, change in weight  INTEGUMENTARU/SKIN: NEGATIVE for worrisome rashes, moles or lesions  EYES: NEGATIVE for vision changes or irritation  ENT: NEGATIVE for ear, mouth and throat problems  RESP: NEGATIVE for significant cough or SOB  BREAST: NEGATIVE for masses, tenderness or discharge  CV: NEGATIVE for chest pain, palpitations or peripheral edema  GI: ongoing cramping  : NEGATIVE for unusual urinary or vaginal symptoms. Periods are regular.  MUSCULOSKELETAL: NEGATIVE for significant arthralgias or myalgia  NEURO: NEGATIVE for weakness, dizziness or  "paresthesias  PSYCHIATRIC: NEGATIVE for changes in mood or affect     OBJECTIVE:   /74 (BP Location: Right arm, Patient Position: Sitting, Cuff Size: Adult Large)   Pulse 70   Temp 97.8  F (36.6  C) (Tympanic)   Resp 18   Ht 1.651 m (5' 5\")   Wt 122.9 kg (271 lb)   LMP 12/21/2020   SpO2 99%   Breastfeeding No   BMI 45.10 kg/m    Physical Exam  GENERAL: healthy, alert and no distress  EYES: Eyes grossly normal to inspection, PERRL and conjunctivae and sclerae normal  HENT: ear canals and TM's normal, nose and mouth without ulcers or lesions  NECK: no adenopathy, no asymmetry, masses, or scars and thyroid normal to palpation  RESP: lungs clear to auscultation - no rales, rhonchi or wheezes  CV: regular rate and rhythm, normal S1 S2, no S3 or S4, no murmur, click or rub, no peripheral edema and peripheral pulses strong  ABDOMEN: +palpable spleen/mass to midline about six inches below costal margin, mild tenderness  MS: no gross musculoskeletal defects noted, no edema  SKIN: no suspicious lesions or rashes  NEURO: Normal strength and tone, mentation intact and speech normal  PSYCH: mentation appears normal, affect normal/bright    Diagnostic Test Results:  Labs reviewed in Epic    ASSESSMENT/PLAN:   1. Encounter for general adult medical examination with abnormal findings  pt sees mai ob for paps - upcoming 9/21    2. Screening for HIV (human immunodeficiency virus)  - HIV Antigen Antibody Combo    3. Need for hepatitis C screening test  - Hepatitis C Screen Reflex to HCV RNA Quant and Genotype    4. Anxiety  Stable - patient has a 3 y/o and 8 y/o - exhausted - medications at max doses - she is making a goal of trying to go for a walk every day  - buPROPion (WELLBUTRIN SR) 150 MG 12 hr tablet; Take 1 tablet (150 mg) by mouth 2 times daily  Dispense: 60 tablet; Refill: 6  - escitalopram (LEXAPRO) 20 MG tablet; TAKE 1 AND 1/2 TABLETS BY MOUTH DAILY  Dispense: 45 tablet; Refill: 6    5. Major " "depressive disorder, recurrent episode, moderate (H)  See above - close follow up in 6 months.  - buPROPion (WELLBUTRIN SR) 150 MG 12 hr tablet; Take 1 tablet (150 mg) by mouth 2 times daily  Dispense: 60 tablet; Refill: 6  - escitalopram (LEXAPRO) 20 MG tablet; TAKE 1 AND 1/2 TABLETS BY MOUTH DAILY  Dispense: 45 tablet; Refill: 6    6. Hypothyroidism, unspecified type  stable  - levothyroxine (SYNTHROID/LEVOTHROID) 75 MCG tablet; Take 1 tablet (75 mcg) by mouth daily  Dispense: 30 tablet; Refill: 11    7. PCOS (polycystic ovarian syndrome)  stable  - metFORMIN (GLUCOPHAGE) 500 MG tablet; Take 1 tablet (500 mg) by mouth 2 times daily (with meals) Office visit needed prior to additional refills.  Dispense: 60 tablet; Refill: 11    8. Altered bowel function  Recheck inflammatory markers - we had discussed referral to GI, however I would like to workup splenomegaly as below first.    - ESR: Erythrocyte sedimentation rate  - CRP, inflammation    9. Splenomegaly  New finding on exam (this is my first time F2F with patient). Reviewed imaging in Epic - splenomegaly noted on imaging in 2004 - patient states she had mono at the time and no one has mentioned this since. Need CT scan, labs as below.    - CT Abdomen Pelvis w Contrast; Future  - Mononucleosis screen  - Blood Morphology Pathologist Review  - CBC with platelets differential  - Reticulocyte Count  - Comprehensive metabolic panel (BMP + Alb, Alk Phos, ALT, AST, Total. Bili, TP)    10. Other fatigue  recheck  - TSH with free T4 reflex    Patient has been advised of split billing requirements and indicates understanding: Yes  COUNSELING:  Reviewed preventive health counseling, as reflected in patient instructions    Estimated body mass index is 45.1 kg/m  as calculated from the following:    Height as of this encounter: 1.651 m (5' 5\").    Weight as of this encounter: 122.9 kg (271 lb).        She reports that she has never smoked. She has never used smokeless " tobacco.      Counseling Resources:  ATP IV Guidelines  Pooled Cohorts Equation Calculator  Breast Cancer Risk Calculator  BRCA-Related Cancer Risk Assessment: FHS-7 Tool  FRAX Risk Assessment  ICSI Preventive Guidelines  Dietary Guidelines for Americans, 2010  USDA's MyPlate  ASA Prophylaxis  Lung CA Screening    Cortney Silveira MD  [facility] [place]  Answers for HPI/ROS submitted by the patient on 3/4/2021   Annual Exam:  If you checked off any problems, how difficult have these problems made it for you to do your work, take care of things at home, or get along with other people?: Not difficult at all  PHQ9 TOTAL SCORE: 13  SHARITA 7 TOTAL SCORE: 10

## 2021-03-12 ENCOUNTER — HOSPITAL ENCOUNTER (OUTPATIENT)
Dept: CT IMAGING | Facility: CLINIC | Age: 36
Discharge: HOME | End: 2021-03-12
Attending: PEDIATRICS | Admitting: PEDIATRICS
Payer: COMMERCIAL

## 2021-03-12 DIAGNOSIS — R16.1 SPLENOMEGALY: Primary | ICD-10-CM

## 2021-03-12 DIAGNOSIS — R16.1 SPLENOMEGALY: ICD-10-CM

## 2021-03-12 PROCEDURE — 74177 CT ABD & PELVIS W/CONTRAST: CPT

## 2021-03-12 PROCEDURE — 250N000011 HC RX IP 250 OP 636: Performed by: PEDIATRICS

## 2021-03-12 PROCEDURE — 250N000009 HC RX 250: Performed by: PEDIATRICS

## 2021-03-12 RX ORDER — IOPAMIDOL 755 MG/ML
500 INJECTION, SOLUTION INTRAVASCULAR ONCE
Status: COMPLETED | OUTPATIENT
Start: 2021-03-12 | End: 2021-03-12

## 2021-03-12 RX ADMIN — IOPAMIDOL 100 ML: 755 INJECTION, SOLUTION INTRAVENOUS at 07:30

## 2021-03-12 RX ADMIN — SODIUM CHLORIDE 55 ML: 9 INJECTION, SOLUTION INTRAVENOUS at 07:30

## 2021-03-19 ENCOUNTER — OFFICE VISIT (OUTPATIENT)
Dept: SURGERY | Facility: CLINIC | Age: 36
End: 2021-03-19
Payer: COMMERCIAL

## 2021-03-19 VITALS
DIASTOLIC BLOOD PRESSURE: 74 MMHG | OXYGEN SATURATION: 99 % | BODY MASS INDEX: 45.15 KG/M2 | HEIGHT: 65 IN | RESPIRATION RATE: 16 BRPM | HEART RATE: 70 BPM | WEIGHT: 271 LBS | SYSTOLIC BLOOD PRESSURE: 118 MMHG

## 2021-03-19 DIAGNOSIS — D73.4 CYST OF SPLEEN: Primary | ICD-10-CM

## 2021-03-19 PROCEDURE — 99203 OFFICE O/P NEW LOW 30 MIN: CPT | Performed by: SURGERY

## 2021-03-19 ASSESSMENT — MIFFLIN-ST. JEOR: SCORE: 1915.13

## 2021-03-19 NOTE — LETTER
2021      OFFICE NOTE      Re:   Eloina Aj,  1985      REASON FOR EVALUATION:  Large symptomatic splenic cyst.      HISTORY OF PRESENT ILLNESS:  Ms. Aj is a healthy 35-year-old female who experienced constellation of symptoms recently including left-sided rib pain, bloating and early satiety.  This began several weeks ago.  She related initially to nursing and holding her son in particular manner.  More recently, she has had some shortness of breath/difficulty taking deep excursions.  She does have a history of irritable bowel syndrome and states these symptoms are somewhat similar.  She has a previous history of an appendectomy and .  She has no personal history of blood dyscrasia or family history of hemoglobin abnormalities.  CT recently showed a massive splenic cyst measuring over 20 cm, which had mass effect displacing the stomach and left kidney towards the midline.  The CT was reviewed personally by me, with the patient as well as with Dr. Rodriguez from Interventional Radiology.      On examination, there is a left upper quadrant fullness, which is nontender.      Although the etiology of the cyst is unclear, it is clearly causing her symptoms including bloating, early satiety and pain.  I think initial management should be directed towards decompression of the cyst, and Dr. Kohler agrees with this assessment.  We will make arrangements for her to be seen by Interventional Radiology for placement of said drain.  Followup imaging will be based on their plan.  We did discuss the possibility of sclerosing the cyst to obviate its recurrence.  Again, I will defer to Dr. Kohler in this regard.  I told her she can follow-up with me p.r.n., but it seems doubtful that she will require surgical intervention, at least initially.            Lewis Wilkes MD

## 2021-03-22 ENCOUNTER — TELEPHONE (OUTPATIENT)
Dept: ULTRASOUND IMAGING | Facility: CLINIC | Age: 36
End: 2021-03-22

## 2021-03-22 DIAGNOSIS — Z11.59 ENCOUNTER FOR SCREENING FOR OTHER VIRAL DISEASES: ICD-10-CM

## 2021-03-22 DIAGNOSIS — Z11.59 ENCOUNTER FOR SCREENING FOR OTHER VIRAL DISEASES: Primary | ICD-10-CM

## 2021-03-22 DIAGNOSIS — D73.4 CYST OF SPLEEN: Primary | ICD-10-CM

## 2021-03-22 LAB
SARS-COV-2 RNA RESP QL NAA+PROBE: NORMAL
SPECIMEN SOURCE: NORMAL

## 2021-03-22 PROCEDURE — U0005 INFEC AGEN DETEC AMPLI PROBE: HCPCS | Performed by: SURGERY

## 2021-03-22 PROCEDURE — U0003 INFECTIOUS AGENT DETECTION BY NUCLEIC ACID (DNA OR RNA); SEVERE ACUTE RESPIRATORY SYNDROME CORONAVIRUS 2 (SARS-COV-2) (CORONAVIRUS DISEASE [COVID-19]), AMPLIFIED PROBE TECHNIQUE, MAKING USE OF HIGH THROUGHPUT TECHNOLOGIES AS DESCRIBED BY CMS-2020-01-R: HCPCS | Performed by: SURGERY

## 2021-03-22 NOTE — TELEPHONE ENCOUNTER
Order for splenic cyst aspiration/drain placement reviewed and approved per Dr. Montenegro with ultrasound guidance and conscious sedation and conscious sedation. Patient is on no blood thinning medications.

## 2021-03-23 ENCOUNTER — TELEPHONE (OUTPATIENT)
Dept: INTERVENTIONAL RADIOLOGY/VASCULAR | Facility: CLINIC | Age: 36
End: 2021-03-23

## 2021-03-23 LAB
LABORATORY COMMENT REPORT: NORMAL
SARS-COV-2 RNA RESP QL NAA+PROBE: NEGATIVE
SPECIMEN SOURCE: NORMAL

## 2021-03-23 NOTE — TELEPHONE ENCOUNTER
Reviewed pre procedure instructions for splenic cyst aspiration scheduled for 3/25/21, patient is on no blood thinning medications and will have . All questions answered and patient verbalizes an understanding of procedure.

## 2021-03-25 ENCOUNTER — HOSPITAL ENCOUNTER (OUTPATIENT)
Dept: ULTRASOUND IMAGING | Facility: CLINIC | Age: 36
Discharge: HOME | End: 2021-03-25
Attending: SURGERY | Admitting: SURGERY
Payer: COMMERCIAL

## 2021-03-25 VITALS — HEART RATE: 70 BPM | SYSTOLIC BLOOD PRESSURE: 120 MMHG | DIASTOLIC BLOOD PRESSURE: 68 MMHG

## 2021-03-25 DIAGNOSIS — D73.4 CYST OF SPLEEN: ICD-10-CM

## 2021-03-25 LAB
INR PPP: 1.05 (ref 0.86–1.14)
PLATELET # BLD AUTO: 156 10E9/L (ref 150–450)

## 2021-03-25 PROCEDURE — 85049 AUTOMATED PLATELET COUNT: CPT | Performed by: RADIOLOGY

## 2021-03-25 PROCEDURE — 272N000431 US DRAIN OF SEROMA/HEMATOMA/ABSCESS/CYST

## 2021-03-25 PROCEDURE — 250N000009 HC RX 250: Performed by: RADIOLOGY

## 2021-03-25 PROCEDURE — 85610 PROTHROMBIN TIME: CPT | Performed by: RADIOLOGY

## 2021-03-25 RX ORDER — FENTANYL CITRATE 50 UG/ML
INJECTION, SOLUTION INTRAMUSCULAR; INTRAVENOUS
Status: DISCONTINUED
Start: 2021-03-25 | End: 2021-03-25 | Stop reason: WASHOUT

## 2021-03-25 RX ADMIN — LIDOCAINE HYDROCHLORIDE 25 ML: 10 INJECTION, SOLUTION EPIDURAL; INFILTRATION; INTRACAUDAL; PERINEURAL at 09:27

## 2021-03-25 NOTE — PROGRESS NOTES
Service Date: 2021      OFFICE NOTE      Re:   Eloina Aj,  1985      REASON FOR EVALUATION:  Large symptomatic splenic cyst.      HISTORY OF PRESENT ILLNESS:  Ms. Aj is a healthy 35-year-old female who experienced constellation of symptoms recently including left-sided rib pain, bloating and early satiety.  This began several weeks ago.  She related initially to nursing and holding her son in particular manner.  More recently, she has had some shortness of breath/difficulty taking deep excursions.  She does have a history of irritable bowel syndrome and states these symptoms are somewhat similar.  She has a previous history of an appendectomy and .  She has no personal history of blood dyscrasia or family history of hemoglobin abnormalities.  CT recently showed a massive splenic cyst measuring over 20 cm, which had mass effect displacing the stomach and left kidney towards the midline.  The CT was reviewed personally by me, with the patient as well as with Dr. Rodriguez from Interventional Radiology.      On examination, there is a left upper quadrant fullness, which is nontender.      Although the etiology of the cyst is unclear, it is clearly causing her symptoms including bloating, early satiety and pain.  I think initial management should be directed towards decompression of the cyst, and Dr. Kohler agrees with this assessment.  We will make arrangements for her to be seen by Interventional Radiology for placement of said drain.  Followup imaging will be based on their plan.  We did discuss the possibility of sclerosing the cyst to obviate its recurrence.  Again, I will defer to Dr. Kohler in this regard.  I told her she can follow-up with me p.r.n., but it seems doubtful that she will require surgical intervention, at least initially.            Lewis Wilkes MD      cc:   Cortney Silveira MD   [address]  [place]  [address]            DAYNA VIDAL MD             D: 2021   T: 2021   MT: PIPE      Name:     DIMITRI ALLEN   MRN:      [id number]        Account:      [id number]   :      1985           Service Date: 2021      Document: [id number]

## 2021-03-25 NOTE — PROGRESS NOTES
Consent for splenic cyst aspiration obtained by Dr. Lane.  Pre procedure INR and plt results noted.  Pt tolerated total 3300 ml's dark bloody fluid removal with 2 different puncture sites done to maximize fluid removal.  No sedation given.  Sterile dressings to sites with pressure held.  Vital signs stable.   office notified of procedure amounts.  No testing needed on fluid.  Pt and  understand post care instructions.  Will call MD office if pain or symptoms or fever occur.  Pt discharged ambulatory to home with no evident bleeding or complications. .

## 2021-03-28 ENCOUNTER — MYC MEDICAL ADVICE (OUTPATIENT)
Dept: SURGERY | Facility: CLINIC | Age: 36
End: 2021-03-28

## 2021-03-29 ENCOUNTER — TELEPHONE (OUTPATIENT)
Dept: GENERAL RADIOLOGY | Facility: CLINIC | Age: 36
End: 2021-03-29

## 2021-03-29 DIAGNOSIS — Z11.59 ENCOUNTER FOR SCREENING FOR OTHER VIRAL DISEASES: ICD-10-CM

## 2021-03-29 DIAGNOSIS — D73.4 SPLENIC CYST: Primary | ICD-10-CM

## 2021-03-29 RX ORDER — LIDOCAINE 40 MG/G
CREAM TOPICAL
Status: CANCELLED | OUTPATIENT
Start: 2021-03-29

## 2021-03-29 NOTE — TELEPHONE ENCOUNTER
Per Dr. Kohler CT scan without contrast to be ordered in 2 weeks at [facility] with IR to manage pt care.  Ginny Borrero notified per request Dr. Kohler.  She will order exam and contact pt with plan.  Dangelo at Dr. Saldana's office also notified of this plan.

## 2021-03-29 NOTE — TELEPHONE ENCOUNTER
Per Dr. Kohler.  Patient needs CT scan without contrast in 2 weeks, then depending on findings of CT, patient will need a IR drain with possible sclerosing with sedation.  Contacted patient, agreed to plan.  Patient scheduled for 4/14 at [facility]:  8:45 am: CT abdomen without contrast  10:00 am:  IR drain/possible sclerosing with sedation    Patient will check in at [facility] at 8:30 am.  NPO for 4 hours, needs .  Patient is not on any blood thinners.  Caresuites admission made for possible sedation needed.    Patient verbalizes understanding, has my number if questions    Ginny Borrero RN  IR nurse clinician  [phone]

## 2021-03-29 NOTE — TELEPHONE ENCOUNTER
I called patient to discuss how she is doing after US guided drainage of splenic cyst on 3/25/21 by IR.     Patient states she is feeling fine with no pain currently.  She reports that after the procedure on 3/25 her abdomen was nice and soft.  Today she notes that her abdomen is becoming more firm in the mid to left abdomen.     Per Dr. Wilkes's note on 3/22/21, he discussed case with Dr. Kohler - follow up imaging and possible sclerosing is to be based on IR's plan.   I spoke with MICHA Hull, RN re: follow up imaging.  She discussed follow up plan with .      Plan: CT scan w/o contrast in two weeks.  Patient to call if she develops pain or becomes symptomatic prior to this.    CT to be done at [facility] due to possible need for sclerosing. IR to place order and schedule follow up CT.        I called patient with the above info.  She will wait for call from IR nurse to arrange CT.

## 2021-04-07 ENCOUNTER — TELEPHONE (OUTPATIENT)
Dept: INTERVENTIONAL RADIOLOGY/VASCULAR | Facility: CLINIC | Age: 36
End: 2021-04-07

## 2021-04-07 NOTE — TELEPHONE ENCOUNTER
Pre-call completed on 4/7/2021. NPO at 0430. Will arrive at 0830 for a 1000 case. COVID test ordered and given number to schedule test. Not on blood thinners. No further questions or concerns.     Gretta Arcos RN

## 2021-04-10 DIAGNOSIS — Z11.59 ENCOUNTER FOR SCREENING FOR OTHER VIRAL DISEASES: ICD-10-CM

## 2021-04-10 LAB
LABORATORY COMMENT REPORT: NORMAL
SARS-COV-2 RNA RESP QL NAA+PROBE: NEGATIVE
SARS-COV-2 RNA RESP QL NAA+PROBE: NORMAL
SPECIMEN SOURCE: NORMAL
SPECIMEN SOURCE: NORMAL

## 2021-04-10 PROCEDURE — U0003 INFECTIOUS AGENT DETECTION BY NUCLEIC ACID (DNA OR RNA); SEVERE ACUTE RESPIRATORY SYNDROME CORONAVIRUS 2 (SARS-COV-2) (CORONAVIRUS DISEASE [COVID-19]), AMPLIFIED PROBE TECHNIQUE, MAKING USE OF HIGH THROUGHPUT TECHNOLOGIES AS DESCRIBED BY CMS-2020-01-R: HCPCS | Performed by: RADIOLOGY

## 2021-04-10 PROCEDURE — U0005 INFEC AGEN DETEC AMPLI PROBE: HCPCS | Performed by: RADIOLOGY

## 2021-04-14 ENCOUNTER — HOSPITAL ENCOUNTER (OUTPATIENT)
Dept: CT IMAGING | Facility: CLINIC | Age: 36
End: 2021-04-14
Attending: RADIOLOGY | Admitting: RADIOLOGY
Payer: COMMERCIAL

## 2021-04-14 ENCOUNTER — HOSPITAL ENCOUNTER (OUTPATIENT)
Facility: CLINIC | Age: 36
Discharge: HOME | End: 2021-04-14
Attending: RADIOLOGY | Admitting: RADIOLOGY
Payer: COMMERCIAL

## 2021-04-14 ENCOUNTER — HOSPITAL ENCOUNTER (OUTPATIENT)
Dept: INTERVENTIONAL RADIOLOGY/VASCULAR | Facility: CLINIC | Age: 36
End: 2021-04-14
Attending: RADIOLOGY | Admitting: RADIOLOGY
Payer: COMMERCIAL

## 2021-04-14 VITALS
HEART RATE: 88 BPM | RESPIRATION RATE: 18 BRPM | TEMPERATURE: 97.9 F | DIASTOLIC BLOOD PRESSURE: 77 MMHG | SYSTOLIC BLOOD PRESSURE: 126 MMHG | OXYGEN SATURATION: 99 %

## 2021-04-14 VITALS — HEART RATE: 72 BPM | OXYGEN SATURATION: 99 % | RESPIRATION RATE: 16 BRPM

## 2021-04-14 DIAGNOSIS — D73.4 SPLENIC CYST: Primary | ICD-10-CM

## 2021-04-14 DIAGNOSIS — D73.4 SPLENIC CYST: ICD-10-CM

## 2021-04-14 PROCEDURE — 258N000003 HC RX IP 258 OP 636: Performed by: RADIOLOGY

## 2021-04-14 PROCEDURE — 250N000009 HC RX 250: Performed by: RADIOLOGY

## 2021-04-14 PROCEDURE — C1769 GUIDE WIRE: HCPCS

## 2021-04-14 PROCEDURE — 49405 IMAGE CATH FLUID COLXN VISC: CPT

## 2021-04-14 PROCEDURE — 250N000009 HC RX 250: Performed by: NURSE PRACTITIONER

## 2021-04-14 PROCEDURE — 272N000504 HC NEEDLE CR4

## 2021-04-14 PROCEDURE — 74150 CT ABDOMEN W/O CONTRAST: CPT

## 2021-04-14 PROCEDURE — C1729 CATH, DRAINAGE: HCPCS

## 2021-04-14 PROCEDURE — 999N000163 HC STATISTIC SIMPLE TUBE INSERTION/CHARGE, PORT, CATH, FISTULOGRAM

## 2021-04-14 RX ORDER — DOXYCYCLINE 100 MG/10ML
200 INJECTION, POWDER, LYOPHILIZED, FOR SOLUTION INTRAVENOUS ONCE
Status: COMPLETED | OUTPATIENT
Start: 2021-04-14 | End: 2021-04-14

## 2021-04-14 RX ORDER — DOXYCYCLINE 100 MG/10ML
200 INJECTION, POWDER, LYOPHILIZED, FOR SOLUTION INTRAVENOUS ONCE
Status: DISCONTINUED | OUTPATIENT
Start: 2021-04-14 | End: 2021-04-14 | Stop reason: CLARIF

## 2021-04-14 RX ADMIN — LIDOCAINE HYDROCHLORIDE 30 ML: 10 INJECTION, SOLUTION INFILTRATION; PERINEURAL at 11:57

## 2021-04-14 RX ADMIN — DOXYCYCLINE 200 MG: 100 INJECTION, POWDER, LYOPHILIZED, FOR SOLUTION INTRAVENOUS at 12:59

## 2021-04-14 NOTE — IR NOTE
Interventional Radiology Intra-procedural Nursing Note    Patient Name: Eloina Aj  Medical Record Number: [id number]  Today's Date: April 14, 2021    Procedure: Splenic cyst aspiration and sclerosing with possible Acetone 98% or other agent; possible drain placement.  Start time: 1153  End time: 1216  Report provided to: GUNNAR RN  Patient depart time and location: 1220 to Care Suites    Note: Patient entered IR Suite number 2 via cart. Patient awake, alert and orientated. Placed on procedural table in supine position. Prepped and draped.  Dr. Kohler in room. Time out and procedure started. Heart rate and oxygen saturation stable.  Drain placed to LUQ by Dr. Kohler. 1,500 mL of dark reji fluid removed. Catheter clamped.  Procedure well tolerated by patient without complications. Procedure end with debrief by Dr. Kohler.  Pressure held until hemostasis achieved. Island dressing applied.    Administered medication totals:  Lidocaine 1% 30 mL (10 mL intradermal with 20 mL intracavitary)      Judi Mendez RN

## 2021-04-14 NOTE — PROGRESS NOTES
Instructed   -Eloina and her  Fidel how to manipulate the stopcock and flush the splenic cyst drain. They also gave a return demonstration.   -How to empty and record outputs from the RAMA bulb  -Cover the dressing with plastic when showering. No need to change dressing as Eloina will come back on Friday 4/16 for another sclerosing.  -Written instructions given by Sultana SCHAFFER and my and Ginny's number on the instruction sheet for questions.     To return to [facility] at 930 on Friday for an IR 10 am appointment.     Thanks [facility] Interventional Radiology CNP ([phone]) (phone [phone])

## 2021-04-14 NOTE — PROGRESS NOTES
Care Suites Admission Nursing Note    Patient Information  Name: Eloina Aj  Age: 35 year old  Reason for admission: possible drainage of splenic cyst  Care Suites arrival time: 0855    Visitor Information  Name: Fidel  Informed of visitor restrictions: Yes  1 visitor allowed per patient   Visitor must screen negative for COVID symptoms   Visitor must wear a mask  Waiting rooms closed to visitors    Patient Admission/Assessment   Pre-procedure assessment complete: No, explain awaiting instructions/farrukh to see pt.    If abnormal assessment/labs, provider notified: No  NPO: Yes  Medications held per instructions/orders: N/A  Consent: deferred  If applicable, pregnancy test status: denied chance of pregnancy  Patient oriented to room: Yes  Education/questions answered: will instruct pt after plan is communicated.  Plan/other: pt updated, plan for Farrukh NP to come see pt and review course of treatment for today.    Discharge Planning  Discharge name/phone number: FIDEL- here now today  Overnight post sedation caregiver: Fidel     Discharge location: home    Sultana Mirza RN

## 2021-04-14 NOTE — PROGRESS NOTES
Returned to CS dept with lidocaine infused into sclersoin site.  Pt is now instructed to turn to each side for 10 min.  Will plan to remove lidocaine and inject doxycyline.  Pt denies any discomfort, VSS.  Call light in reach.    doxycyline infused and removed as ordered.  Tolerated well, no new discomfort.  RAMA drain attached, dressing and flushing supplies provided by [facility] NP.  Emptied 35 ml drainage before discharge to home.  Pt has cup to empty drain.  Declined WC at time of discharge.  discharge instable condition with spouse.

## 2021-04-14 NOTE — PROGRESS NOTES
PATIENT/VISITOR WELLNESS SCREENING    Step 1 Patient Screening    1. In the last month, have you been in contact with someone who was confirmed or suspected to have Coronavirus/COVID-19? No    2. Do you have the following symptoms?  Fever/Chills? No   Cough? No   Shortness of breath? No   New loss of taste or smell? No  Sore throat? No  Muscle or body aches? No  Headaches? No  Fatigue? No  Vomiting or diarrhea? No    Step 2 Visitor Screening    1. Name of Visitor (1 visitor per patient): Fidel    2. In the last month, have you been in contact with someone who was confirmed or suspected to have Coronavirus/COVID-19? No    3. Do you have the following symptoms?  Fever/Chills? No   Cough? No   Shortness of breath? No   Skin rash? No   Loss of taste or smell? No  Sore throat? No  Runny or stuffy nose? No  Muscle or body aches? No  Headaches? No  Fatigue? No  Vomiting or diarrhea? No    If the visitor has positive symptoms, notify supervisor/manger  Per policy, the visitor will need to leave the facility     Step 3 Refer to logic grid below for actions    NO SYMPTOM(S)    ACTIONS:  1. Standard rooming process  2. Provider to assess per normal protocol  3. Implement precautions as needed and per guidelines     POSITIVE SYMPTOM(S)  If positive for ANY of the following symptoms: fever, cough, shortness of breath, rash    ACTION:  1. Continue to have the patient wear a mask   2. Room patient as soon as possible  3. Don appropriate PPE when entering room  4. Provider evaluation

## 2021-04-14 NOTE — DISCHARGE INSTRUCTIONS
Left Upper Abdomen Drain Care Instructions    1) Change the gauze and tape dressing every 2-3 days. Wash the skin with soap and water and allow to air dry before re dressing  -Please cover with plastic (saran wrap) prior to showering and change it the dressing gets wet.     2) Flush the drain with 5 mL Normal Saline (NS) once a day in the morning. You may flush more often if you feel it is not draining well or is plugged. (See instructions below)   -Empty, measure and record the outputs daily. Subtract the NS flush amount from the total    Flushing your tube with Saline  1) Clean your work area and wash your hands with soap and water or foam them with antibacterial .   Gather a NS syringe and alcohol pad    2) Take the clear package off the syringe. Remove the cap from the syringe and hold the syringe so it points upwards. Gently push the plunger until the air comes out and you have the right amount of saline.   Replace the syringe cap.     3) Flush the drain  A. Make sure the off arm of the stopcock if pointing to the flush port as shown below.   B. Clean the flush port with an alcohol wipe  C. Screw the tip of the syringe into the flush port        D. Turn the off arm of the stopcock toward the drainage bag (see below)   E. Slowly push in the plunger to inject the saline. If you feel pain or resistance (if it is hard to inject the saline) stop.     F. Turn the off arm of the stopcock back toward the flush port (see below)   G. Unscrew and remove the syringe. If you have a cap put it back on the flush port.       Please call Ginny MUSE RN clinician at  or Tammy MUSE NP at  for questions or concerns about the tube. You can leave a voicemail. We work Monday through Friday [phone] or [phone].     An alternative number to call for questions is Interventional Radiology at

## 2021-04-15 RX ORDER — HEPARIN SODIUM 200 [USP'U]/100ML
1 INJECTION, SOLUTION INTRAVENOUS CONTINUOUS PRN
Status: CANCELLED | OUTPATIENT
Start: 2021-04-15

## 2021-04-16 ENCOUNTER — APPOINTMENT (OUTPATIENT)
Dept: INTERVENTIONAL RADIOLOGY/VASCULAR | Facility: CLINIC | Age: 36
End: 2021-04-16
Attending: NURSE PRACTITIONER
Payer: COMMERCIAL

## 2021-04-16 ENCOUNTER — HOSPITAL ENCOUNTER (OUTPATIENT)
Facility: CLINIC | Age: 36
Discharge: HOME | End: 2021-04-16
Attending: RADIOLOGY | Admitting: RADIOLOGY
Payer: COMMERCIAL

## 2021-04-16 VITALS
DIASTOLIC BLOOD PRESSURE: 80 MMHG | SYSTOLIC BLOOD PRESSURE: 124 MMHG | TEMPERATURE: 99.3 F | RESPIRATION RATE: 17 BRPM | HEART RATE: 77 BPM | OXYGEN SATURATION: 95 %

## 2021-04-16 PROCEDURE — 49424 ASSESS CYST CONTRAST INJECT: CPT

## 2021-04-16 PROCEDURE — 999N000163 HC STATISTIC SIMPLE TUBE INSERTION/CHARGE, PORT, CATH, FISTULOGRAM

## 2021-04-16 PROCEDURE — 49185 SCLEROTX FLUID COLLECTION: CPT

## 2021-04-16 PROCEDURE — 258N000003 HC RX IP 258 OP 636: Performed by: NURSE PRACTITIONER

## 2021-04-16 PROCEDURE — 250N000009 HC RX 250: Performed by: NURSE PRACTITIONER

## 2021-04-16 RX ORDER — DOXYCYCLINE 100 MG/10ML
200 INJECTION, POWDER, LYOPHILIZED, FOR SOLUTION INTRAVENOUS ONCE
Status: COMPLETED | OUTPATIENT
Start: 2021-04-16 | End: 2021-04-16

## 2021-04-16 RX ADMIN — DOXYCYCLINE 200 MG: 100 INJECTION, POWDER, LYOPHILIZED, FOR SOLUTION INTRAVENOUS at 11:34

## 2021-04-16 NOTE — DISCHARGE INSTRUCTIONS
Abscess Drain Discharge Instructions     After you go home:      You may resume your normal diet    Have an adult stay with you for 6 hours if you received sedation        Care of Puncture Site:      For the first 48 hrs, check your puncture site every couple hours while you are awake     Check the tube site twice a day for signs of infection    Keep the dressing around the tube dry    Change the dressing every 2-3 days if it is guaze/tape. If there is a Stay Fix dressing intact - this should be changed weekly.    You may shower but do not get the dressing wet. Place a waterproof cover over the dressing (such as plastic wrap). Change the dressing if it becomes wet.    No tub baths, whirlpools or swimming until the tube is removed     Activity       You may go back to normal activity in 24 hours    Wait 48 hours before lifting, straining, exercise or other strenuous activity    Medicines:      You may resume  medications    Resume your Warfarin/Coumadin at your regular dose today. Follow up with your provider to have your INR rechecked    Resume your Platelet Inhibitors and Aspirin tomorrow at your regular dose    For minor pain, you may take Acetaminophen (Tylenol) or Ibuprofen (Advil)                 Call the provider who ordered this procedure if:      Increased pain or a large or growing hard lump around the site    Blood or fluid is draining from the site    The site is red, swollen, hot or tender    Chills or a fever greater than 101 F (38 C)    Pain that is getting worse    Any questions or concerns    Call  911 or go to the Emergency Room if:      Severe pain or trouble breathing    Bleeding that you cannot control    Other Instructions:      If the tube falls out - cover the opening with gauze & tape    Record drainage output amounts & bring sheet to return appointments    Take your temperature daily    If you have questions call:          [facility] Radiology Dept @ [phone]        The provider  who performed your procedure was _________________.

## 2021-04-16 NOTE — PROGRESS NOTES
Care Suites Discharge Nursing Note    Patient Information  Name: Eloina Aj  Age: 35 year old    Discharge Education:  Discharge instructions reviewed: Yes  Additional education/resources provided: no  Patient/patient representative verbalizes understanding: Yes  Patient discharging on new medications: No  Medication education completed: N/A    Discharge Plans:   Discharge location: home  Discharge ride contacted: Yes  Approximate discharge time: 1230    Discharge Criteria:  Discharge criteria met and vital signs stable: Yes    Patient Belongs:  Patient belongings returned to patient: Yes    Bernadine Bustillo RN

## 2021-04-16 NOTE — PROGRESS NOTES
75cc medication withdrawn at 1205pm and discarded.  RAMA drain emptied and drain back to bulb suction. Tammy paged for further instruction regarding dressing.

## 2021-04-19 ENCOUNTER — HOSPITAL ENCOUNTER (OUTPATIENT)
Facility: CLINIC | Age: 36
Discharge: HOME | End: 2021-04-19
Attending: RADIOLOGY | Admitting: RADIOLOGY
Payer: COMMERCIAL

## 2021-04-19 PROCEDURE — 999N000012 HC STATISTIC ANGIOGRAM, STENT, VERTEBRO PLASTY

## 2021-04-19 PROCEDURE — 258N000003 HC RX IP 258 OP 636: Performed by: NURSE PRACTITIONER

## 2021-04-19 PROCEDURE — 999N000163 HC STATISTIC SIMPLE TUBE INSERTION/CHARGE, PORT, CATH, FISTULOGRAM

## 2021-04-19 PROCEDURE — 250N000009 HC RX 250: Performed by: NURSE PRACTITIONER

## 2021-04-19 RX ORDER — DOXYCYCLINE 100 MG/10ML
200 INJECTION, POWDER, LYOPHILIZED, FOR SOLUTION INTRAVENOUS ONCE
Status: COMPLETED | OUTPATIENT
Start: 2021-04-19 | End: 2021-04-19

## 2021-04-19 RX ORDER — HEPARIN SODIUM 200 [USP'U]/100ML
1 INJECTION, SOLUTION INTRAVENOUS CONTINUOUS PRN
Status: CANCELLED | OUTPATIENT
Start: 2021-04-19

## 2021-04-19 RX ADMIN — DOXYCYCLINE 200 MG: 100 INJECTION, POWDER, LYOPHILIZED, FOR SOLUTION INTRAVENOUS at 12:03

## 2021-04-19 RX ADMIN — LIDOCAINE HYDROCHLORIDE 30 ML: 10 INJECTION, SOLUTION INFILTRATION; PERINEURAL at 11:17

## 2021-04-19 NOTE — PROGRESS NOTES
Care Suites Procedure Nursing Note    Patient Information  Name: Eloina Aj  Age: 35 year old    Procedure  Procedure: Sclerosing   Procedure start time: 1000  Procedure complete time: 1330  Concerns/abnormal assessment: N/A  If abnormal assessment, provider notified: N/A  Plan/Other: per procedure plan of care    Giselle Martinez RN

## 2021-04-19 NOTE — PROGRESS NOTES
Pt tolerating sclerosing to abdominal drain. Pt denies pain, nausea or vomiting. Pt able to time her rotation after agents instilled.

## 2021-04-19 NOTE — PROGRESS NOTES
Care Suites Admission Nursing Note    Patient Information  Name: Eloina Aj  Age: 35 year old  Reason for admission: sclerotherapy   Care Suites arrival time: 1045    Patient Admission/Assessment   Pre-procedure assessment complete: Yes  If abnormal assessment/labs, provider notified: N/A  NPO: Yes  Medications held per instructions/orders: N/A  Consent: deferred  If applicable, pregnancy test status: deferred  Patient oriented to room: Yes  Education/questions answered: Yes  Plan/other: sclerotherapy     Discharge Planning  Discharge name/phone number:    Overnight post sedation caregiver: NA  Discharge location: home    Bernadine Bustillo RN

## 2021-04-19 NOTE — DISCHARGE INSTRUCTIONS
Abscess Drain Discharge Instructions     After you go home:      You may resume your normal diet    Have an adult stay with you for 6 hours if you received sedation       For 24 hours - due to the sedation you received:    Relax and take it easy    Do NOT make any important or legal decisions    Do NOT drive or operate machines at home or at work    Do NOT drink alcohol    Care of Puncture Site:      For the first 48 hrs, check your puncture site every couple hours while you are awake     Check the tube site twice a day for signs of infection    Keep the dressing around the tube dry    Change the dressing every 2-3 days if it is guaze/tape. If there is a Stay Fix dressing intact - this should be changed weekly.    You may shower but do not get the dressing wet. Place a waterproof cover over the dressing (such as plastic wrap). Change the dressing if it becomes wet.    No tub baths, whirlpools or swimming until the tube is removed     Activity       You may go back to normal activity in 24 hours    Wait 48 hours before lifting, straining, exercise or other strenuous activity    Medicines:      You may resume  medications    Resume your Warfarin/Coumadin at your regular dose today. Follow up with your provider to have your INR rechecked    Resume your Platelet Inhibitors and Aspirin tomorrow at your regular dose    For minor pain, you may take Acetaminophen (Tylenol) or Ibuprofen (Advil)                 Call the provider who ordered this procedure if:      Increased pain or a large or growing hard lump around the site    Blood or fluid is draining from the site    The site is red, swollen, hot or tender    Chills or a fever greater than 101 F (38 C)    Pain that is getting worse    Any questions or concerns    Call  911 or go to the Emergency Room if:      Severe pain or trouble breathing    Bleeding that you cannot control    Other Instructions:      If the tube falls out - cover the opening with gauze &  tape    Record drainage output amounts & bring sheet to return appointments    Take your temperature daily    If you have questions call:          [facility] Radiology Dept @ [phone]        The provider who performed your procedure was _________________.

## 2021-04-19 NOTE — PROGRESS NOTES
Medication completed and pt tolerated well. Drain back to RAMA bulb suction.Pt discharged to home safely and independently. Pt aware to return Wednesday 4/21 arranged by IR staff for tube check. Denies pain, nausea or vomiting.

## 2021-04-20 RX ORDER — HEPARIN SODIUM 200 [USP'U]/100ML
1 INJECTION, SOLUTION INTRAVENOUS CONTINUOUS PRN
Status: CANCELLED | OUTPATIENT
Start: 2021-04-20

## 2021-04-21 ENCOUNTER — APPOINTMENT (OUTPATIENT)
Dept: CT IMAGING | Facility: CLINIC | Age: 36
End: 2021-04-21
Attending: NURSE PRACTITIONER
Payer: COMMERCIAL

## 2021-04-21 ENCOUNTER — HOSPITAL ENCOUNTER (OUTPATIENT)
Facility: CLINIC | Age: 36
Discharge: HOME | End: 2021-04-21
Attending: RADIOLOGY | Admitting: RADIOLOGY
Payer: COMMERCIAL

## 2021-04-21 VITALS
DIASTOLIC BLOOD PRESSURE: 73 MMHG | HEART RATE: 81 BPM | TEMPERATURE: 97.6 F | OXYGEN SATURATION: 98 % | RESPIRATION RATE: 18 BRPM | SYSTOLIC BLOOD PRESSURE: 114 MMHG

## 2021-04-21 PROCEDURE — 999N000163 HC STATISTIC SIMPLE TUBE INSERTION/CHARGE, PORT, CATH, FISTULOGRAM

## 2021-04-21 PROCEDURE — 74150 CT ABDOMEN W/O CONTRAST: CPT

## 2021-04-21 NOTE — PROGRESS NOTES
Care Suites Admission Nursing Note    Patient Information  Name: Eloina Aj  Age: 35 year old  Reason for admission: sclerotherapy  Care Suites arrival time: 0800        Patient Admission/Assessment   Pre-procedure assessment complete: Yes  If abnormal assessment/labs, provider notified: N/A  NPO: Yes  Medications held per instructions/orders: N/A  Consent: deferred  If applicable, pregnancy test status: deferred  Patient oriented to room: Yes  Education/questions answered: Yes  Plan/other: sclerotherapy     Discharge Planning  Discharge name/phone number: self   Overnight post sedation caregiver: NA  Discharge location: home    Bernadine Bustillo RN

## 2021-04-21 NOTE — PROGRESS NOTES
Care Suites Discharge Nursing Note    Patient Information  Name: Eloina Aj  Age: 35 year old    Discharge Education:  Discharge instructions reviewed: Yes  Additional education/resources provided: no  Patient/patient representative verbalizes understanding: Yes  Patient discharging on new medications: No  Medication education completed: N/A    Discharge Plans:   Discharge location: home  Discharge ride contacted: Yes  Approximate discharge time: 0930    Discharge Criteria:  Discharge criteria met and vital signs stable: Yes    Patient Belongs:  Patient belongings returned to patient: Yes    Bernadine Bustillo RN

## 2021-04-21 NOTE — PROGRESS NOTES
Interventional Radiology    Eloina was seen in care suites today for a follow up possible IR sinogram and possible sclerosing.     Eloina Aj is a 35 year old woman with a history of abdominal pain and a large splenic cyst which was initially drained on 3/25/21 with 3.3 L removed and resolution of abdominal pain and pressure. The cyst fluid recurred and she was seen on 4/14 for another aspiration and sclerosing. Initial aspiration was 1/5 L. CT was done first to document fluid prior to drain placement.     Sclerosing was done on 4/14, 4/16 and 4/19 with lidocaine and then 50 mL of 200 mg doxycycline each time which she tolerated well. Drain outputs were consistently  ~100 -125 mL daily. She experienced some night sweats over the weekend (4/17-18). Today she came back in for evaluation. Outputs were as above. She ran a fever yesterday of 100.8 which came down to the 99 range. Today T in care suites was 97.     Discussed with patient and Dr Kohler. Since there has been no appreciable change in output it was felt that the best plan would be to remove the drain and Eloina was going to follow up with her surgeon. It was felt the fever was due to inflammation from the sclerosing most likely.    CT scan was done first to document the cyst, which was totally collapsed except for a small sliver of fluid. Drain was in good position.     Dr Kohler removed the splenic cyst drain intact without pain or complications. Gauze and tape placed over and instructions were given for puncture site care.     Greater than 40 minutes was spent with evaluation and management of this patient with more than half of that time discussing the above and coordinating care.     Thanks Tammy [facility] Interventional Radiology CNP ([phone]) (phone [phone])

## 2021-04-21 NOTE — DISCHARGE INSTRUCTIONS
Abscess Drain Discharge Instructions     After you go home:      You may resume your normal diet    Have an adult stay with you for 6 hours if you received sedation       For 24 hours - due to the sedation you received:    Relax and take it easy    Do NOT make any important or legal decisions    Do NOT drive or operate machines at home or at work    Do NOT drink alcohol    Care of Puncture Site:      For the first 48 hrs, check your puncture site every couple hours while you are awake     Check the tube site twice a day for signs of infection    Keep the dressing around the tube dry    Change the dressing every 2-3 days if it is guaze/tape. If there is a Stay Fix dressing intact - this should be changed weekly.    You may shower but do not get the dressing wet. Place a waterproof cover over the dressing (such as plastic wrap). Change the dressing if it becomes wet.    No tub baths, whirlpools or swimming until the tube is removed     Activity       You may go back to normal activity in 24 hours    Wait 48 hours before lifting, straining, exercise or other strenuous activity    Medicines:      You may resume  medications    Resume your Warfarin/Coumadin at your regular dose today. Follow up with your provider to have your INR rechecked    Resume your Platelet Inhibitors and Aspirin tomorrow at your regular dose    For minor pain, you may take Acetaminophen (Tylenol) or Ibuprofen (Advil)                 Call the provider who ordered this procedure if:      Increased pain or a large or growing hard lump around the site    Blood or fluid is draining from the site    The site is red, swollen, hot or tender    Chills or a fever greater than 101 F (38 C)    Pain that is getting worse    Any questions or concerns    Call  911 or go to the Emergency Room if:      Severe pain or trouble breathing    Bleeding that you cannot control    Other Instructions:      If the tube falls out - cover the opening with gauze &  tape    Record drainage output amounts & bring sheet to return appointments    Take your temperature daily    If you have questions call:          [facility] Radiology Dept @ [phone]        The provider who performed your procedure was _________________.

## 2021-04-22 ENCOUNTER — OFFICE VISIT (OUTPATIENT)
Dept: SURGERY | Facility: CLINIC | Age: 36
End: 2021-04-22
Payer: COMMERCIAL

## 2021-04-22 ENCOUNTER — TELEPHONE (OUTPATIENT)
Dept: SURGERY | Facility: CLINIC | Age: 36
End: 2021-04-22

## 2021-04-22 VITALS
HEIGHT: 65 IN | OXYGEN SATURATION: 100 % | DIASTOLIC BLOOD PRESSURE: 68 MMHG | WEIGHT: 271 LBS | SYSTOLIC BLOOD PRESSURE: 124 MMHG | RESPIRATION RATE: 16 BRPM | HEART RATE: 74 BPM | BODY MASS INDEX: 45.15 KG/M2

## 2021-04-22 DIAGNOSIS — D73.4 CYST OF SPLEEN: Primary | ICD-10-CM

## 2021-04-22 PROCEDURE — 99213 OFFICE O/P EST LOW 20 MIN: CPT | Performed by: SURGERY

## 2021-04-22 ASSESSMENT — MIFFLIN-ST. JEOR: SCORE: 1915.13

## 2021-04-22 NOTE — LETTER
2021    RE: Eloina Aj, : 1985      Reason for clinic visit; recurrent splenic cyst after aspiration and drain placement.  Known to me from initial consult in March.  Cyst was aspirated once and subsequently had indwelling pigtail catheter. Sclerosing attempted several times but output persisted >100mL/d. She has persistent LUQ pain and satiety.  Exam today was benign but she just had drain removed yesterday.  Will plan to proceed with laparoscopic cyst excision and decortication. Risks, benefits and anticipated convalescence reviewed. Will need a post-op bed for a night or two. I will ask her PCP to arrange for vaccines pre-op in the event that we need to perform a splenectomy.    Lewis Wilkes MD

## 2021-04-22 NOTE — TELEPHONE ENCOUNTER
Type of surgery: LAPAROSCOPIC HAND ASSISTED SPLEENIC CYST EXCISION         Location of surgery: [facility] OR  Date and time of surgery: 5/12/2021 @ 7:30 AM   Surgeon: DAYNA JUARES MD    Pre-Op Appt Date: PATIENT TO SCHEDULE    Post-Op Appt Date: PATIENT TO SCHEDULE     Packet sent out: Yes  Pre-cert/Authorization completed:  Not Applicable  Date: 4/22/2021       LAPAROSCOPIC HAND ASSISTED SPLEENIC CYST EXCISION       GENERAL PT INST TO HAVE H&P WITH DR BAEZA 180 MIN REQ PA ASSIST RMO NMS

## 2021-04-22 NOTE — PROGRESS NOTES
Reason for clinic visit; recurrent splenic cyst after aspiration and drain placement.  Known to me from initial consult in March.  Cyst was aspirated once and subsequently had indwelling pigtail catheter. Sclerosing attempted several times but output persisted >100mL/d. She has persistent LUQ pain and satiety.  Exam today was benign but she just had drain removed yesterday.  Will plan to proceed with laparoscopic cyst excision and decortication. Risks, benefits and anticipated convalescence reviewed. Will need a post-op bed for a night or two. I will ask her PCP to arrange for vaccines pre-op in the event that we need to perform a splenectomy.

## 2021-04-26 ENCOUNTER — ALLIED HEALTH/NURSE VISIT (OUTPATIENT)
Dept: PEDIATRICS | Facility: CLINIC | Age: 36
End: 2021-04-26
Payer: COMMERCIAL

## 2021-04-26 DIAGNOSIS — Z23 NEED FOR VACCINATION: Primary | ICD-10-CM

## 2021-04-26 PROCEDURE — 99207 PR NO CHARGE NURSE ONLY: CPT

## 2021-04-26 PROCEDURE — 90472 IMMUNIZATION ADMIN EACH ADD: CPT

## 2021-04-26 PROCEDURE — 90734 MENACWYD/MENACWYCRM VACC IM: CPT

## 2021-04-26 PROCEDURE — 90732 PPSV23 VACC 2 YRS+ SUBQ/IM: CPT

## 2021-04-26 PROCEDURE — 90746 HEPB VACCINE 3 DOSE ADULT IM: CPT

## 2021-04-26 PROCEDURE — 90471 IMMUNIZATION ADMIN: CPT

## 2021-04-26 NOTE — PROGRESS NOTES
Patient here today for vaccines - asking for pneumococcal, menigococcal and hepatitis B.    Prior to immunization administration, verified patients identity using patient s name and date of birth. Please see Immunization Activity for additional information.     Screening Questionnaire for Adult Immunization    Are you sick today?   No   Do you have allergies to medications, food, a vaccine component or latex?   Yes   Have you ever had a serious reaction after receiving a vaccination?   No   Do you have a long-term health problem with heart, lung, kidney, or metabolic disease (e.g., diabetes), asthma, a blood disorder, no spleen, complement component deficiency, a cochlear implant, or a spinal fluid leak?  Are you on long-term aspirin therapy?   No   Do you have cancer, leukemia, HIV/AIDS, or any other immune system problem?   No   Do you have a parent, brother, or sister with an immune system problem?   No   In the past 3 months, have you taken medications that affect  your immune system, such as prednisone, other steroids, or anticancer drugs; drugs for the treatment of rheumatoid arthritis, Crohn s disease, or psoriasis; or have you had radiation treatments?   No   Have you had a seizure, or a brain or other nervous system problem?   No   During the past year, have you received a transfusion of blood or blood    products, or been given immune (gamma) globulin or antiviral drug?   No   For women: Are you pregnant or is there a chance you could become       pregnant during the next month?   No   Have you received any vaccinations in the past 4 weeks?   No     Immunization questionnaire was positive for at least one answer.  Notified provider - told to do vaccines today.       Patient instructed to remain in clinic for 15 minutes afterwards, and to report any adverse reaction to me immediately.       Screening performed by Estrella Angeles MA on 4/26/2021 at 11:33 AM.

## 2021-04-29 DIAGNOSIS — Z11.59 ENCOUNTER FOR SCREENING FOR OTHER VIRAL DISEASES: ICD-10-CM

## 2021-05-05 ENCOUNTER — OFFICE VISIT (OUTPATIENT)
Dept: PEDIATRICS | Facility: CLINIC | Age: 36
End: 2021-05-05
Payer: COMMERCIAL

## 2021-05-05 VITALS
WEIGHT: 267.2 LBS | BODY MASS INDEX: 44.46 KG/M2 | SYSTOLIC BLOOD PRESSURE: 110 MMHG | HEART RATE: 65 BPM | OXYGEN SATURATION: 100 % | DIASTOLIC BLOOD PRESSURE: 70 MMHG | RESPIRATION RATE: 18 BRPM | TEMPERATURE: 97.6 F

## 2021-05-05 DIAGNOSIS — Z86.32 HISTORY OF GESTATIONAL DIABETES: ICD-10-CM

## 2021-05-05 DIAGNOSIS — D73.4 CYST OF SPLEEN: ICD-10-CM

## 2021-05-05 DIAGNOSIS — Z01.818 PREOPERATIVE EXAMINATION: Primary | ICD-10-CM

## 2021-05-05 DIAGNOSIS — E03.9 HYPOTHYROIDISM, UNSPECIFIED TYPE: ICD-10-CM

## 2021-05-05 LAB
ALBUMIN SERPL-MCNC: 3.4 G/DL (ref 3.4–5)
ALP SERPL-CCNC: 65 U/L (ref 40–150)
ALT SERPL W P-5'-P-CCNC: 22 U/L (ref 0–50)
ANION GAP SERPL CALCULATED.3IONS-SCNC: 3 MMOL/L (ref 3–14)
AST SERPL W P-5'-P-CCNC: 21 U/L (ref 0–45)
BILIRUB SERPL-MCNC: 0.2 MG/DL (ref 0.2–1.3)
BUN SERPL-MCNC: 11 MG/DL (ref 7–30)
CALCIUM SERPL-MCNC: 8.8 MG/DL (ref 8.5–10.1)
CHLORIDE SERPL-SCNC: 106 MMOL/L (ref 94–109)
CO2 SERPL-SCNC: 30 MMOL/L (ref 20–32)
CREAT SERPL-MCNC: 0.89 MG/DL (ref 0.52–1.04)
ERYTHROCYTE [DISTWIDTH] IN BLOOD BY AUTOMATED COUNT: 14.9 % (ref 10–15)
GFR SERPL CREATININE-BSD FRML MDRD: 83 ML/MIN/{1.73_M2}
GLUCOSE SERPL-MCNC: 81 MG/DL (ref 70–99)
HCT VFR BLD AUTO: 39.1 % (ref 35–47)
HGB BLD-MCNC: 12.5 G/DL (ref 11.7–15.7)
MCH RBC QN AUTO: 28 PG (ref 26.5–33)
MCHC RBC AUTO-ENTMCNC: 32 G/DL (ref 31.5–36.5)
MCV RBC AUTO: 88 FL (ref 78–100)
PLATELET # BLD AUTO: 216 10E9/L (ref 150–450)
POTASSIUM SERPL-SCNC: 4.1 MMOL/L (ref 3.4–5.3)
PROT SERPL-MCNC: 6.3 G/DL (ref 6.8–8.8)
RBC # BLD AUTO: 4.46 10E12/L (ref 3.8–5.2)
SODIUM SERPL-SCNC: 139 MMOL/L (ref 133–144)
WBC # BLD AUTO: 5 10E9/L (ref 4–11)

## 2021-05-05 PROCEDURE — 99214 OFFICE O/P EST MOD 30 MIN: CPT | Performed by: NURSE PRACTITIONER

## 2021-05-05 PROCEDURE — 85027 COMPLETE CBC AUTOMATED: CPT | Performed by: NURSE PRACTITIONER

## 2021-05-05 PROCEDURE — 36415 COLL VENOUS BLD VENIPUNCTURE: CPT | Performed by: NURSE PRACTITIONER

## 2021-05-05 PROCEDURE — 80053 COMPREHEN METABOLIC PANEL: CPT | Performed by: NURSE PRACTITIONER

## 2021-05-05 NOTE — PROGRESS NOTES
[facility]  [address]  [address]  [place] [address]  Phone: [phone]  Fax: [phone]  Primary Provider: Cortney Silveira  Pre-op Performing Provider: AMALIA HEWITT      PREOPERATIVE EVALUATION:  Today's date: 5/5/2021    Eloina Aj is a 36 year old female who presents for a preoperative evaluation.    Surgical Information:  Surgery/Procedure: Cyst removal of spleen   Surgery Location: [facility]   Surgeon: Dr. Lewis Soni   Surgery Date: 05/12/2021  Time of Surgery: NA   Where patient plans to recover: At home with family  Fax number for surgical facility: Note does not need to be faxed, will be available electronically in Epic.    Type of Anesthesia Anticipated: General    Assessment & Plan     The proposed surgical procedure is considered INTERMEDIATE risk.    Preoperative examination  Cyst of spleen  - CBC with platelets: Normal  - Comprehensive metabolic panel (BMP + Alb, Alk Phos, ALT, AST, Total. Bili, TP): Low protein (6.3), results otherwise normal    Body mass index (BMI) of 40.0-44.9 in adult (H)  Body mass index is 44.46 kg/m .    Hypothyroidism, unspecified type  Controlled on levothyroxine.     History of gestational diabetes  Blood sugar within normal limits.       Risks and Recommendations:  The patient has the following additional risks and recommendations for perioperative complications:   - Morbid obesity (BMI >40)    Medication Instructions:  Patient is to take all scheduled medications on the day of surgery EXCEPT for modifications listed below:   - metformin: HOLD day of surgery.    RECOMMENDATION:  APPROVAL GIVEN to proceed with proposed procedure, without further diagnostic evaluation.      25 minutes spent on the date of the encounter doing chart review, review of test results, interpretation of tests, patient visit and documentation         Subjective     HPI related to upcoming procedure: Removal of splenic  cyst    Preop Questions 5/4/2021   1. Have you ever had a heart attack or stroke? No   2. Have you ever had surgery on your heart or blood vessels, such as a stent placement, a coronary artery bypass, or surgery on an artery in your head, neck, heart, or legs? No   3. Do you have chest pain with activity? No   4. Do you have a history of  heart failure? No   5. Do you currently have a cold, bronchitis or symptoms of other infection? No   6. Do you have a cough, shortness of breath, or wheezing? No   7. Do you or anyone in your family have previous history of blood clots? No   8. Do you or does anyone in your family have a serious bleeding problem such as prolonged bleeding following surgeries or cuts? No   9. Have you ever had problems with anemia or been told to take iron pills? No   10. Have you had any abnormal blood loss such as black, tarry or bloody stools, or abnormal vaginal bleeding? No   11. Have you ever had a blood transfusion? No   12. Are you willing to have a blood transfusion if it is medically needed before, during, or after your surgery? Yes   13. Have you or any of your relatives ever had problems with anesthesia? No   14. Do you have sleep apnea, excessive snoring or daytime drowsiness? No   15. Do you have any artifical heart valves or other implanted medical devices like a pacemaker, defibrillator, or continuous glucose monitor? No   16. Do you have artificial joints? No   17. Are you allergic to latex? No   18. Is there any chance that you may be pregnant? No       Health Care Directive:  Patient does not have a Health Care Directive or Living Will: Discussed advance care planning with patient; however, patient declined at this time.    Preoperative Review of :   reviewed - no record of controlled substances prescribed.      Patient Active Problem List   Diagnosis     Major depressive disorder, recurrent episode, moderate (H)     Anxiety     Hypothyroidism, unspecified type     Obesity  (H)     PCOS (polycystic ovarian syndrome)     Fibromyalgia     History of gestational diabetes     Altered bowel function     Splenomegaly     Cyst of spleen       Past Medical History:   Diagnosis Date     Anorexia     From age [age].     Chronic depressive personality disorder     hx of anxiety and depression     Depressive disorder      PCOS (polycystic ovarian syndrome)      Thyroid disease      Tympanosclerosis, unspecified as to involvement      Past Surgical History:   Procedure Laterality Date     APPENDECTOMY       BIOPSY  10/4/2019    Biopsy of cervix.      SECTION        SECTION N/A 3/29/2018    Procedure:  SECTION;  Repeat  Section;  Surgeon: Fern Nicolas MD;  Location: [facility]     EYE SURGERY Bilateral     muscle surgery for wandering eyes     IR SUBDIAPHRAGM ABSCESS DRAIN  2021     ORTHOPEDIC SURGERY Right     major reconstruction right shoulder     TYMPANOPLASTY Left      Family History   Problem Relation Age of Onset     Arthritis Mother         RA     Cancer Mother         bladder Ca     Diverticulitis Mother      Ulcerative Colitis Mother      Coronary Artery Disease Mother      Other Cancer Mother         Bladder cancer     Depression Mother      Osteoporosis Mother      Thyroid Disease Mother      Obesity Mother      Diabetes Father         b. 1947. Type II     Cardiovascular Father         MI. ASCD     Depression Father      Coronary Artery Disease Father      Hyperlipidemia Father      Obesity Father      Diabetes Maternal Grandmother      Anxiety Disorder Brother      Breast Cancer No family hx of      Colon Cancer No family hx of      Social History     Socioeconomic History     Marital status:      Spouse name: Not on file     Number of children: Not on file     Years of education: Not on file     Highest education level: Not on file   Occupational History     Not on file   Social Needs     Financial resource strain: Not on file      Food insecurity     Worry: Not on file     Inability: Not on file     Transportation needs     Medical: Not on file     Non-medical: Not on file   Tobacco Use     Smoking status: Never Smoker     Smokeless tobacco: Never Used   Substance and Sexual Activity     Alcohol use: No     Drug use: No     Sexual activity: Yes     Partners: Male     Birth control/protection: Pill   Lifestyle     Physical activity     Days per week: Not on file     Minutes per session: Not on file     Stress: Not on file   Relationships     Social connections     Talks on phone: Not on file     Gets together: Not on file     Attends [employer] service: Not on file     Active member of club or organization: Not on file     Attends meetings of clubs or organizations: Not on file     Relationship status: Not on file     Intimate partner violence     Fear of current or ex partner: Not on file     Emotionally abused: Not on file     Physically abused: Not on file     Forced sexual activity: Not on file   Other Topics Concern     Parent/sibling w/ CABG, MI or angioplasty before 65F 55M? No   Social History Narrative     Not on file     Current Outpatient Medications   Medication     buPROPion (WELLBUTRIN SR) 150 MG 12 hr tablet     escitalopram (LEXAPRO) 20 MG tablet     JUNEL FE 1/20 1-20 MG-MCG per tablet     levothyroxine (SYNTHROID/LEVOTHROID) 75 MCG tablet     metFORMIN (GLUCOPHAGE) 500 MG tablet     triamcinolone (KENALOG) 0.1 % external cream     Vitamin D, Cholecalciferol, 25 MCG (1000 UT) TABS     No current facility-administered medications for this visit.         Allergies   Allergen Reactions     Atarax [Hydroxyzine] Palpitations     Sulfa Drugs Swelling and Rash         Review of Systems  CONSTITUTIONAL: NEGATIVE for fever, chills, change in weight  INTEGUMENTARY/SKIN: NEGATIVE for worrisome rashes, moles or lesions  EYES: NEGATIVE for vision changes or irritation  ENT/MOUTH: NEGATIVE for ear, mouth and throat problems  RESP:  NEGATIVE for significant cough or SOB  BREAST: NEGATIVE for masses, tenderness or discharge  CV: NEGATIVE for chest pain, palpitations or peripheral edema  GI: NEGATIVE for nausea, abdominal pain, heartburn, or change in bowel habits  : NEGATIVE for frequency, dysuria, or hematuria  MUSCULOSKELETAL: NEGATIVE for significant arthralgias or myalgia  NEURO: NEGATIVE for weakness, dizziness or paresthesias  ENDOCRINE: NEGATIVE for temperature intolerance, skin/hair changes  HEME: NEGATIVE for bleeding problems  PSYCHIATRIC: NEGATIVE for changes in mood or affect        Objective     /70 (BP Location: Right arm, Patient Position: Sitting, Cuff Size: Adult Large)   Pulse 65   Temp 97.6  F (36.4  C) (Tympanic)   Resp 18   Wt 121.2 kg (267 lb 3.2 oz)   LMP 05/01/2021   SpO2 100%   BMI 44.46 kg/m      Physical Exam  Constitutional: appears to be in no acute distress, comfortable, pleasant.   Eyes: anicteric, conjunctiva clear without drainage or erythema. AUBREY.   Ears, Nose and Throat: tympanic membranes gray with LR,  nose without nasal discharge. OP: no erythema to posterior pharynx, negative post nasal drainage, tonsils +1 no erythema or exudate.  Neck: supple, thyroid palpable,not enlarged, no nodules   Cardiovascular: regular rate and rhythm, normal S1 and S2, no murmurs, rubs or gallops, peripheral pulses full and symmetric; negative peripheral edema   Respiratory: Air entry throughout. Breathing pattern unlabored without the use of accessory muscles. Clear to auscultation A and P, no wheezes or crackles, normal breath sounds.    Gastrointestinal: rounded, soft. Positive bowel sounds x4, nontender, no masses.   Musculoskeletal: full range of motion, no edema.   Skin: pink, turgor smooth and elastic. Negative for lesions or dryness.  Neurological: normal gait, no tremor.   Psychological: appropriate mood and affect.   Lymphatic: no cervical, axillary, supraclavicular, or infraclavicular  lymphadenopathy.      Diagnostics:    Last Comprehensive Metabolic Panel:  Sodium   Date Value Ref Range Status   05/05/2021 139 133 - 144 mmol/L Final     Potassium   Date Value Ref Range Status   05/05/2021 4.1 3.4 - 5.3 mmol/L Final     Chloride   Date Value Ref Range Status   05/05/2021 106 94 - 109 mmol/L Final     Carbon Dioxide   Date Value Ref Range Status   05/05/2021 30 20 - 32 mmol/L Final     Anion Gap   Date Value Ref Range Status   05/05/2021 3 3 - 14 mmol/L Final     Glucose   Date Value Ref Range Status   05/05/2021 81 70 - 99 mg/dL Final     Urea Nitrogen   Date Value Ref Range Status   05/05/2021 11 7 - 30 mg/dL Final     Creatinine   Date Value Ref Range Status   05/05/2021 0.89 0.52 - 1.04 mg/dL Final     GFR Estimate   Date Value Ref Range Status   05/05/2021 83 >60 mL/min/[1.73_m2] Final     Comment:     Non  GFR Calc  Starting 12/18/2018, serum creatinine based estimated GFR (eGFR) will be   calculated using the Chronic Kidney Disease Epidemiology Collaboration   (CKD-EPI) equation.       Calcium   Date Value Ref Range Status   05/05/2021 8.8 8.5 - 10.1 mg/dL Final     Bilirubin Total   Date Value Ref Range Status   05/05/2021 0.2 0.2 - 1.3 mg/dL Final     Alkaline Phosphatase   Date Value Ref Range Status   05/05/2021 65 40 - 150 U/L Final     ALT   Date Value Ref Range Status   05/05/2021 22 0 - 50 U/L Final     AST   Date Value Ref Range Status   05/05/2021 21 0 - 45 U/L Final       Lab Results   Component Value Date    WBC 5.0 05/05/2021     Lab Results   Component Value Date    RBC 4.46 05/05/2021     Lab Results   Component Value Date    HGB 12.5 05/05/2021     Lab Results   Component Value Date    HCT 39.1 05/05/2021     Lab Results   Component Value Date    MCV 88 05/05/2021     Lab Results   Component Value Date    MCH 28.0 05/05/2021     Lab Results   Component Value Date    MCHC 32.0 05/05/2021     Lab Results   Component Value Date    RDW 14.9 05/05/2021     Lab  Results   Component Value Date     05/05/2021       No EKG required, no history of coronary heart disease, significant arrhythmia, peripheral arterial disease or other structural heart disease.    Revised Cardiac Risk Index (RCRI):  The patient has the following serious cardiovascular risks for perioperative complications:   - No serious cardiac risks = 0 points     RCRI Interpretation: 0 points: Class I (very low risk - 0.4% complication rate)           Signed Electronically by: NATHANAEL Javier CNP  Copy of this evaluation report is provided to requesting physician.

## 2021-05-05 NOTE — RESULT ENCOUNTER NOTE
Dear Eloina,     It was nice meeting you today!    I am writing to let you know that your labs came back normal. Metabolic panel showed normal liver and kidney function. CBC came back without signs of anemia or infection.    Please call the clinic if you have any questions regarding these results or the plan of care.    Gretta Gunter, MOLINA, APRN, CNP

## 2021-05-05 NOTE — H&P (VIEW-ONLY)
[facility]  [address]  [address]  [place] [address]  Phone: [phone]  Fax: [phone]  Primary Provider: Cortney Silveira  Pre-op Performing Provider: AMALIA HEWITT      PREOPERATIVE EVALUATION:  Today's date: 5/5/2021    Eloina Aj is a 36 year old female who presents for a preoperative evaluation.    Surgical Information:  Surgery/Procedure: Cyst removal of spleen   Surgery Location: [facility]   Surgeon: Dr. Lewis Soni   Surgery Date: 05/12/2021  Time of Surgery: NA   Where patient plans to recover: At home with family  Fax number for surgical facility: Note does not need to be faxed, will be available electronically in Epic.    Type of Anesthesia Anticipated: General    Assessment & Plan     The proposed surgical procedure is considered INTERMEDIATE risk.    Preoperative examination  Cyst of spleen  - CBC with platelets: Normal  - Comprehensive metabolic panel (BMP + Alb, Alk Phos, ALT, AST, Total. Bili, TP): Low protein (6.3), results otherwise normal    Body mass index (BMI) of 40.0-44.9 in adult (H)  Body mass index is 44.46 kg/m .    Hypothyroidism, unspecified type  Controlled on levothyroxine.     History of gestational diabetes  Blood sugar within normal limits.       Risks and Recommendations:  The patient has the following additional risks and recommendations for perioperative complications:   - Morbid obesity (BMI >40)    Medication Instructions:  Patient is to take all scheduled medications on the day of surgery EXCEPT for modifications listed below:   - metformin: HOLD day of surgery.    RECOMMENDATION:  APPROVAL GIVEN to proceed with proposed procedure, without further diagnostic evaluation.      25 minutes spent on the date of the encounter doing chart review, review of test results, interpretation of tests, patient visit and documentation         Subjective     HPI related to upcoming procedure: Removal of splenic  cyst    Preop Questions 5/4/2021   1. Have you ever had a heart attack or stroke? No   2. Have you ever had surgery on your heart or blood vessels, such as a stent placement, a coronary artery bypass, or surgery on an artery in your head, neck, heart, or legs? No   3. Do you have chest pain with activity? No   4. Do you have a history of  heart failure? No   5. Do you currently have a cold, bronchitis or symptoms of other infection? No   6. Do you have a cough, shortness of breath, or wheezing? No   7. Do you or anyone in your family have previous history of blood clots? No   8. Do you or does anyone in your family have a serious bleeding problem such as prolonged bleeding following surgeries or cuts? No   9. Have you ever had problems with anemia or been told to take iron pills? No   10. Have you had any abnormal blood loss such as black, tarry or bloody stools, or abnormal vaginal bleeding? No   11. Have you ever had a blood transfusion? No   12. Are you willing to have a blood transfusion if it is medically needed before, during, or after your surgery? Yes   13. Have you or any of your relatives ever had problems with anesthesia? No   14. Do you have sleep apnea, excessive snoring or daytime drowsiness? No   15. Do you have any artifical heart valves or other implanted medical devices like a pacemaker, defibrillator, or continuous glucose monitor? No   16. Do you have artificial joints? No   17. Are you allergic to latex? No   18. Is there any chance that you may be pregnant? No       Health Care Directive:  Patient does not have a Health Care Directive or Living Will: Discussed advance care planning with patient; however, patient declined at this time.    Preoperative Review of :   reviewed - no record of controlled substances prescribed.      Patient Active Problem List   Diagnosis     Major depressive disorder, recurrent episode, moderate (H)     Anxiety     Hypothyroidism, unspecified type     Obesity  (H)     PCOS (polycystic ovarian syndrome)     Fibromyalgia     History of gestational diabetes     Altered bowel function     Splenomegaly     Cyst of spleen       Past Medical History:   Diagnosis Date     Anorexia     From age [age].     Chronic depressive personality disorder     hx of anxiety and depression     Depressive disorder      PCOS (polycystic ovarian syndrome)      Thyroid disease      Tympanosclerosis, unspecified as to involvement      Past Surgical History:   Procedure Laterality Date     APPENDECTOMY       BIOPSY  10/4/2019    Biopsy of cervix.      SECTION        SECTION N/A 3/29/2018    Procedure:  SECTION;  Repeat  Section;  Surgeon: Fern Nicolas MD;  Location: [facility]     EYE SURGERY Bilateral     muscle surgery for wandering eyes     IR SUBDIAPHRAGM ABSCESS DRAIN  2021     ORTHOPEDIC SURGERY Right     major reconstruction right shoulder     TYMPANOPLASTY Left      Family History   Problem Relation Age of Onset     Arthritis Mother         RA     Cancer Mother         bladder Ca     Diverticulitis Mother      Ulcerative Colitis Mother      Coronary Artery Disease Mother      Other Cancer Mother         Bladder cancer     Depression Mother      Osteoporosis Mother      Thyroid Disease Mother      Obesity Mother      Diabetes Father         b. 1947. Type II     Cardiovascular Father         MI. ASCD     Depression Father      Coronary Artery Disease Father      Hyperlipidemia Father      Obesity Father      Diabetes Maternal Grandmother      Anxiety Disorder Brother      Breast Cancer No family hx of      Colon Cancer No family hx of      Social History     Socioeconomic History     Marital status:      Spouse name: Not on file     Number of children: Not on file     Years of education: Not on file     Highest education level: Not on file   Occupational History     Not on file   Social Needs     Financial resource strain: Not on file      Food insecurity     Worry: Not on file     Inability: Not on file     Transportation needs     Medical: Not on file     Non-medical: Not on file   Tobacco Use     Smoking status: Never Smoker     Smokeless tobacco: Never Used   Substance and Sexual Activity     Alcohol use: No     Drug use: No     Sexual activity: Yes     Partners: Male     Birth control/protection: Pill   Lifestyle     Physical activity     Days per week: Not on file     Minutes per session: Not on file     Stress: Not on file   Relationships     Social connections     Talks on phone: Not on file     Gets together: Not on file     Attends Jew service: Not on file     Active member of club or organization: Not on file     Attends meetings of clubs or organizations: Not on file     Relationship status: Not on file     Intimate partner violence     Fear of current or ex partner: Not on file     Emotionally abused: Not on file     Physically abused: Not on file     Forced sexual activity: Not on file   Other Topics Concern     Parent/sibling w/ CABG, MI or angioplasty before 65F 55M? No   Social History Narrative     Not on file     Current Outpatient Medications   Medication     buPROPion (WELLBUTRIN SR) 150 MG 12 hr tablet     escitalopram (LEXAPRO) 20 MG tablet     JUNEL FE 1/20 1-20 MG-MCG per tablet     levothyroxine (SYNTHROID/LEVOTHROID) 75 MCG tablet     metFORMIN (GLUCOPHAGE) 500 MG tablet     triamcinolone (KENALOG) 0.1 % external cream     Vitamin D, Cholecalciferol, 25 MCG (1000 UT) TABS     No current facility-administered medications for this visit.         Allergies   Allergen Reactions     Atarax [Hydroxyzine] Palpitations     Sulfa Drugs Swelling and Rash         Review of Systems  CONSTITUTIONAL: NEGATIVE for fever, chills, change in weight  INTEGUMENTARY/SKIN: NEGATIVE for worrisome rashes, moles or lesions  EYES: NEGATIVE for vision changes or irritation  ENT/MOUTH: NEGATIVE for ear, mouth and throat problems  RESP:  NEGATIVE for significant cough or SOB  BREAST: NEGATIVE for masses, tenderness or discharge  CV: NEGATIVE for chest pain, palpitations or peripheral edema  GI: NEGATIVE for nausea, abdominal pain, heartburn, or change in bowel habits  : NEGATIVE for frequency, dysuria, or hematuria  MUSCULOSKELETAL: NEGATIVE for significant arthralgias or myalgia  NEURO: NEGATIVE for weakness, dizziness or paresthesias  ENDOCRINE: NEGATIVE for temperature intolerance, skin/hair changes  HEME: NEGATIVE for bleeding problems  PSYCHIATRIC: NEGATIVE for changes in mood or affect        Objective     /70 (BP Location: Right arm, Patient Position: Sitting, Cuff Size: Adult Large)   Pulse 65   Temp 97.6  F (36.4  C) (Tympanic)   Resp 18   Wt 121.2 kg (267 lb 3.2 oz)   LMP 05/01/2021   SpO2 100%   BMI 44.46 kg/m      Physical Exam  Constitutional: appears to be in no acute distress, comfortable, pleasant.   Eyes: anicteric, conjunctiva clear without drainage or erythema. AUBREY.   Ears, Nose and Throat: tympanic membranes gray with LR,  nose without nasal discharge. OP: no erythema to posterior pharynx, negative post nasal drainage, tonsils +1 no erythema or exudate.  Neck: supple, thyroid palpable,not enlarged, no nodules   Cardiovascular: regular rate and rhythm, normal S1 and S2, no murmurs, rubs or gallops, peripheral pulses full and symmetric; negative peripheral edema   Respiratory: Air entry throughout. Breathing pattern unlabored without the use of accessory muscles. Clear to auscultation A and P, no wheezes or crackles, normal breath sounds.    Gastrointestinal: rounded, soft. Positive bowel sounds x4, nontender, no masses.   Musculoskeletal: full range of motion, no edema.   Skin: pink, turgor smooth and elastic. Negative for lesions or dryness.  Neurological: normal gait, no tremor.   Psychological: appropriate mood and affect.   Lymphatic: no cervical, axillary, supraclavicular, or infraclavicular  lymphadenopathy.      Diagnostics:    Last Comprehensive Metabolic Panel:  Sodium   Date Value Ref Range Status   05/05/2021 139 133 - 144 mmol/L Final     Potassium   Date Value Ref Range Status   05/05/2021 4.1 3.4 - 5.3 mmol/L Final     Chloride   Date Value Ref Range Status   05/05/2021 106 94 - 109 mmol/L Final     Carbon Dioxide   Date Value Ref Range Status   05/05/2021 30 20 - 32 mmol/L Final     Anion Gap   Date Value Ref Range Status   05/05/2021 3 3 - 14 mmol/L Final     Glucose   Date Value Ref Range Status   05/05/2021 81 70 - 99 mg/dL Final     Urea Nitrogen   Date Value Ref Range Status   05/05/2021 11 7 - 30 mg/dL Final     Creatinine   Date Value Ref Range Status   05/05/2021 0.89 0.52 - 1.04 mg/dL Final     GFR Estimate   Date Value Ref Range Status   05/05/2021 83 >60 mL/min/[1.73_m2] Final     Comment:     Non  GFR Calc  Starting 12/18/2018, serum creatinine based estimated GFR (eGFR) will be   calculated using the Chronic Kidney Disease Epidemiology Collaboration   (CKD-EPI) equation.       Calcium   Date Value Ref Range Status   05/05/2021 8.8 8.5 - 10.1 mg/dL Final     Bilirubin Total   Date Value Ref Range Status   05/05/2021 0.2 0.2 - 1.3 mg/dL Final     Alkaline Phosphatase   Date Value Ref Range Status   05/05/2021 65 40 - 150 U/L Final     ALT   Date Value Ref Range Status   05/05/2021 22 0 - 50 U/L Final     AST   Date Value Ref Range Status   05/05/2021 21 0 - 45 U/L Final       Lab Results   Component Value Date    WBC 5.0 05/05/2021     Lab Results   Component Value Date    RBC 4.46 05/05/2021     Lab Results   Component Value Date    HGB 12.5 05/05/2021     Lab Results   Component Value Date    HCT 39.1 05/05/2021     Lab Results   Component Value Date    MCV 88 05/05/2021     Lab Results   Component Value Date    MCH 28.0 05/05/2021     Lab Results   Component Value Date    MCHC 32.0 05/05/2021     Lab Results   Component Value Date    RDW 14.9 05/05/2021     Lab  Results   Component Value Date     05/05/2021       No EKG required, no history of coronary heart disease, significant arrhythmia, peripheral arterial disease or other structural heart disease.    Revised Cardiac Risk Index (RCRI):  The patient has the following serious cardiovascular risks for perioperative complications:   - No serious cardiac risks = 0 points     RCRI Interpretation: 0 points: Class I (very low risk - 0.4% complication rate)           Signed Electronically by: NATHANAEL Javier CNP  Copy of this evaluation report is provided to requesting physician.

## 2021-05-08 DIAGNOSIS — Z11.59 ENCOUNTER FOR SCREENING FOR OTHER VIRAL DISEASES: ICD-10-CM

## 2021-05-08 PROCEDURE — U0003 INFECTIOUS AGENT DETECTION BY NUCLEIC ACID (DNA OR RNA); SEVERE ACUTE RESPIRATORY SYNDROME CORONAVIRUS 2 (SARS-COV-2) (CORONAVIRUS DISEASE [COVID-19]), AMPLIFIED PROBE TECHNIQUE, MAKING USE OF HIGH THROUGHPUT TECHNOLOGIES AS DESCRIBED BY CMS-2020-01-R: HCPCS | Performed by: SURGERY

## 2021-05-08 PROCEDURE — U0005 INFEC AGEN DETEC AMPLI PROBE: HCPCS | Performed by: SURGERY

## 2021-05-11 NOTE — PHARMACY-ADMISSION MEDICATION HISTORY
Medication reconciliation interview completed by pre-admitting nurse Tia Marshall, reviewed by pharmacy. No further clarifications needed.       Prior to Admission medications    Medication Sig Last Dose Taking? Auth Provider   buPROPion (WELLBUTRIN SR) 150 MG 12 hr tablet Take 1 tablet (150 mg) by mouth 2 times daily   Cortney Silveira MD   escitalopram (LEXAPRO) 20 MG tablet TAKE 1 AND 1/2 TABLETS BY MOUTH DAILY   Cortney Silveira MD   JUNEL FE 1/20 1-20 MG-MCG per tablet Take 1 tablet by mouth daily   Reported, Patient   levothyroxine (SYNTHROID/LEVOTHROID) 75 MCG tablet Take 1 tablet (75 mcg) by mouth daily   Cortney Silveira MD   metFORMIN (GLUCOPHAGE) 500 MG tablet Take 1 tablet (500 mg) by mouth 2 times daily (with meals) Office visit needed prior to additional refills.   Cortney Silveira MD   triamcinolone (KENALOG) 0.1 % external cream Apply topically 3 times daily as needed (psoriasis)   Serum, Poonam Angeles MD   Vitamin D, Cholecalciferol, 25 MCG (1000 UT) TABS Take 1 tablet by mouth daily   Reported, Patient

## 2021-05-12 ENCOUNTER — APPOINTMENT (OUTPATIENT)
Dept: GENERAL RADIOLOGY | Facility: CLINIC | Age: 36
End: 2021-05-12
Attending: SURGERY
Payer: COMMERCIAL

## 2021-05-12 ENCOUNTER — HOSPITAL ENCOUNTER (OUTPATIENT)
Facility: CLINIC | Age: 36
LOS: 1 days | Discharge: HOME | End: 2021-05-13
Attending: SURGERY | Admitting: SURGERY
Payer: COMMERCIAL

## 2021-05-12 ENCOUNTER — ANESTHESIA (OUTPATIENT)
Dept: SURGERY | Facility: CLINIC | Age: 36
End: 2021-05-12
Payer: COMMERCIAL

## 2021-05-12 ENCOUNTER — APPOINTMENT (OUTPATIENT)
Dept: SURGERY | Facility: PHYSICIAN GROUP | Age: 36
End: 2021-05-12
Payer: COMMERCIAL

## 2021-05-12 ENCOUNTER — SURGERY (OUTPATIENT)
Age: 36
End: 2021-05-12
Payer: COMMERCIAL

## 2021-05-12 ENCOUNTER — ANESTHESIA EVENT (OUTPATIENT)
Dept: SURGERY | Facility: CLINIC | Age: 36
End: 2021-05-12
Payer: COMMERCIAL

## 2021-05-12 DIAGNOSIS — D73.4 CYST OF SPLEEN: ICD-10-CM

## 2021-05-12 LAB — HCG UR QL: NEGATIVE

## 2021-05-12 PROCEDURE — 710N000009 HC RECOVERY PHASE 1, LEVEL 1, PER MIN: Performed by: SURGERY

## 2021-05-12 PROCEDURE — 38129 UNLISTED LAPS PX SPLEEN: CPT | Mod: AS | Performed by: PHYSICIAN ASSISTANT

## 2021-05-12 PROCEDURE — 88305 TISSUE EXAM BY PATHOLOGIST: CPT | Mod: 26 | Performed by: PATHOLOGY

## 2021-05-12 PROCEDURE — 81025 URINE PREGNANCY TEST: CPT | Performed by: ANESTHESIOLOGY

## 2021-05-12 PROCEDURE — 250N000009 HC RX 250: Performed by: NURSE ANESTHETIST, CERTIFIED REGISTERED

## 2021-05-12 PROCEDURE — 250N000011 HC RX IP 250 OP 636: Performed by: SURGERY

## 2021-05-12 PROCEDURE — 258N000001 HC RX 258: Performed by: SURGERY

## 2021-05-12 PROCEDURE — 999N000063 XR ABDOMEN PORT 1 VIEWS

## 2021-05-12 PROCEDURE — 258N000003 HC RX IP 258 OP 636: Performed by: NURSE ANESTHETIST, CERTIFIED REGISTERED

## 2021-05-12 PROCEDURE — 370N000017 HC ANESTHESIA TECHNICAL FEE, PER MIN: Performed by: SURGERY

## 2021-05-12 PROCEDURE — 250N000011 HC RX IP 250 OP 636: Performed by: ANESTHESIOLOGY

## 2021-05-12 PROCEDURE — 272N000001 HC OR GENERAL SUPPLY STERILE: Performed by: SURGERY

## 2021-05-12 PROCEDURE — 250N000013 HC RX MED GY IP 250 OP 250 PS 637: Performed by: SURGERY

## 2021-05-12 PROCEDURE — 250N000011 HC RX IP 250 OP 636: Performed by: NURSE ANESTHETIST, CERTIFIED REGISTERED

## 2021-05-12 PROCEDURE — 360N000077 HC SURGERY LEVEL 4, PER MIN: Performed by: SURGERY

## 2021-05-12 PROCEDURE — 250N000009 HC RX 250: Performed by: SURGERY

## 2021-05-12 PROCEDURE — 258N000003 HC RX IP 258 OP 636: Performed by: SURGERY

## 2021-05-12 PROCEDURE — 999N000141 HC STATISTIC PRE-PROCEDURE NURSING ASSESSMENT: Performed by: SURGERY

## 2021-05-12 PROCEDURE — 258N000003 HC RX IP 258 OP 636: Performed by: ANESTHESIOLOGY

## 2021-05-12 PROCEDURE — 88305 TISSUE EXAM BY PATHOLOGIST: CPT | Mod: TC | Performed by: SURGERY

## 2021-05-12 PROCEDURE — C1765 ADHESION BARRIER: HCPCS | Performed by: SURGERY

## 2021-05-12 PROCEDURE — 38129 UNLISTED LAPS PX SPLEEN: CPT | Performed by: SURGERY

## 2021-05-12 RX ORDER — METOCLOPRAMIDE HYDROCHLORIDE 5 MG/ML
10 INJECTION INTRAMUSCULAR; INTRAVENOUS EVERY 6 HOURS PRN
Status: DISCONTINUED | OUTPATIENT
Start: 2021-05-12 | End: 2021-05-12 | Stop reason: HOSPADM

## 2021-05-12 RX ORDER — CEFAZOLIN SODIUM IN 0.9 % NACL 3 G/100 ML
3 INTRAVENOUS SOLUTION, PIGGYBACK (ML) INTRAVENOUS
Status: COMPLETED | OUTPATIENT
Start: 2021-05-12 | End: 2021-05-12

## 2021-05-12 RX ORDER — SODIUM CHLORIDE, SODIUM LACTATE, POTASSIUM CHLORIDE, CALCIUM CHLORIDE 600; 310; 30; 20 MG/100ML; MG/100ML; MG/100ML; MG/100ML
INJECTION, SOLUTION INTRAVENOUS CONTINUOUS
Status: DISCONTINUED | OUTPATIENT
Start: 2021-05-12 | End: 2021-05-12 | Stop reason: HOSPADM

## 2021-05-12 RX ORDER — PROPOFOL 10 MG/ML
INJECTION, EMULSION INTRAVENOUS CONTINUOUS PRN
Status: DISCONTINUED | OUTPATIENT
Start: 2021-05-12 | End: 2021-05-12

## 2021-05-12 RX ORDER — DEXAMETHASONE SODIUM PHOSPHATE 4 MG/ML
INJECTION, SOLUTION INTRA-ARTICULAR; INTRALESIONAL; INTRAMUSCULAR; INTRAVENOUS; SOFT TISSUE PRN
Status: DISCONTINUED | OUTPATIENT
Start: 2021-05-12 | End: 2021-05-12

## 2021-05-12 RX ORDER — METOPROLOL TARTRATE 1 MG/ML
1-2 INJECTION, SOLUTION INTRAVENOUS EVERY 5 MIN PRN
Status: DISCONTINUED | OUTPATIENT
Start: 2021-05-12 | End: 2021-05-12 | Stop reason: HOSPADM

## 2021-05-12 RX ORDER — TRIAMCINOLONE ACETONIDE 1 MG/G
CREAM TOPICAL 3 TIMES DAILY PRN
Status: DISCONTINUED | OUTPATIENT
Start: 2021-05-12 | End: 2021-05-13 | Stop reason: HOSPADM

## 2021-05-12 RX ORDER — ACETAMINOPHEN 325 MG/1
650 TABLET ORAL EVERY 6 HOURS PRN
Status: DISCONTINUED | OUTPATIENT
Start: 2021-05-12 | End: 2021-05-13 | Stop reason: HOSPADM

## 2021-05-12 RX ORDER — HYDROMORPHONE HYDROCHLORIDE 1 MG/ML
0.3 INJECTION, SOLUTION INTRAMUSCULAR; INTRAVENOUS; SUBCUTANEOUS
Status: DISCONTINUED | OUTPATIENT
Start: 2021-05-12 | End: 2021-05-13 | Stop reason: HOSPADM

## 2021-05-12 RX ORDER — ESCITALOPRAM OXALATE 10 MG/1
30 TABLET ORAL DAILY
Status: DISCONTINUED | OUTPATIENT
Start: 2021-05-12 | End: 2021-05-13 | Stop reason: HOSPADM

## 2021-05-12 RX ORDER — ONDANSETRON 2 MG/ML
4 INJECTION INTRAMUSCULAR; INTRAVENOUS EVERY 30 MIN PRN
Status: DISCONTINUED | OUTPATIENT
Start: 2021-05-12 | End: 2021-05-12 | Stop reason: HOSPADM

## 2021-05-12 RX ORDER — NALOXONE HYDROCHLORIDE 0.4 MG/ML
0.4 INJECTION, SOLUTION INTRAMUSCULAR; INTRAVENOUS; SUBCUTANEOUS
Status: DISCONTINUED | OUTPATIENT
Start: 2021-05-12 | End: 2021-05-13 | Stop reason: HOSPADM

## 2021-05-12 RX ORDER — NALOXONE HYDROCHLORIDE 0.4 MG/ML
0.2 INJECTION, SOLUTION INTRAMUSCULAR; INTRAVENOUS; SUBCUTANEOUS
Status: DISCONTINUED | OUTPATIENT
Start: 2021-05-12 | End: 2021-05-12 | Stop reason: HOSPADM

## 2021-05-12 RX ORDER — LEVOTHYROXINE SODIUM 75 UG/1
75 TABLET ORAL
Status: DISCONTINUED | OUTPATIENT
Start: 2021-05-13 | End: 2021-05-13 | Stop reason: HOSPADM

## 2021-05-12 RX ORDER — FENTANYL CITRATE 50 UG/ML
25-50 INJECTION, SOLUTION INTRAMUSCULAR; INTRAVENOUS
Status: DISCONTINUED | OUTPATIENT
Start: 2021-05-12 | End: 2021-05-12 | Stop reason: HOSPADM

## 2021-05-12 RX ORDER — ONDANSETRON 2 MG/ML
INJECTION INTRAMUSCULAR; INTRAVENOUS PRN
Status: DISCONTINUED | OUTPATIENT
Start: 2021-05-12 | End: 2021-05-12

## 2021-05-12 RX ORDER — ONDANSETRON 2 MG/ML
4 INJECTION INTRAMUSCULAR; INTRAVENOUS EVERY 6 HOURS PRN
Status: DISCONTINUED | OUTPATIENT
Start: 2021-05-12 | End: 2021-05-13 | Stop reason: HOSPADM

## 2021-05-12 RX ORDER — VITAMIN B COMPLEX
25 TABLET ORAL DAILY
Status: DISCONTINUED | OUTPATIENT
Start: 2021-05-13 | End: 2021-05-13 | Stop reason: HOSPADM

## 2021-05-12 RX ORDER — LIDOCAINE 40 MG/G
CREAM TOPICAL
Status: DISCONTINUED | OUTPATIENT
Start: 2021-05-12 | End: 2021-05-13 | Stop reason: HOSPADM

## 2021-05-12 RX ORDER — PROPOFOL 10 MG/ML
INJECTION, EMULSION INTRAVENOUS PRN
Status: DISCONTINUED | OUTPATIENT
Start: 2021-05-12 | End: 2021-05-12

## 2021-05-12 RX ORDER — KETOROLAC TROMETHAMINE 15 MG/ML
15 INJECTION, SOLUTION INTRAMUSCULAR; INTRAVENOUS EVERY 6 HOURS PRN
Status: DISCONTINUED | OUTPATIENT
Start: 2021-05-12 | End: 2021-05-13 | Stop reason: HOSPADM

## 2021-05-12 RX ORDER — LIDOCAINE HYDROCHLORIDE 10 MG/ML
INJECTION, SOLUTION INFILTRATION; PERINEURAL PRN
Status: DISCONTINUED | OUTPATIENT
Start: 2021-05-12 | End: 2021-05-12

## 2021-05-12 RX ORDER — OXYCODONE HYDROCHLORIDE 5 MG/1
5-10 TABLET ORAL
Status: DISCONTINUED | OUTPATIENT
Start: 2021-05-12 | End: 2021-05-13 | Stop reason: HOSPADM

## 2021-05-12 RX ORDER — KETOROLAC TROMETHAMINE 30 MG/ML
30 INJECTION, SOLUTION INTRAMUSCULAR; INTRAVENOUS EVERY 6 HOURS PRN
Status: DISCONTINUED | OUTPATIENT
Start: 2021-05-12 | End: 2021-05-12 | Stop reason: HOSPADM

## 2021-05-12 RX ORDER — NEOSTIGMINE METHYLSULFATE 1 MG/ML
VIAL (ML) INJECTION PRN
Status: DISCONTINUED | OUTPATIENT
Start: 2021-05-12 | End: 2021-05-12

## 2021-05-12 RX ORDER — SODIUM CHLORIDE, SODIUM LACTATE, POTASSIUM CHLORIDE, CALCIUM CHLORIDE 600; 310; 30; 20 MG/100ML; MG/100ML; MG/100ML; MG/100ML
INJECTION, SOLUTION INTRAVENOUS CONTINUOUS
Status: DISCONTINUED | OUTPATIENT
Start: 2021-05-12 | End: 2021-05-13 | Stop reason: HOSPADM

## 2021-05-12 RX ORDER — NALOXONE HYDROCHLORIDE 0.4 MG/ML
0.2 INJECTION, SOLUTION INTRAMUSCULAR; INTRAVENOUS; SUBCUTANEOUS
Status: DISCONTINUED | OUTPATIENT
Start: 2021-05-12 | End: 2021-05-13 | Stop reason: HOSPADM

## 2021-05-12 RX ORDER — NORETHINDRONE ACETATE AND ETHINYL ESTRADIOL 1MG-20(21)
1 KIT ORAL DAILY
Status: DISCONTINUED | OUTPATIENT
Start: 2021-05-13 | End: 2021-05-13 | Stop reason: HOSPADM

## 2021-05-12 RX ORDER — GLYCOPYRROLATE 0.2 MG/ML
INJECTION, SOLUTION INTRAMUSCULAR; INTRAVENOUS PRN
Status: DISCONTINUED | OUTPATIENT
Start: 2021-05-12 | End: 2021-05-12

## 2021-05-12 RX ORDER — NALOXONE HYDROCHLORIDE 0.4 MG/ML
0.4 INJECTION, SOLUTION INTRAMUSCULAR; INTRAVENOUS; SUBCUTANEOUS
Status: DISCONTINUED | OUTPATIENT
Start: 2021-05-12 | End: 2021-05-12 | Stop reason: HOSPADM

## 2021-05-12 RX ORDER — SODIUM CHLORIDE 9 MG/ML
INJECTION, SOLUTION INTRAVENOUS CONTINUOUS PRN
Status: DISCONTINUED | OUTPATIENT
Start: 2021-05-12 | End: 2021-05-12

## 2021-05-12 RX ORDER — FENTANYL CITRATE 50 UG/ML
INJECTION, SOLUTION INTRAMUSCULAR; INTRAVENOUS PRN
Status: DISCONTINUED | OUTPATIENT
Start: 2021-05-12 | End: 2021-05-12

## 2021-05-12 RX ORDER — METOCLOPRAMIDE 10 MG/1
10 TABLET ORAL EVERY 6 HOURS PRN
Status: DISCONTINUED | OUTPATIENT
Start: 2021-05-12 | End: 2021-05-12 | Stop reason: HOSPADM

## 2021-05-12 RX ORDER — LIDOCAINE 40 MG/G
CREAM TOPICAL
Status: DISCONTINUED | OUTPATIENT
Start: 2021-05-12 | End: 2021-05-12 | Stop reason: HOSPADM

## 2021-05-12 RX ORDER — BUPROPION HYDROCHLORIDE 150 MG/1
150 TABLET, EXTENDED RELEASE ORAL 2 TIMES DAILY
Status: DISCONTINUED | OUTPATIENT
Start: 2021-05-12 | End: 2021-05-13 | Stop reason: HOSPADM

## 2021-05-12 RX ORDER — MEPERIDINE HYDROCHLORIDE 25 MG/ML
12.5 INJECTION INTRAMUSCULAR; INTRAVENOUS; SUBCUTANEOUS
Status: DISCONTINUED | OUTPATIENT
Start: 2021-05-12 | End: 2021-05-12 | Stop reason: HOSPADM

## 2021-05-12 RX ORDER — DIMENHYDRINATE 50 MG/ML
25 INJECTION, SOLUTION INTRAMUSCULAR; INTRAVENOUS
Status: DISCONTINUED | OUTPATIENT
Start: 2021-05-12 | End: 2021-05-12 | Stop reason: HOSPADM

## 2021-05-12 RX ORDER — HYDRALAZINE HYDROCHLORIDE 20 MG/ML
2.5-5 INJECTION INTRAMUSCULAR; INTRAVENOUS EVERY 10 MIN PRN
Status: DISCONTINUED | OUTPATIENT
Start: 2021-05-12 | End: 2021-05-12 | Stop reason: HOSPADM

## 2021-05-12 RX ORDER — BUPIVACAINE HYDROCHLORIDE 5 MG/ML
INJECTION, SOLUTION EPIDURAL; INTRACAUDAL PRN
Status: DISCONTINUED | OUTPATIENT
Start: 2021-05-12 | End: 2021-05-12 | Stop reason: HOSPADM

## 2021-05-12 RX ORDER — ONDANSETRON 4 MG/1
4 TABLET, ORALLY DISINTEGRATING ORAL EVERY 30 MIN PRN
Status: DISCONTINUED | OUTPATIENT
Start: 2021-05-12 | End: 2021-05-12 | Stop reason: HOSPADM

## 2021-05-12 RX ORDER — CEFAZOLIN SODIUM 1 G/3ML
1 INJECTION, POWDER, FOR SOLUTION INTRAMUSCULAR; INTRAVENOUS SEE ADMIN INSTRUCTIONS
Status: DISCONTINUED | OUTPATIENT
Start: 2021-05-12 | End: 2021-05-12 | Stop reason: HOSPADM

## 2021-05-12 RX ORDER — ONDANSETRON 4 MG/1
4 TABLET, ORALLY DISINTEGRATING ORAL EVERY 6 HOURS PRN
Status: DISCONTINUED | OUTPATIENT
Start: 2021-05-12 | End: 2021-05-13 | Stop reason: HOSPADM

## 2021-05-12 RX ADMIN — GLYCOPYRROLATE 0.2 MG: 0.2 INJECTION, SOLUTION INTRAMUSCULAR; INTRAVENOUS at 07:50

## 2021-05-12 RX ADMIN — SODIUM CHLORIDE: 9 INJECTION, SOLUTION INTRAVENOUS at 07:50

## 2021-05-12 RX ADMIN — BUPROPION HYDROCHLORIDE 150 MG: 150 TABLET, EXTENDED RELEASE ORAL at 21:24

## 2021-05-12 RX ADMIN — HYDROMORPHONE HYDROCHLORIDE 0.3 MG: 1 INJECTION, SOLUTION INTRAMUSCULAR; INTRAVENOUS; SUBCUTANEOUS at 14:28

## 2021-05-12 RX ADMIN — KETOROLAC TROMETHAMINE 15 MG: 15 INJECTION, SOLUTION INTRAMUSCULAR; INTRAVENOUS at 22:36

## 2021-05-12 RX ADMIN — Medication 3 G: at 07:55

## 2021-05-12 RX ADMIN — SODIUM CHLORIDE, POTASSIUM CHLORIDE, SODIUM LACTATE AND CALCIUM CHLORIDE: 600; 310; 30; 20 INJECTION, SOLUTION INTRAVENOUS at 08:08

## 2021-05-12 RX ADMIN — SODIUM CHLORIDE 1000 ML: 900 IRRIGANT IRRIGATION at 09:47

## 2021-05-12 RX ADMIN — PROPOFOL 75 MCG/KG/MIN: 10 INJECTION, EMULSION INTRAVENOUS at 08:12

## 2021-05-12 RX ADMIN — ROCURONIUM BROMIDE 10 MG: 10 INJECTION INTRAVENOUS at 08:26

## 2021-05-12 RX ADMIN — DEXAMETHASONE SODIUM PHOSPHATE 4 MG: 4 INJECTION, SOLUTION INTRA-ARTICULAR; INTRALESIONAL; INTRAMUSCULAR; INTRAVENOUS; SOFT TISSUE at 07:50

## 2021-05-12 RX ADMIN — SODIUM CHLORIDE, POTASSIUM CHLORIDE, SODIUM LACTATE AND CALCIUM CHLORIDE: 600; 310; 30; 20 INJECTION, SOLUTION INTRAVENOUS at 07:40

## 2021-05-12 RX ADMIN — ROCURONIUM BROMIDE 10 MG: 10 INJECTION INTRAVENOUS at 08:49

## 2021-05-12 RX ADMIN — LIDOCAINE HYDROCHLORIDE 50 MG: 10 INJECTION, SOLUTION INFILTRATION; PERINEURAL at 07:50

## 2021-05-12 RX ADMIN — KETOROLAC TROMETHAMINE 15 MG: 15 INJECTION, SOLUTION INTRAMUSCULAR; INTRAVENOUS at 12:33

## 2021-05-12 RX ADMIN — FENTANYL CITRATE 50 MCG: 50 INJECTION, SOLUTION INTRAMUSCULAR; INTRAVENOUS at 09:41

## 2021-05-12 RX ADMIN — FENTANYL CITRATE 50 MCG: 50 INJECTION, SOLUTION INTRAMUSCULAR; INTRAVENOUS at 09:35

## 2021-05-12 RX ADMIN — FENTANYL CITRATE 50 MCG: 50 INJECTION, SOLUTION INTRAMUSCULAR; INTRAVENOUS at 10:30

## 2021-05-12 RX ADMIN — BUPIVACAINE HYDROCHLORIDE 30 ML: 5 INJECTION, SOLUTION EPIDURAL; INTRACAUDAL at 09:47

## 2021-05-12 RX ADMIN — FENTANYL CITRATE 100 MCG: 50 INJECTION, SOLUTION INTRAMUSCULAR; INTRAVENOUS at 07:50

## 2021-05-12 RX ADMIN — ONDANSETRON HYDROCHLORIDE 4 MG: 2 INJECTION, SOLUTION INTRAVENOUS at 09:29

## 2021-05-12 RX ADMIN — GLYCOPYRROLATE 0.8 MG: 0.2 INJECTION, SOLUTION INTRAMUSCULAR; INTRAVENOUS at 09:29

## 2021-05-12 RX ADMIN — MIDAZOLAM 2 MG: 1 INJECTION INTRAMUSCULAR; INTRAVENOUS at 07:41

## 2021-05-12 RX ADMIN — HYDROMORPHONE HYDROCHLORIDE 1 MG: 1 INJECTION, SOLUTION INTRAMUSCULAR; INTRAVENOUS; SUBCUTANEOUS at 08:12

## 2021-05-12 RX ADMIN — METFORMIN HYDROCHLORIDE 500 MG: 500 TABLET, FILM COATED ORAL at 17:46

## 2021-05-12 RX ADMIN — ROCURONIUM BROMIDE 50 MG: 10 INJECTION INTRAVENOUS at 07:50

## 2021-05-12 RX ADMIN — NEOSTIGMINE METHYLSULFATE 5 MG: 1 INJECTION, SOLUTION INTRAVENOUS at 09:29

## 2021-05-12 RX ADMIN — SODIUM CHLORIDE, POTASSIUM CHLORIDE, SODIUM LACTATE AND CALCIUM CHLORIDE: 600; 310; 30; 20 INJECTION, SOLUTION INTRAVENOUS at 14:31

## 2021-05-12 RX ADMIN — PROPOFOL 200 MG: 10 INJECTION, EMULSION INTRAVENOUS at 07:50

## 2021-05-12 ASSESSMENT — MIFFLIN-ST. JEOR: SCORE: 1882.91

## 2021-05-12 NOTE — ANESTHESIA PREPROCEDURE EVALUATION
Anesthesia Pre-Procedure Evaluation    Patient: Eloina Aj   MRN: [id number] : 1985        Preoperative Diagnosis: Cyst of spleen [D73.4]   Procedure : Procedure(s):  LAPAROSCOPIC HAND ASSISTED SPLEENIC CYST EXCISION     Past Medical History:   Diagnosis Date     Anorexia     From age [age].     Chronic depressive personality disorder     hx of anxiety and depression     Depressive disorder      Gastroesophageal reflux disease      PCOS (polycystic ovarian syndrome)      Thyroid disease      Tympanosclerosis, unspecified as to involvement       Past Surgical History:   Procedure Laterality Date     APPENDECTOMY       BIOPSY  10/4/2019    Biopsy of cervix.      SECTION        SECTION N/A 3/29/2018    Procedure:  SECTION;  Repeat  Section;  Surgeon: Fern Nicolas MD;  Location: [facility]     EYE SURGERY Bilateral     muscle surgery for wandering eyes     IR SUBDIAPHRAGM ABSCESS DRAIN  2021     ORTHOPEDIC SURGERY Right     major reconstruction right shoulder     TYMPANOPLASTY Left       Allergies   Allergen Reactions     Atarax [Hydroxyzine] Palpitations     Sulfa Drugs Swelling and Rash      Social History     Tobacco Use     Smoking status: Never Smoker     Smokeless tobacco: Never Used   Substance Use Topics     Alcohol use: No      Wt Readings from Last 1 Encounters:   21 121.2 kg (267 lb 3.2 oz)        Anesthesia Evaluation   Pt has had prior anesthetic. Type: General.        ROS/MED HX  ENT/Pulmonary:  - neg pulmonary ROS     Neurologic:  - neg neurologic ROS     Cardiovascular:  - neg cardiovascular ROS     METS/Exercise Tolerance:     Hematologic: Comments: Lab Test        05/05/21     03/25/21     03/05/21     10/10/19                       1024          0850          0945          0934          WBC          5.0           --          4.3          5.7           HGB          12.5          --          11.5*        11.9          MCV          88             --          88           89            PLT          216          156          153          158           INR           --          1.05          --           --            Lab Test        05/05/21     03/05/21     03/15/19                       1024          0945          1010          NA           139          139          141           POTASSIUM    4.1          4.2          4.1           CHLORIDE     106          106          106           CO2          30           29           27            BUN          11           12           15            CR           0.89         1.04         0.94          ANIONGAP     3            4            8             JENNIFER          8.8          9.2          9.2           GLC          81           90           97                  Musculoskeletal:  - neg musculoskeletal ROS     GI/Hepatic:     (+) GERD, Asymptomatic on medication,     Renal/Genitourinary:  - neg Renal ROS     Endo: Comment: polycystic ovarian syndrome    (+) thyroid problem, hypothyroidism, Obesity,     Psychiatric/Substance Use:     (+) psychiatric history depression     Infectious Disease:  - neg infectious disease ROS     Malignancy:  - neg malignancy ROS     Other:            Physical Exam    Airway        Mallampati: II   TM distance: > 3 FB   Neck ROM: full   Mouth opening: > 3 cm    Respiratory Devices and Support         Dental  no notable dental history         Cardiovascular   cardiovascular exam normal          Pulmonary   pulmonary exam normal                OUTSIDE LABS:  CBC:   Lab Results   Component Value Date    WBC 5.0 05/05/2021    WBC 4.3 03/05/2021    HGB 12.5 05/05/2021    HGB 11.5 (L) 03/05/2021    HCT 39.1 05/05/2021    HCT 37.5 03/05/2021     05/05/2021     03/25/2021     BMP:   Lab Results   Component Value Date     05/05/2021     03/05/2021    POTASSIUM 4.1 05/05/2021    POTASSIUM 4.2 03/05/2021    CHLORIDE 106 05/05/2021    CHLORIDE 106 03/05/2021    CO2 30  05/05/2021    CO2 29 03/05/2021    BUN 11 05/05/2021    BUN 12 03/05/2021    CR 0.89 05/05/2021    CR 1.04 03/05/2021    GLC 81 05/05/2021    GLC 90 03/05/2021     COAGS:   Lab Results   Component Value Date    INR 1.05 03/25/2021     POC:   Lab Results   Component Value Date    BGM 81 03/30/2018    HCG Negative 05/12/2021     HEPATIC:   Lab Results   Component Value Date    ALBUMIN 3.4 05/05/2021    PROTTOTAL 6.3 (L) 05/05/2021    ALT 22 05/05/2021    AST 21 05/05/2021    ALKPHOS 65 05/05/2021    BILITOTAL 0.2 05/05/2021     OTHER:   Lab Results   Component Value Date    A1C 5.4 07/28/2020    JENNIFER 8.8 05/05/2021    TSH 2.40 03/05/2021    T4 1.00 10/24/2018    CRP 21.0 (H) 03/05/2021    SED 13 03/05/2021       Anesthesia Plan    ASA Status:  2      Anesthesia Type: General.     - Airway: ETT   Induction: Propofol, Intravenous.   Maintenance: Balanced.        Consents    Anesthesia Plan(s) and associated risks, benefits, and realistic alternatives discussed. Questions answered and patient/representative(s) expressed understanding.     - Discussed with:  Patient      - Extended Intubation/Ventilatory Support Discussed: No.      - Patient is DNR/DNI Status: No    Use of blood products discussed: Yes.     - Discussed with: Patient.     Postoperative Care    Pain management: IV analgesics.   PONV prophylaxis: Ondansetron (or other 5HT-3), Dexamethasone or Solumedrol     Comments:                Juan Soto MD

## 2021-05-12 NOTE — ANESTHESIA CARE TRANSFER NOTE
Patient: Eloina Aj    Procedure(s):  LAPAROSCOPIC HAND ASSISTED SPLENIC CYST EXCISION    Diagnosis: Cyst of spleen [D73.4]  Diagnosis Additional Information: No value filed.    Anesthesia Type:   General     Note:    Oropharynx: oropharynx clear of all foreign objects  Level of Consciousness: awake  Oxygen Supplementation: face mask    Independent Airway: airway patency satisfactory and stable  Dentition: dentition unchanged  Vital Signs Stable: post-procedure vital signs reviewed and stable  Report to RN Given: handoff report given  Patient transferred to: PACU    Handoff Report: Identifed the Patient, Identified the Reponsible Provider, Reviewed the pertinent medical history, Discussed the surgical course, Reviewed Intra-OP anesthesia mangement and issues during anesthesia, Set expectations for post-procedure period and Allowed opportunity for questions and acknowledgement of understanding      Vitals: (Last set prior to Anesthesia Care Transfer)  CRNA VITALS  5/12/2021 0932 - 5/12/2021 1010      5/12/2021             Pulse:  69    SpO2:  100 %    Resp Rate (observed):  10        Electronically Signed By: NATHANAEL Anthony CRNA  May 12, 2021  10:10 AM

## 2021-05-12 NOTE — ANESTHESIA POSTPROCEDURE EVALUATION
Patient: Eloina Aj    Procedure(s):  LAPAROSCOPIC HAND ASSISTED SPLENIC CYST EXCISION    Diagnosis:Cyst of spleen [D73.4]  Diagnosis Additional Information: No value filed.    Anesthesia Type:  General    Note:     Postop Pain Control: Uneventful            Sign Out: Well controlled pain   PONV: No   Neuro/Psych: Uneventful            Sign Out: Acceptable/Baseline neuro status   Airway/Respiratory: Uneventful            Sign Out: Acceptable/Baseline resp. status   CV/Hemodynamics: Uneventful            Sign Out: Acceptable CV status; No obvious hypovolemia; No obvious fluid overload   Other NRE: NONE   DID A NON-ROUTINE EVENT OCCUR? No           Last vitals:  Vitals:    05/12/21 1100 05/12/21 1105 05/12/21 1129   BP:  120/73 116/66   Pulse: 65 65 71   Resp: 18 17 16   Temp:   98  F (36.7  C)   SpO2: 100% 100% 97%       Last vitals prior to Anesthesia Care Transfer:  CRNA VITALS  5/12/2021 0932 - 5/12/2021 1032      5/12/2021             Pulse:  69    SpO2:  100 %    Resp Rate (observed):  10          Electronically Signed By: Juan Soto MD  May 12, 2021  11:36 AM

## 2021-05-12 NOTE — PLAN OF CARE
PRIMARY DIAGNOSIS: Laparoscopic cyst excision  OUTPATIENT/OBSERVATION GOALS TO BE MET BEFORE DISCHARGE:  1. Stable vital signs Yes  2. Tolerating diet:Yes clears now  3. Pain controlled with oral pain medications:  No  4. Positive bowel sounds:  Yes  5. Voiding without difficulty:  due to void, matthews removed in PACU at 0935 1430 voided in bathroom  6. Able to ambulate:  Yes  One assist  7. Provider specific discharge goals met:  No    Discharge Planner Nurse   Safe discharge environment identified: Yes  Barriers to discharge: Yes, advance diet, void, pain control with orals       Entered by: Sultana Odonnell 05/12/2021 12:50 PM     Please review provider order for any additional goals.   Nurse to notify provider when observation goals have been met and patient is ready for discharge.    RAMA drain in and lap incisons covered with 4x4.  at bedside. Tolerating clears and toradol/dilaudid given x 1 for pain.

## 2021-05-12 NOTE — PROGRESS NOTES
Sacral mepilex applied for preventative measures in preop at 0730, skin underneath clean dry intact at time of application

## 2021-05-12 NOTE — OP NOTE
Procedure Date: 05/12/2021    PREOPERATIVE DIAGNOSIS:  Recurrent large splenic cyst.    POSTOPERATIVE DIAGNOSIS:  Recurrent large splenic cyst.    PROCEDURE:  Laparoscopic hand-assisted excision/marsupialization of large splenic cyst.    ANESTHESIA:  General plus local.    SURGEON:  Lewis Wilkes M.D.    ASSISTANT:  Lopez Cook PA-C.  The physician assistant was medically necessary for his skills in suturing, exposure, and camera management throughout the operation.    SPECIMENS:  Portion of a splenic cyst for routine handling.    COMPLICATIONS:  None.    INDICATIONS FOR PROCEDURE:  Ms. Aj is a healthy 36-year-old female whom I met initially in March of this year when she presented with a history of left upper quadrant pain, fullness and satiety.  She was found at that time to have a large splenic cyst displacing her stomach medially.  Initially this was managed by aspiration and drainage in interventional radiology department removing over 3 liters of simple fluid.  Despite multiple drainage attempts as well as attempts at sclerosing the cyst, it continued to recur every time the drain was removed.  Because of this, the patient was offered a laparoscopic hand-assisted cyst excision and marsupialization.  Risks of the operation including infection, bleeding, harm to structures, need for potential splenectomy in the event of severe bleeding were reviewed, as was the small, but still potential of recurrence of the cyst.  The patient verbalized understanding of the above and consented to proceed.    FINDINGS:  The patient had persistent large splenic cyst at the superior medial aspect of the parenchyma.  This was thick walled consistent with her recent sclerosis administration and contained approximately 900 mL of serous fluid.  More than 50% of the circumference of the cyst was removed and marsupialized.    DESCRIPTION OF PROCEDURE:  The patient put under excellent general anesthesia, Crain catheter was placed  and the patient was positioned in a modified right lateral decubitus position.  The abdomen was subsequently prepped and draped in the usual fashion.  A timeout was then performed confirming the patient, procedure to be done as well as drug allergies.  She did receive a dose of Ancef for infection prophylaxis prior to beginning our incision.  We began by making a 6 cm midline incision approximately 5 cm above the umbilical skin, dissected through subcutaneous fat until we encountered the midline fascia.  The fascia was incised with electrocautery exposing the preperitoneal fat.  The preperitoneal fat was then carefully divided until we entered the peritoneum under direct vision.  The fascia was then opened the entire length of the skin with my fingers as a back stab using electrocautery.  The applied Gelport was then placed into this midline wound.  The left upper quadrant was palpated, there was some generousness to the spleen.  We placed 2 further 5 mm ports in the left olga-abdomen at about the level of the umbilicus as well as one in the left upper quadrant.  Insufflation was applied and the left upper quadrant was surveyed.  There were some omental adhesions adhered to what appeared to be some persistent cyst at the medial edge of the spleen parenchyma.  There were some omental adhesions and a natural attachments of the omentum to the anterior abdominal wall, these were taken down with a Harmonic scalpel.  Adhesions to the cyst were likewise divided to expose the cyst further.  We picked a point superiorly where the cyst was clearly delineated from a normal spleen parenchyma and used the oscillating edge of the Harmonic to enter the cyst itself.  This extruded some simple serous fluid and this hole was subsequently intubated with our suction  and approximately 900 mL of simple serous fluid was then aspirated.  This decompressed the cyst and allowed us to better delineate the edge between the cyst and  spleen parenchyma itself.  The superior edge of the cyst was quite densely adhered to the diaphragm.  This was taken down with a combination of blunt dissection and a Harmonic scalpel dissection.  We then entered the cyst closer to the spleen edge and dissected the cyst free from medial to lateral as well from along the medial edge from inferior to superior.  We eventually connected these 2 dissection lines at the superior edge of the cystic cavity and removed an approximately 15 x 8 cm swath of thickened cyst rind.  The remainder of the marsupialized cyst cavity was adhered to the underside of the diaphragm as well as to the spleen itself.  This was kept intact as we did not want to damage the adjacent organs or get into significant amount of bleeding on the cyst itself.  The left upper quadrant was then copiously irrigated and a tongue of omentum was brought up into the old cyst cavity to attempt to reduce the likelihood of re-coalescence.  A 15 round channel drain was introduced through our inferior 5 mm port site.  The drainage portion was placed into the remaining cyst cavity.  The port was removed over this and the drain itself was anchored to the skin with a 2-0 nylon stitch.  The remaining port was removed as was the hand port.  We closed the midline fascia with 0 PDS x 2.  30 mL of 0.5% Marcaine instilled in all incisions.  Skin was closed with 4-0 Vicryls in deep dermal fashion.  Skin glue was then applied to the closed wounds.  The patient tolerated the procedure well.  Crain catheter was removed and she was extubated and brought to recovery in excellent condition.  Sharps and sponge counts were correct at the conclusion of the case.  An additional uncounted narrow malleable was discovered during final counts, but intraoperative x-ray was negative for any retained foreign bodies.    Lewis Tracy MD        D: 05/12/2021   T: 05/12/2021   MT: [id number]    Name:     DIMITRI ALLEN  MRN:      [id number]         Account:        [id number]   :      1985           Procedure Date: 2021     Document: [id number]    cc:  MD Chandler eYn MD

## 2021-05-12 NOTE — ANESTHESIA PROCEDURE NOTES
Airway       Patient location during procedure: OR       Procedure Start/Stop Times: 5/12/2021 7:47 AM  Staff -        CRNA: Britt Greene APRN CRNA       Performed By: CRNA  Consent for Airway        Urgency: elective  Indications and Patient Condition       Indications for airway management: sue-procedural       Induction type:intravenous       Mask difficulty assessment: 1 - vent by mask    Final Airway Details       Final airway type: endotracheal airway       Successful airway: ETT - single  Endotracheal Airway Details        ETT size (mm): 7.0       Cuffed: yes       Successful intubation technique: direct laryngoscopy       DL Blade Type: Aj 2       Grade View of Cords: 1       Position: Right       Measured from: gums/teeth       Secured at (cm): 22       Bite block used: None    Post intubation assessment        Placement verified by: capnometry, equal breath sounds and chest rise        Number of attempts at approach: 1       Number of other approaches attempted: 0       Ease of procedure: easy       Dentition: Intact and Unchanged    Medication(s) Administered   Medication Administration Time: 5/12/2021 7:47 AM

## 2021-05-12 NOTE — BRIEF OP NOTE
[facility]    Brief Operative Note    Pre-operative diagnosis: Cyst of spleen [D73.4]  Post-operative diagnosis Large recurrent splenic cyst.    Procedure: Procedure(s):  LAPAROSCOPIC HAND ASSISTED SPLENIC CYST EXCISION  Surgeon: Surgeon(s) and Role:     * Lewis Soni MD - Primary     * Lopez Cook PA-C - Assisting  Anesthesia: General   Estimated blood loss: Less than 10 ml  Drains: Johnathan-Zheng  Specimens:   ID Type Source Tests Collected by Time Destination   A : splenic cyst Tissue Spleen SURGICAL PATHOLOGY EXAM Lewis Soni MD 5/12/2021  9:21 AM      Findings:   Large cyst involving superior/medial aspect of the spleen containing approximately 900mL serous fluid, thick walled cyst adheared to the diaphragm and peritoneum..  Complications: None.  Implants: * No implants in log *

## 2021-05-13 VITALS
WEIGHT: 265 LBS | TEMPERATURE: 98.6 F | SYSTOLIC BLOOD PRESSURE: 92 MMHG | BODY MASS INDEX: 44.15 KG/M2 | OXYGEN SATURATION: 96 % | HEIGHT: 65 IN | HEART RATE: 71 BPM | RESPIRATION RATE: 20 BRPM | DIASTOLIC BLOOD PRESSURE: 54 MMHG

## 2021-05-13 LAB
ANION GAP SERPL CALCULATED.3IONS-SCNC: <1 MMOL/L (ref 3–14)
BUN SERPL-MCNC: 9 MG/DL (ref 7–30)
CALCIUM SERPL-MCNC: 8.2 MG/DL (ref 8.5–10.1)
CHLORIDE SERPL-SCNC: 106 MMOL/L (ref 94–109)
CO2 SERPL-SCNC: 31 MMOL/L (ref 20–32)
COPATH REPORT: NORMAL
CREAT SERPL-MCNC: 0.93 MG/DL (ref 0.52–1.04)
ERYTHROCYTE [DISTWIDTH] IN BLOOD BY AUTOMATED COUNT: 15.1 % (ref 10–15)
GFR SERPL CREATININE-BSD FRML MDRD: 78 ML/MIN/{1.73_M2}
GLUCOSE BLDC GLUCOMTR-MCNC: 112 MG/DL (ref 70–99)
GLUCOSE SERPL-MCNC: 112 MG/DL (ref 70–99)
HCT VFR BLD AUTO: 32.4 % (ref 35–47)
HGB BLD-MCNC: 10.3 G/DL (ref 11.7–15.7)
INTERPRETATION ECG - MUSE: NORMAL
MCH RBC QN AUTO: 28.4 PG (ref 26.5–33)
MCHC RBC AUTO-ENTMCNC: 31.8 G/DL (ref 31.5–36.5)
MCV RBC AUTO: 89 FL (ref 78–100)
PLATELET # BLD AUTO: 146 10E9/L (ref 150–450)
POTASSIUM SERPL-SCNC: 3.8 MMOL/L (ref 3.4–5.3)
RBC # BLD AUTO: 3.63 10E12/L (ref 3.8–5.2)
SODIUM SERPL-SCNC: 137 MMOL/L (ref 133–144)
WBC # BLD AUTO: 7.4 10E9/L (ref 4–11)

## 2021-05-13 PROCEDURE — 250N000013 HC RX MED GY IP 250 OP 250 PS 637: Performed by: SURGERY

## 2021-05-13 PROCEDURE — 85027 COMPLETE CBC AUTOMATED: CPT | Performed by: SURGERY

## 2021-05-13 PROCEDURE — 80048 BASIC METABOLIC PNL TOTAL CA: CPT | Performed by: SURGERY

## 2021-05-13 PROCEDURE — 36415 COLL VENOUS BLD VENIPUNCTURE: CPT | Performed by: SURGERY

## 2021-05-13 PROCEDURE — 82962 GLUCOSE BLOOD TEST: CPT

## 2021-05-13 RX ORDER — OXYCODONE HYDROCHLORIDE 5 MG/1
5-10 TABLET ORAL
Qty: 10 TABLET | Refills: 0 | Status: SHIPPED | OUTPATIENT
Start: 2021-05-13 | End: 2021-05-24

## 2021-05-13 RX ADMIN — ESCITALOPRAM OXALATE 30 MG: 10 TABLET ORAL at 08:37

## 2021-05-13 RX ADMIN — LEVOTHYROXINE SODIUM 75 MCG: 0.07 TABLET ORAL at 06:15

## 2021-05-13 RX ADMIN — BUPROPION HYDROCHLORIDE 150 MG: 150 TABLET, EXTENDED RELEASE ORAL at 08:37

## 2021-05-13 RX ADMIN — METFORMIN HYDROCHLORIDE 500 MG: 500 TABLET, FILM COATED ORAL at 08:37

## 2021-05-13 RX ADMIN — Medication 25 MCG: at 08:37

## 2021-05-13 NOTE — PROGRESS NOTES
[facility]  POD#1 s/p lap hand assisted splenic cyst excision  Feels well, minimal pain, tolerated solids at breakfast, feels ready for discharge today.  Hgb 10.3  No fevers, P72  65mL from RAMA  NAD, comfortable  Abd soft, glue dry, RAMA serous  Doing well, appropriate for discharge  Suspect hgb drop from dilution  Diet ad kin  Drain teaching  Rx for oxycodone done  She will see our clinic RN for drain removal when <30mL/d  Post op with me in 2-3wks.

## 2021-05-13 NOTE — PLAN OF CARE
PRIMARY DIAGNOSIS: Laparoscopic cyst excision  OUTPATIENT/OBSERVATION GOALS TO BE MET BEFORE DISCHARGE:  1. Stable vital signs Yes  2. Tolerating diet: tolerated clears  3. Pain controlled with oral pain medications:  Yes  4. Positive bowel sounds:  Yes  5. Voiding without difficulty:  Yes  6. Able to ambulate:  Yes  7. Provider specific discharge goals met:  No    Discharge Planner Nurse   Safe discharge environment identified: Yes  Barriers to discharge: Yes       Entered by: ROSE FRASER 05/12/2021      Please review provider order for any additional goals.   Nurse to notify provider when observation goals have been met and patient is ready for discharge.

## 2021-05-13 NOTE — PLAN OF CARE
PRIMARY DIAGNOSIS: Laparoscopic cyst excision  OUTPATIENT/OBSERVATION GOALS TO BE MET BEFORE DISCHARGE:  1. Stable vital signs Yes  2. Tolerating diet: tolerated Fulls  3. Pain controlled with oral pain medications:  Yes  4. Positive bowel sounds:  Yes  5. Voiding without difficulty:  Yes  6. Able to ambulate:  Yes  7. Provider specific discharge goals met:  Yes     Discharge Planner Nurse   Safe discharge environment identified: Yes  Barriers to discharge: Yes       Entered by: Leena Pendleton  05/12/2021   Please review provider order for any additional goals.   Nurse to notify provider when observation goals have been met and patient is ready for discharge.

## 2021-05-13 NOTE — PROGRESS NOTES
"Patient is alert and oriented x4. VS WNL and documented on the FS. Lung sounds clear in all lobes and patient is on RA. Denies SOB. Active bowels sounds. Patient voiding spontaneously. Denied pain this morning. SL. Regular diet and tolerating. Abdominal incisions are dry and intact. Dermabond in place. RAMA drain patent with serosanguinous  drainage. Patient will discharge home with RAMA. SBA/Independent when up. Plan: Discharge at 1000.    BP 91/59 (BP Location: Left arm)   Pulse 72   Temp 98.7  F (37.1  C) (Oral)   Resp 20   Ht 1.651 m (5' 5\")   Wt 120.2 kg (265 lb)   LMP 05/01/2021 (Exact Date)   SpO2 98%   Breastfeeding No   BMI 44.10 kg/m      "

## 2021-05-13 NOTE — PLAN OF CARE
"Patient's After Visit Summary was reviewed with patient.   Patient verbalized understanding of After Visit Summary, recommended follow up and was given an opportunity to ask questions.   Discharge medications sent home with patient/family: Oxycodone    Discharged with nurse with all belongings. Patient medically cleared to discharge. Patient discharged with RAMA drain. Denied pain. Incisions are dry and intact. Education provided on RAMA care.  will transport home.   BP 91/59 (BP Location: Left arm)   Pulse 72   Temp 98.7  F (37.1  C) (Oral)   Resp 20   Ht 1.651 m (5' 5\")   Wt 120.2 kg (265 lb)   LMP 05/01/2021 (Exact Date)   SpO2 98%   Breastfeeding No   BMI 44.10 kg/m    OBSERVATION patient END time: 1000        "

## 2021-05-13 NOTE — PLAN OF CARE
PRIMARY DIAGNOSIS: Laparoscopic cyst excision  OUTPATIENT/OBSERVATION GOALS TO BE MET BEFORE DISCHARGE:  1. Stable vital signs Yes  2. Tolerating diet: tolerated Full liquids  3. Pain controlled with oral pain medications:  Yes  4. Positive bowel sounds:  Yes  5. Voiding without difficulty:  Yes  6. Able to ambulate:  Yes  7. Provider specific discharge goals met:  Yes     Discharge Planner Nurse   Safe discharge environment identified: Yes  Barriers to discharge: Yes       Entered by: Leena Pendleton  05/13/2021     Patient denies pain, ICE pack under abdominal binder. Midline incision GEORGE/Dermabond. OOB SBA.  RAMA drain patent, serosanguinous output. Tolerating full liquid diet, IV SL, will advance diet for breakfast.   Please review provider order for any additional goals.   Nurse to notify provider when observation goals have been met and patient is ready for discharge.

## 2021-05-13 NOTE — PLAN OF CARE
PRIMARY DIAGNOSIS: Laparoscopic cyst excision  OUTPATIENT/OBSERVATION GOALS TO BE MET BEFORE DISCHARGE:  1. Stable vital signs Yes  2. Tolerating diet: tolerated Full liquid   3. Pain controlled with oral pain medications:  Yes  4. Positive bowel sounds:  Yes  5. Voiding without difficulty:  Yes  6. Able to ambulate:  Yes  7. Provider specific discharge goals met:  No    Discharge Planner Nurse   Safe discharge environment identified: Yes  Barriers to discharge: Yes       Entered by: ROSE FRASER 05/12/2021   Vital signs:  Temp: 98.1  F (36.7  C) Temp src: Oral BP: 123/59 Pulse: 81   Resp: 22 SpO2: 93 % O2 Device: None (Room air) Alert and oriented x4. CAPNO on. Rated pain 4/10. Declined intervention. Ambulated in a kay and to the bathroom. Abdminal dressing C.D.I. and wrapped with Abd binder. 15cc clear pink output stripped from RAMA drain. Advanced to Full liquid for supper. Ate 50%. Denies nausea/ vomiting. IVF running @100mL/hr. Able to pass gas. PCDs on. Stand by assist. Will continue to monitor.      Please review provider order for any additional goals.   Nurse to notify provider when observation goals have been met and patient is ready for discharge.

## 2021-05-13 NOTE — DISCHARGE INSTRUCTIONS
HOME CARE FOLLOWING ABDOMINAL SURGERY  LAKSHMI Jesus, GOLDEN Byers R. O Donnell, J. Shaheen    INCISIONAL CARE:  If Dermabond (a type of skin glue) is present, leave in place until it wears/flakes off.  Empty and record the volume from your drain daily. Strip the tubing several times a day as shown by your inpatient nurse.    BATHING:  Avoid baths for 1 week after surgery.  Showers are okay.  You may wash your hair at any time.  Gently pat your incision dry after bathing.    ACTIVITY:  Light Activity -- you may immediately be up and about as tolerated.  Driving -- you may drive when comfortable and off narcotic pain medications.  Light Work -- resume when comfortable off pain medications.  (If you can drive, you probably can work.)  Strenuous Work/Activity -- limit lifting to 20 pounds for 4 weeks.  Then, progressively increase with time.  Active Sports (running, biking, etc.) -- cautiously resume after 6 weeks.    DISCOMFORT:  Use pain medications as prescribed by your surgeon.  Take the pain medication with some food, when possible, to minimize side effects.  Expect gradual improvement.    DIET:  Return to diet you were on before surgery, unless you are given specific diet instructions.  Drink plenty of fluids.  While taking pain medications, increase dietary fiber or add a fiber supplementation like Metamucil or Citrucel to help prevent constipation - a possible side effect of pain medications.    NAUSEA:  If nauseated from the anesthetic/pain meds; rest in bed, get up cautiously with assistance, and drink clear liquids (juice, tea, broth).    RETURN APPOINTMENT:  Schedule a follow-up visit 2-3 weeks after discharge from the hospital.  Please see our nurse when your drain output is less than 30mL in a day.  Office Phone:  [phone]     CONTACT US IF THE FOLLOWING DEVELOPS:   1. A fever that is above 101     2. If there is a large amount of drainage, bleeding, or swelling.   3. Severe  pain that is not relieved by your prescription.   4. Drainage that is thick, cloudy, yellow, green or white.   5. Any other questions

## 2021-05-17 ENCOUNTER — TELEPHONE (OUTPATIENT)
Dept: SURGERY | Facility: CLINIC | Age: 36
End: 2021-05-17

## 2021-05-17 NOTE — TELEPHONE ENCOUNTER
S/p Laparoscopic hand-assisted excision/marsupialization of large splenic cyst.  Date: 5/12/21  Surgeon: Dr. Wilkes    Patient is calling to report that she has some drainage around drain tubing on gauze dressing.  This has happened intermittently over the last couple of days.    Reports drain output of 45ml yesterday.  Drainage is thin and yellow to light orange in color.      Reassured patient. She will continue to change gauze dressing at drain site as needed to keep area clean and dry.  She will call clinic when drain output is less than 30ml/24hrs for appointment to have drain removed.      Patient verbalized understanding.  She will call clinic if any further questions or concerns.

## 2021-05-20 ENCOUNTER — TELEPHONE (OUTPATIENT)
Dept: SURGERY | Facility: CLINIC | Age: 36
End: 2021-05-20

## 2021-05-20 NOTE — TELEPHONE ENCOUNTER
S/p Laparoscopic hand-assisted excision/marsupialization of large splenic cyst.  Date: 5/12/21  Surgeon: Dr. Wilkes    Patient reports that she continues to have greater than 30 ml per 24 hours from drain.  Outputs have been between 35-55ml of thin bradley yellow output.   States she was told the drain would be in about a week - wondering if it is ok that it stays in longer.      We discussed that outputs and time the drain is in can vary. She will call with update on drain output totals next week.  If drain is still in at 2 weeks post-op, we can discuss with Dr. Wilkes about whether drain can come out or if it should remain in place until 24 hour output from drain is less than 30ml.

## 2021-05-24 ENCOUNTER — OFFICE VISIT (OUTPATIENT)
Dept: SURGERY | Facility: CLINIC | Age: 36
End: 2021-05-24
Payer: COMMERCIAL

## 2021-05-24 ENCOUNTER — TELEPHONE (OUTPATIENT)
Dept: SURGERY | Facility: CLINIC | Age: 36
End: 2021-05-24

## 2021-05-24 VITALS
RESPIRATION RATE: 16 BRPM | BODY MASS INDEX: 44.15 KG/M2 | SYSTOLIC BLOOD PRESSURE: 116 MMHG | DIASTOLIC BLOOD PRESSURE: 70 MMHG | OXYGEN SATURATION: 98 % | HEIGHT: 65 IN | HEART RATE: 93 BPM | WEIGHT: 265 LBS

## 2021-05-24 DIAGNOSIS — Z09 SURGICAL FOLLOWUP VISIT: Primary | ICD-10-CM

## 2021-05-24 PROCEDURE — 99024 POSTOP FOLLOW-UP VISIT: CPT | Performed by: PHYSICIAN ASSISTANT

## 2021-05-24 ASSESSMENT — MIFFLIN-ST. JEOR: SCORE: 1882.91

## 2021-05-24 NOTE — TELEPHONE ENCOUNTER
Name of caller: Eloina    Reason for Call:  Drain ready to be removed    Surgeon:  Dr. Wilkes    Recent Surgery:  Yes.    If yes, when & what type:  5/12, Laparoscopic hand-assisted excision/marsupialization of large splenic cyst.         Best phone number to reach pt at is: [phone]  Ok to leave a message with medical info? Yes.    Pharmacy preferred (if calling for a refill): NA

## 2021-05-24 NOTE — PROGRESS NOTES
5/24/2021    [facility] Note     Subjective:  Eloina Aj is here for drain removal. She underwent laparoscopic hand-assisted excision/marsupialization of large splenic cyst by Dr. Wilkes on 5/12/21. Her drain output was 12 ml in the past 24 hours. Today she  tells me she has been feeling well overlall since surgery.  She does report discomfort at the drain site, as well as firmness and pink discoloration.  She otherwise has no complaints. She currently does not require narcotic pain medications, she is eating a normal diet and her bowels are regular.     Objective:  Abd - Abdomen soft, non-tender.   Inc - Healing well, well approximated and without signs of infection  LLQ drain - clear serosanguinous fluid in bulb. Drain was removed, noted to have large clot in tubing.   Skin surrounding drain site is edematous with light pink discoloration.  There is mild fibrin noted at drain site as well.     Assessment:  S/p laparoscopic hand-assisted excision/marsupialization of large splenic cyst.   The pathology revealed epithelial cyst with secondary degenerative changes, benign.    Plan:  Monitor skin discoloration at drain site, discussed that it does not appear infected at this time.  F/u Dr. Wilkes next week as planned      Tasneem Owens PA-C      Please route or send letter to:  *None*

## 2021-05-24 NOTE — TELEPHONE ENCOUNTER
S/p Laparoscopic hand-assisted excision/marsupialization of large splenic cyst.  Date: 5/12/21  Surgeon: Dr. Wilkes    Patient is calling for appointment to have drain removed.  Reports a scant amount out of drain yesterday.  Emptied drain this morning for a total of approx 12-13ml.      Scheduled for drain removal this morning at 10am with clinic PA.  Pt to bring output record to appointment.

## 2021-06-03 ENCOUNTER — OFFICE VISIT (OUTPATIENT)
Dept: SURGERY | Facility: CLINIC | Age: 36
End: 2021-06-03
Payer: COMMERCIAL

## 2021-06-03 VITALS
WEIGHT: 265 LBS | DIASTOLIC BLOOD PRESSURE: 84 MMHG | HEART RATE: 74 BPM | OXYGEN SATURATION: 97 % | SYSTOLIC BLOOD PRESSURE: 116 MMHG | RESPIRATION RATE: 16 BRPM | BODY MASS INDEX: 44.15 KG/M2 | HEIGHT: 65 IN

## 2021-06-03 DIAGNOSIS — Z09 SURGICAL FOLLOWUP VISIT: Primary | ICD-10-CM

## 2021-06-03 PROCEDURE — 99024 POSTOP FOLLOW-UP VISIT: CPT | Performed by: SURGERY

## 2021-06-03 ASSESSMENT — MIFFLIN-ST. JEOR: SCORE: 1882.91

## 2021-06-03 NOTE — PROGRESS NOTES
Re:  Eloina Aj- 1985    Dear Dr. Silveira,    I had the pleasure of seeing Eloina today in follow-up for her laparoscopic hand assisted excision and marsupialization of a large splenic cyst on 5/12/21. The patient tolerated the procedure well. We reviewed the pathology again today which showed a epithelial cyst associated with inflammation (from prior attempts at sclerosing). She had her surgical drain removed on 5/24 and states that there is occasional serous drainage from the site which she keeps dressed with a Band-Aid.    On exam, the patient's incisions are healing well without signs of infection.    Eloina is doing very well postoperatively. We will be happy to see her in the future as needed. She was encouraged to call us with any questions or concerns.      Sincerely,         Lewis Wilkes MD      Please route or send letter to:  Primary Care Provider (PCP)

## 2021-06-04 VITALS
WEIGHT: 270.2 LBS | HEART RATE: 64 BPM | DIASTOLIC BLOOD PRESSURE: 66 MMHG | HEIGHT: 64 IN | BODY MASS INDEX: 46.13 KG/M2 | SYSTOLIC BLOOD PRESSURE: 106 MMHG

## 2021-06-04 VITALS
SYSTOLIC BLOOD PRESSURE: 108 MMHG | HEIGHT: 64 IN | BODY MASS INDEX: 45.58 KG/M2 | DIASTOLIC BLOOD PRESSURE: 80 MMHG | HEART RATE: 76 BPM | WEIGHT: 267 LBS

## 2021-06-04 NOTE — PATIENT INSTRUCTIONS - HE
Summary of Your Rheumatology Visit    Next Appointment:  3 Months    Medications:    Please follow directives on pill bottle on how to take medication(s) provided.    Recommend trying Tylenol 500-1000 mg twice a day if necessary for pain relief.      Referrals:      Tests:     Please have labs and x-rays that were ordered performed.        Injections:        Other:

## 2021-06-04 NOTE — PROGRESS NOTES
Eloina Aj who presents today with a chief complaint of  Consult (pt states PAOLA+ multiple joints pain x 2 years )      Joint Pains: Yes, Mainly body aches   Location: Total body pain, sometimes hands, hips, knees  Onset: 2 years   Intensity: 5 /10  AM Stiffness: yes 3-5 Minutes  Alleviating/Aggravating Factors: none   Tolerating Meds:   Other:      ROS:    Patient denies having: persistent dry eyes, dry mouth, recurrent oral ulcers, patchy alopecia, +  Chronic eczema rashes, photosensitivity, history of psoriasis, active chest pain, active shortness of breath, active cough, active dysuria, history of kidney stones, active abdominal pain, active diarrhea, history of hematochezia, active dysphagia, history of peptic ulcer disease, history of HIV, tuberculosis, hepatitis B or C, Lyme disease, seizure history, raynaud's, active documented fevers, recent infections, difficulty sleeping or chronic unrefreshing sleep, involuntary weight loss, loss of appetite, + excessive fatigue,  + chronic anxiety and depression,  numbness and tingling [if yes, where], recurrent sinus infections, history of inflammatory eye diseases (such as uveitis, scleritis, iritis, etc).     Information gathered by medical assistant incorporated into this note, was reviewed and discussed with the patient.    Problem List:  There is no problem list on file for this patient.       PMH:   No past medical history on file.    Surgical History:  No past surgical history on file.    Family History:  No family history on file.    Social History:   reports that she has never smoked. She has never used smokeless tobacco. She reports previous alcohol use.    Allergies:  Allergies   Allergen Reactions     Hydroxyzine Palpitations     Sulfa (Sulfonamide Antibiotics) Rash and Swelling        Current Medications:  Current Outpatient Medications   Medication Sig Dispense Refill     cholecalciferol, vitamin D3, (VITAMIN D3 ORAL) Take 1,000 mg by mouth daily.    "    cyclobenzaprine (FLEXERIL) 5 MG tablet Take 5 mg by mouth as needed.       escitalopram oxalate (LEXAPRO) 20 MG tablet Take 20 mg by mouth once.       CANDE FE 1/20, 28, 1 mg-20 mcg (21)/75 mg (7) per tablet daily.       levothyroxine (SYNTHROID, LEVOTHROID) 75 MCG tablet Take 75 mcg by mouth once.       metFORMIN (GLUCOPHAGE) 500 MG tablet Take 1,000 mg by mouth once.       triamcinolone (KENALOG) 0.1 % cream Apply topically as needed.       No current facility-administered medications for this visit.            Physical Exam:  /80 (Patient Site: Right Arm, Patient Position: Sitting, Cuff Size: Adult Large)   Pulse 76   Ht 5' 4\" (1.626 m)   Wt (!) 267 lb (121.1 kg)   BMI 45.83 kg/m    General: A & O x 3 in NAD, overweight  HEENT: EOMI, Non injected/non icteric sclera, no oral lesions noted  Neck: Supple, no cervical LAD or thyromegaly noted  Derm: No malar rash, psoriatic lesions or nail pitting appreciated  CV: s1s2 with RRR, no rubs appreciated   Lungs: CTA B/L, no wheezing , rales or rhonci appreciated  GI: Soft, NT/ND, no rebound, no guarding noted, no hepatomegally appreciated  MS: Positive discomfort on bilateral CMC joints on palpation, no synovitis noted.  Passive flexion/extension of knees did not reproduce any pains, no synovitis noted.  Passive range of motion testing of hips did not reproduce any pains.  Positive discomfort along right trochanteric bursa region on palpation.  Some mild discomfort involving right greater than left iliotibial bands on palpation, not much tightness appreciated.  Greater than 11/18 myofascial tender points.  Otherwise patient demonstrated good passive/active ROM over other joints with no warmth, erythema, tenderness or synovitis noted over these joints.  Back: negative straight leg raising bilaterally, positive focal discomfort on palpating vertebral lumbar spine and SI joint regions bilaterally.  Neuro: 5/5 strength in upper and lower extremities b/l, good " "sensation b/l,  2+ bicep and patella reflexes b/l        Summary/Assessment:    Pleasant 34-year-old female presents with positive PAOLA and chronic \"total body aches\" going on for about 2 years, which includes some discomfort involving bilateral CMC joints, knees and lateral hips right greater than left.    Patient states that she had a lesion on her cervix that appeared inflammatory so an PAOLA was sent off which returned positive.    Patient's PAOLA titer is 1-160 titer with speckled pattern, from outside source.    Patient's mother has rheumatoid arthritis, osteoarthritis and fibromyalgia.    On exam today, no clear signs of synovitis appreciated.    Patient has some skin lesions, states he saw a dermatologist told to have eczema.    My clinical suspicion is that her arthralgias/myalgias appear to be more so mechanical nature.    Patient meets criteria for having fibromyalgia given chronic unrefreshing sleep, chronic fatigue, history anxiety/depression and greater than 11/18 myofascial tender points on exam.  This is a diagnosis of exclusion and sometimes may just be a secondary/superimposed condition.  Will therefore correlate with labs and x-rays sent off today.    May have component of right trochanteric bursitis.    Told at one point to have right greater than left iliotibial band syndrome.  Patient used to play hockey and the past (about 20 ago) told to have slight tear of iliotibial band, treated with PT.    Has occasional bilateral  low back pain with occasional bilateral sciatica involving lateral thighs to distal extremities.  Denies any trauma.    Patient has not tried taking any medications for current arthralgias as would rather avoid taking medications if possible.  Although has Flexeril 5 mg tablets on med list, has not been taking.    Please see below for management plan.      Pertinent rheumatology/past medical history (please refer to above for more detailed history):      Positive PAOLA (1-1 60 titer " with speckled pattern)    Family history for RA (mother)    Fibromyalgia    Chronic low back pain, with bilateral radiculopathy    Chronic knee pains    Right trochanteric bursitis    History of right greater than left iliotibial band syndrome (with history of right iliotibial band tear related to hockey about 20 years ago)    Overweight      Rheumatology medications provided/suggested:    Flexeril  Tylenol      Pertinent medication from other providers or from otc (please refer to above for more detailed med list):    Contraceptive      Pertinent medications already tried:           Pertinent lab history:    Positive PAOLA (1-160 titer, speckled pattern)    Negative/unremarkable: Rheumatoid factor, CCP antibody, ESR      Pertinent imaging/test history:          Other:    , has 2 children, is a housewife.    Plan:      We will add Flexeril to act as a muscle relaxant hopefully improve her sleep.    For arthralgias/myalgias, suggest paient take over-the-counter Tylenol 500-1000 mg twice daily as needed for pain relief.    Deferred adding Voltaren gel for CMC pains.    Will obtain x-rays of: Lumbar spine, SI joints and bilateral knees.    Obtain some labs today correlate clinically.    Follow-up in 3 months.      Procedure note:              Spent 60 minutes with greater than half of this time spent with the patient going over differential diagnosis, prognosis, treatment plan, medication side effects and  answering questions.    Major side effect profile of medications provided/suggested were discussed with the patient.    This note was transcribed using Dragon voice recognition software as a result unintentional grammatical errors or word substitutions may have occurred. Please contact our Rheumatology department if you need any clarification or if you have any related inquiries.    Thank you for referring this patient to our clinic.      BILL ALEMAN DO   ...................  12/23/2019   11:32 AM

## 2021-06-05 NOTE — PROGRESS NOTES
Eloina Aj who presents today with a chief complaint of  Follow-up      Joint Pains: Yes  Location: RT hip and lower back   Onset: chronic couple years   Intensity:  6 /10  AM Stiffness: yes, 0 Minutes  Alleviating/Aggravating Factors: yes Medications helpful  Tolerating Meds: Yes tolerate med.   Other:      ROS:  Patient denies having any active: chest pain, shortness of breath, cough, abdominal pain, nausea, vomiting, rashes, documented fevers, oral ulcers and recent infections.+Patient admits to having a good appetite.  Information gathered by medical assistant incorporated into this note, was reviewed and discussed with the patient.    Problem List:  There is no problem list on file for this patient.       PMH:   No past medical history on file.    Surgical History:  No past surgical history on file.    Family History:  No family history on file.    Social History:   reports that she has never smoked. She has never used smokeless tobacco. She reports previous alcohol use.    Allergies:  Allergies   Allergen Reactions     Hydroxyzine Palpitations     Sulfa (Sulfonamide Antibiotics) Rash and Swelling        Current Medications:  Current Outpatient Medications   Medication Sig Dispense Refill     acetaminophen (TYLENOL ORAL) Take 1,000 mg by mouth as needed.       cholecalciferol, vitamin D3, (VITAMIN D3 ORAL) Take 1,000 mg by mouth daily.       cyclobenzaprine (FLEXERIL) 10 MG tablet Take 1 tab p.o. qhs (if feeling groggy the following morning, can try taking half a tablet instead or can take earlier the night before). 30 tablet 3     escitalopram oxalate (LEXAPRO) 20 MG tablet Take 20 mg by mouth daily.        CANDE FE 1/20, 28, 1 mg-20 mcg (21)/75 mg (7) per tablet daily.       levothyroxine (SYNTHROID, LEVOTHROID) 75 MCG tablet Take 75 mcg by mouth once.       metFORMIN (GLUCOPHAGE) 500 MG tablet Take 1,000 mg by mouth daily.        triamcinolone (KENALOG) 0.1 % cream Apply topically as needed.       No  "current facility-administered medications for this visit.            Physical Exam:  /66   Pulse 64   Ht 5' 4\" (1.626 m)   Wt (!) 270 lb 3.2 oz (122.6 kg)   BMI 46.38 kg/m    General: A & O x 3 in NAD  HEENT: EOMI, Non injected/non icteric sclera, no oral lesions noted  CV: s1s2 with RRR, no rubs appreciated   Lungs: CTA B/L, no wheezing , rales or rhonci appreciated  GI: Soft, NT/ND, no rebound, no guarding noted  MS: Positive discomfort involving right trochanteric bursa region on palpation/passive range of motion testing.  Positive discomfort involving vertebral/paravertebral lumbar spine regions and over lumbar triangle tendon insertion sites.  Otherwise patient demonstrated good passive/active ROM over other joints with no warmth, erythema, tenderness or synovitis noted over these joints.      Summary/Assessment:    History that includes positive PAOLA and arthralgias/myalgias.    Presents for follow-up visit.    Complaining of right lateral hip pains, likely secondary to trochanteric bursitis.    Also has chronic low back pains, has some signs of DJD on x-rays.  SI joints were unremarkable.  Also has component of spinal enthesopathy.    Flexeril has been helpful, takes nightly.    Takes Tylenol as needed for pain, provides relief.    Noted to have positive lupus anticoagulant.  Denies history of blood clots.  Denies history of miscarriages.  Has 2 healthy children, non-complicated pregnancies.    Noted to have elevated CRP (a bit higher than prior level) perhaps related to abnormal urinalysis showing some strep B along with elevated WBCs (normal RBC and protein).  Normal urine microalbumin.  Normal sed rate.    No clear signs of synovitis noted exam today.    Please see below for management plan.      From prior note: Presented on initial visit with   positive PAOLA and chronic \"total body aches\" going on for about 2 years, which includes some discomfort involving bilateral CMC joints, knees and lateral " hips right greater than left.    Patient states that she had a lesion on her cervix that appeared inflammatory so an PAOLA was sent off which returned positive.    Patient's PAOLA titer is 1-160 titer with speckled pattern, from outside source.    Patient's mother has rheumatoid arthritis, osteoarthritis and fibromyalgia.    My clinical suspicion is that her arthralgias/myalgias appear to be more so mechanical nature.    Patient meets criteria for having fibromyalgia.    Told at one point to have right greater than left iliotibial band syndrome.  Patient used to play hockey and the past (about 20 ago) told to have slight tear of iliotibial band, treated with PT.    Has occasional bilateral  low back pain with occasional bilateral sciatica involving lateral thighs to distal extremities.  Denies any trauma.      Pertinent rheumatology/past medical history (please refer to above for more detailed history):      Positive PAOLA (1-1 60 titer with speckled pattern)    Positive lupus anticoagulant    Family history for RA (mother)    Fibromyalgia    Chronic low back pain, with bilateral radiculopathy    Spinal enthesopathy    Chronic knee pains    Right trochanteric bursitis    History of right greater than left iliotibial band syndrome (with history of right iliotibial band tear related to hockey about 20 years ago)    Overweight      Rheumatology medications provided/suggested:    Flexeril  Tylenol      Pertinent medication from other providers or from otc (please refer to above for more detailed med list):    Contraceptive      Pertinent medications already tried:           Pertinent lab history:    Positive PAOLA (1-160 titer, speckled pattern), lupus anticoagulant    Negative/unremarkable: Rheumatoid factor, CCP antibody, ESR, PAOLA related subsets, HLA-B27, urine microalbumin, CBC      Pertinent imaging/test history:      X-rays of lumbar spine show some signs of DJD     x-rays of SI joints were unremarkable.    X-rays of  knees were reported to be unremarkable however, upon reviewing images with patient may have some early DJD involving medial components.    Other:    , has 2 children, is a housewife.    On prior visit, deferred adding Voltaren gel for CMC pains.  Plan:      Continue Flexeril nightly.    For arthralgias/myalgias, continue Tylenol 500-1000 mg twice daily as needed for pain relief.    Will inject right trochanteric bursa with cortisone today.    Recommend following up with PCP regarding cloudy urine and abnormal urinalysis (colonies of strep B with elevated WBC).    Given positive PAOLA and positive family history for RA will continue to monitor for any signs and symptoms consistent with having connective tissue disease and manage accordingly.    Made aware that if low back pains persist or worsen she can contact us for possible cortisone injection.  Deferred referral to PT.    Recheck lupus anticoagulant in late March 2020    Follow-up in 4 months.      Procedure note:     Consent to treat form signed by the patient.  Patient's right trochanteric bursa region was prepped in a sterile manner with an alcohol swab and ChloraPrep applicator.  Injected Medrol 40 mg 1:1 with lidocaine into right trochanteric bursa region.  Patient tolerated the procedure well with no complications noted.  Patient was advised to place ice over injection sites if sore over the next 24-48 hours. Margret WHALEN assisted with procedure.      Spent 40 minutes with greater than half of this time spent with the patient going over differential diagnosis, prognosis, treatment plan, medication side effects and  answering questions.      Above time noted, does not include time involved with procedure(s).      This note was transcribed using Dragon voice recognition software as a result unintentional grammatical errors or word substitutions may have occurred. Please contact our Rheumatology department if you need any clarification or if you have any  related inquiries.    Major side effect profile of medications provided were discussed with the patient.      Joe Bloom DO....................  2/5/2020   10:09 AM

## 2021-06-05 NOTE — TELEPHONE ENCOUNTER
I called and relayed MD msg below to pt. Lab appt scheduled and order placed.      Per pt said is not on any anticoagulation med and no hx of blood clot. Never has a miscarriage.

## 2021-06-05 NOTE — PATIENT INSTRUCTIONS - HE
Summary of Your Rheumatology Visit    Next Appointment:  4 Months    Medications:    Continue medications provided, as discussed.      Referrals:      Tests:     Recheck lupus anticoagulant after March 23, 2020     Injections:        Other:    Please contact your PCP to discuss abnormal urinalysis lab results along with symptoms of cloudy urine.

## 2021-06-05 NOTE — TELEPHONE ENCOUNTER
----- Message from Joe Bloom DO sent at 1/7/2020 12:50 PM CST -----  Lupus anticoagulation test returned positive.  When this occurs, we recheck this level again after 12 weeks to see if truly positive.  If truly positive, a patient is a bit more at risk compared to the average population for developing blood clots and we would then recommend taking a baby aspirin daily.  If patient has a history of blood clots then patient may require anticoagulation medication (we would then refer to hematology).      Please inquire:  If patient is currently taking any anticoagulation medication or has a history of blood clots?      Has patient had any miscarriages?  If yes, how many and in what trimester(s)?      Patient stated on previous visit that she is currently taking a contraceptive.  If she plans on having additional children, she should make us aware.  If second lupus anticoagulation test returns positive and plans on having additional children then recommend she become established with a high risk obstetrician.    Although this test is called lupus anticoagulant, it does not mean that when positive, patient has diagnosis of lupus (although  patients with lupus are more prone to having this test return positive).    CRP (C-reactive protein), a nonspecific inflammatory marker, was mildly elevated.  This lab test needs to be correlated clinically.  Current level not worrisome, particularly since patient had in the past (10/10/2019) a normal sedimentation rate, which also is a nonspecific inflammatory marker.    Recommend rechecking ESR/CRP levels a few days prior to follow-up visit.    Otherwise remainder of lab results were within normal limits.

## 2021-06-09 NOTE — TELEPHONE ENCOUNTER
No Show multiple attempt  6/224/2020 1:35pm, 2pm, 2:10, 2:22 lvm. If pt call us back please rescheduled

## 2021-06-16 ENCOUNTER — TELEPHONE (OUTPATIENT)
Dept: PEDIATRICS | Facility: CLINIC | Age: 36
End: 2021-06-16

## 2021-06-16 NOTE — TELEPHONE ENCOUNTER
Patient Quality Outreach      Summary:    Patient has the following on her problem list/HM:     Depression / Dysthymia review      12 Month Remission: 10-14 month window range: 04/20-08/18/21       PHQ-9 SCORE 6/19/2020 7/17/2020 3/4/2021   PHQ-9 Total Score MyChart 17 (Moderately severe depression) - 13 (Moderate depression)   PHQ-9 Total Score 16 11 13       If PHQ-9 recheck is 5 or more, route to provider for next steps.    Patient is due/failing the following:   Cervical Cancer Screening - PAP Needed and PHQ-9 Needed    Type of outreach:    Sent [employer] message.    Questions for provider review:    None                                                                                                                                     JHONNY JULIO MA on 6/16/2021 at 11:23 AM

## 2021-06-20 NOTE — LETTER
Letter by Joe Bloom DO at      Author: Joe Bloom DO Service: -- Author Type: --    Filed:  Encounter Date: 1/14/2020 Status: Signed         Eloina Aj  [id number] [facility] [address]             January 14, 2020         Dear Ms. Aj,    Below are the results from your recent visit:    Resulted Orders   XR Lumbar Spine 2 or 3 VWS    Narrative    EXAM: XR LUMBAR SPINE 2 OR 3 VWS, XR SACROILIAC JOINTS 3 OR MORE VWS  LOCATION: [facility]  DATE/TIME: 12/23/2019 1:29 PM    INDICATION: Lumbago with sciatica, left side  COMPARISON: None.      Impression    Vertebral body heights are maintained. No bony ankylosis. There is mild bony proliferative change along the anterosuperior endplates of L2 through L4, probably representing early degenerative change. Alignment of the lumbar spine is within   normal limits.    SI joint spaces are normal. No erosions, joint space narrowing or bony ankylosis. No evidence of an inflammatory arthropathy.     Degenerative findings mentioned are consistent with having signs of mild Osteoarthritis. Osteoarthritis represents gradual wearing down with time of the protective cartilage in a joint.     Please call with questions or contact us using [employer].    Sincerely,        Electronically signed by Joe Bloom DO

## 2021-07-03 NOTE — ADDENDUM NOTE
Addendum Note by Marielle Godwin at 12/23/2019 11:00 AM     Author: Marielle Godwin Service: -- Author Type:     Filed: 12/30/2019  3:41 PM Encounter Date: 12/23/2019 Status: Signed    : Marielle Godwin ()    Addended by: MARIELLE GODWIN on: 12/30/2019 03:41 PM        Modules accepted: Orders

## 2021-07-18 ENCOUNTER — MYC MEDICAL ADVICE (OUTPATIENT)
Dept: PEDIATRICS | Facility: CLINIC | Age: 36
End: 2021-07-18

## 2021-07-19 ENCOUNTER — TELEPHONE (OUTPATIENT)
Dept: SURGERY | Facility: CLINIC | Age: 36
End: 2021-07-19

## 2021-07-19 NOTE — TELEPHONE ENCOUNTER
S/p Laparoscopic hand-assisted excision/marsupialization of large splenic cyst.  Date: 5/12/21  Surgeon: Dr. Wilkes    Patient reports that she is having frequent severe heartburn and a feeling of fullness with nausea after eating.  No abdominal pain.    No firmness in left abdomen - abdomen is soft.      She has seen her PCP re: these symptoms and  PCP thought it would be a good idea to talk to Dr Wilkes re: her symptoms.      I discussed with Dr. Wilkes.  He feels there is a very low likelihood that the splenic cyst is reforming.  He would like PCP to order CT scan and if any findings related to cyst formation he would be happy to review the CT.      I called Eloina with the above information.  She will call PCP with 's recommendation and will call our office if any questions or problems getting CT ordered.

## 2021-07-19 NOTE — TELEPHONE ENCOUNTER
Name of caller: Eloina    Reason for Call:  Fullness and heartburn. Pt feels like the symptoms she had before are returning.    Surgeon:  Dr. Wilkes    Recent Surgery:  Yes.    If yes, when & what type:  5/12, Laparoscopic hand-assisted excision/marsupialization of large splenic cyst.      Best phone number to reach pt at is: [phone]  Ok to leave a message with medical info? Yes.    Pharmacy preferred (if calling for a refill): NA

## 2021-07-22 ENCOUNTER — OFFICE VISIT (OUTPATIENT)
Dept: PEDIATRICS | Facility: CLINIC | Age: 36
End: 2021-07-22
Payer: COMMERCIAL

## 2021-07-22 VITALS
RESPIRATION RATE: 18 BRPM | BODY MASS INDEX: 44.93 KG/M2 | OXYGEN SATURATION: 100 % | SYSTOLIC BLOOD PRESSURE: 100 MMHG | TEMPERATURE: 97.8 F | DIASTOLIC BLOOD PRESSURE: 68 MMHG | WEIGHT: 270 LBS | HEART RATE: 75 BPM

## 2021-07-22 DIAGNOSIS — R10.13 EPIGASTRIC PAIN: ICD-10-CM

## 2021-07-22 DIAGNOSIS — D73.4 CYST OF SPLEEN: Primary | ICD-10-CM

## 2021-07-22 DIAGNOSIS — K21.9 GASTROESOPHAGEAL REFLUX DISEASE WITHOUT ESOPHAGITIS: ICD-10-CM

## 2021-07-22 PROCEDURE — 99214 OFFICE O/P EST MOD 30 MIN: CPT | Performed by: PEDIATRICS

## 2021-07-22 RX ORDER — OMEPRAZOLE 40 MG/1
40 CAPSULE, DELAYED RELEASE ORAL DAILY
Qty: 90 CAPSULE | Refills: 0 | Status: SHIPPED | OUTPATIENT
Start: 2021-07-22 | End: 2022-07-15

## 2021-07-22 NOTE — PROGRESS NOTES
"    Assessment & Plan     Cyst of spleen  Patient with increasing abd fullness, but mainly symptoms of persistent GERD.  She does drink caffeine and triggers as below.  Will get imaging to ensure no external pressure from spleen/recurrent cyst.  See PI for other plans.  Has follow up with me in 6-7 weeks.   - CT Abdomen Pelvis w/o Contrast; Future    Epigastric pain  As above  - CT Abdomen Pelvis w/o Contrast; Future    Gastroesophageal reflux disease without esophagitis  As above  - omeprazole (PRILOSEC) 40 MG DR capsule; Take 1 capsule (40 mg) by mouth daily             BMI:   Estimated body mass index is 44.93 kg/m  as calculated from the following:    Height as of 6/3/21: 1.651 m (5' 5\").    Weight as of this encounter: 122.5 kg (270 lb).       Patient Instructions   1. You will be called to schedule CT.  If you don't hear from someone in 1-2 days, please call radiology department at [phone] to schedule your test at [facility].    2. Heartburn: take omeprazole 40mg daily for 6 weeks.  If helping, then send me a mychart to say you feel better and we would decrease to 20mg long term.  If not helping or only helping a little please tell me as well and we will order a upper endoscopy to look into your stomach.   (follow up at next appt).      Return for Appt already scheduled.    Cortney Silveira MD  [facility] JEAN MARIE Duvall is a 36 year old who presents for the following health issues     HPI     GERD/Heartburn  Onset/Duration: 2 months - graudally worsening since surgery (marsupilization of large splenic cyst)  Description:   Intensity: severe  Progression of Symptoms: same  Accompanying Signs & Symptoms:  Does it feel like food gets stuck or trouble swallowing: no  Nausea: YES  Vomiting (bloody?): no  Abdominal Pain: YES  Black-Tarry stools: no  Bloody stools: no  History:  Previous similar episodes: YES  Previous ulcers: no  Precipitating factors:   Caffeine use: " YES  Alcohol use: YES- occasionally   NSAID/Aspirin use: no  Tobacco use: no  Worse with fatty foods, spicy foods, caffeinated drinks, alcohol and peppermint  Alleviating factors: Tums  Therapies tried and outcome:             Lifestyle changes: None            Medications: antacids -     Caffeine - 2 cups in AM  ETOH - once per week  No medications NSAIDS or ASA    Patient has also been gaining weight over the past 2 months since the     Also worsening fatigue over the past month - son now sleeping in own bed.  No other changes.     Even without       Review of Systems   Constitutional, HEENT, cardiovascular, pulmonary, gi and gu systems are negative, except as otherwise noted.      Objective    /68 (BP Location: Right arm, Patient Position: Sitting, Cuff Size: Adult Large)   Pulse 75   Temp 97.8  F (36.6  C) (Tympanic)   Resp 18   Wt 122.5 kg (270 lb)   LMP 06/08/2021   SpO2 100%   Breastfeeding No   BMI 44.93 kg/m    Body mass index is 44.93 kg/m .  Physical Exam   GENERAL APPEARANCE: healthy, alert and no distress  ABDOMEN: soft, nontender, without hepatosplenomegaly or masses and bowel sounds normal

## 2021-07-22 NOTE — PATIENT INSTRUCTIONS
1. You will be called to schedule CT.  If you don't hear from someone in 1-2 days, please call radiology department at [phone] to schedule your test at [facility].    2. Heartburn: take omeprazole 40mg daily for 6 weeks.  If helping, then send me a mychart to say you feel better and we would decrease to 20mg long term.  If not helping or only helping a little please tell me as well and we will order a upper endoscopy to look into your stomach.   (follow up at next appt).

## 2021-07-26 ENCOUNTER — HOSPITAL ENCOUNTER (OUTPATIENT)
Dept: CT IMAGING | Facility: CLINIC | Age: 36
Discharge: HOME | End: 2021-07-26
Attending: PEDIATRICS | Admitting: PEDIATRICS
Payer: COMMERCIAL

## 2021-07-26 DIAGNOSIS — R10.13 EPIGASTRIC PAIN: ICD-10-CM

## 2021-07-26 DIAGNOSIS — D73.4 CYST OF SPLEEN: ICD-10-CM

## 2021-07-26 PROCEDURE — 74177 CT ABD & PELVIS W/CONTRAST: CPT

## 2021-07-26 PROCEDURE — 250N000009 HC RX 250: Performed by: PEDIATRICS

## 2021-07-26 PROCEDURE — 250N000011 HC RX IP 250 OP 636: Performed by: PEDIATRICS

## 2021-07-26 RX ORDER — IOPAMIDOL 755 MG/ML
500 INJECTION, SOLUTION INTRAVASCULAR ONCE
Status: COMPLETED | OUTPATIENT
Start: 2021-07-26 | End: 2021-07-26

## 2021-07-26 RX ADMIN — SODIUM CHLORIDE 65 ML: 9 INJECTION, SOLUTION INTRAVENOUS at 16:12

## 2021-07-26 RX ADMIN — IOPAMIDOL 100 ML: 755 INJECTION, SOLUTION INTRAVENOUS at 16:10

## 2021-09-01 ENCOUNTER — MYC MEDICAL ADVICE (OUTPATIENT)
Dept: PEDIATRICS | Facility: CLINIC | Age: 36
End: 2021-09-01

## 2021-09-01 NOTE — TELEPHONE ENCOUNTER
MA/TC-  Please contact pt to schedule visit to discuss fatigue, weight gain and possible depression. Anusha Gallo RN on 9/1/2021 at 4:06 PM

## 2021-09-13 ENCOUNTER — VIRTUAL VISIT (OUTPATIENT)
Dept: PEDIATRICS | Facility: CLINIC | Age: 36
End: 2021-09-13
Payer: COMMERCIAL

## 2021-09-13 DIAGNOSIS — F33.1 MAJOR DEPRESSIVE DISORDER, RECURRENT EPISODE, MODERATE (H): Primary | ICD-10-CM

## 2021-09-13 PROCEDURE — 99214 OFFICE O/P EST MOD 30 MIN: CPT | Mod: 95 | Performed by: PEDIATRICS

## 2021-09-13 RX ORDER — BUPROPION HYDROCHLORIDE 200 MG/1
200 TABLET, EXTENDED RELEASE ORAL 2 TIMES DAILY
Qty: 60 TABLET | Refills: 1 | Status: SHIPPED | OUTPATIENT
Start: 2021-09-13 | End: 2021-10-11

## 2021-09-13 ASSESSMENT — PATIENT HEALTH QUESTIONNAIRE - PHQ9
SUM OF ALL RESPONSES TO PHQ QUESTIONS 1-9: 17
SUM OF ALL RESPONSES TO PHQ QUESTIONS 1-9: 17
10. IF YOU CHECKED OFF ANY PROBLEMS, HOW DIFFICULT HAVE THESE PROBLEMS MADE IT FOR YOU TO DO YOUR WORK, TAKE CARE OF THINGS AT HOME, OR GET ALONG WITH OTHER PEOPLE: SOMEWHAT DIFFICULT

## 2021-09-13 NOTE — PATIENT INSTRUCTIONS
Dear Eloina,    It was nice to talk with you today.    Let's increase the Wellbutrin to 200mg twice daily.  Please continue with the Lexapro 30mg daily.    Also - make a goal to exercise 10 minutes on Monday and Wednesday.  Can be a brisk walk, your cardio machine or google a 10 min yoga video.    We will follow up in 4-6 with a video visit and I will also have the clinic help you schedule a lab only appt.    Take care,    Cortney Silveira MD  Internal Medicine/Pediatrics  [facility]

## 2021-09-13 NOTE — PROGRESS NOTES
"Eloina is a 36 year old who is being evaluated via a billable video visit.      How would you like to obtain your AVS? MyChart  If the video visit is dropped, the invitation should be resent by: Text to cell phone: [phone]  Will anyone else be joining your video visit? No    Video Start Time: 8:29am    Assessment & Plan     Major depressive disorder, recurrent episode, moderate (H)  Worsening - increase wellbutrin to max 200mg BID.  Follow-up in 4-6 weeks with VV.  If not improving could consider buspar as well, but would need to monitor closely for sertonin syndrome.  Offered therapy - patient declines - has not done much for her in the past - see PI - will also check TSH and Vit D.   - buPROPion (WELLBUTRIN SR) 200 MG 12 hr tablet; Take 1 tablet (200 mg) by mouth 2 times daily  - TSH with free T4 reflex; Future  - Vitamin D Deficiency; Future             BMI:   Estimated body mass index is 44.93 kg/m  as calculated from the following:    Height as of 6/3/21: 1.651 m (5' 5\").    Weight as of 7/22/21: 122.5 kg (270 lb).       Patient Instructions   Dear Eloina,    It was nice to talk with you today.    Let's increase the Wellbutrin to 200mg twice daily.  Please continue with the Lexapro 30mg daily.    Also - make a goal to exercise 10 minutes on Monday and Wednesday.  Can be a brisk walk, your cardio machine or google a 10 min yoga video.    We will follow up in 4-6 with a video visit and I will also have the clinic help you schedule a lab only appt.    Take care,    Cortney Silveira MD  Internal Medicine/Pediatrics  [facility]          Return in about 4 weeks (around 10/11/2021) for Follow up, using a video visit, Depression/Anxiety.    Cortney Silveira MD  [facility] JEAN MARIE    Phyllis Duvall is a 36 year old who presents for the following health issues     HPI Answers for HPI/ROS submitted by the patient on 9/13/2021  If you checked off any problems, how difficult have these " problems made it for you to do your work, take care of things at home, or get along with other people?: Somewhat difficult  PHQ9 TOTAL SCORE: 17  How many servings of fruits and vegetables do you eat daily?: 0-1  On average, how many sweetened beverages do you drink each day (Examples: soda, juice, sweet tea, etc.  Do NOT count diet or artificially sweetened beverages)?: 1  How many minutes a day do you exercise enough to make your heart beat faster?: 9 or less  How many days a week do you exercise enough to make your heart beat faster?: 3 or less  How many days per week do you miss taking your medication?: 0        Depression and Anxiety Follow-Up    How are you doing with your depression since your last visit? Worsened     How are you doing with your anxiety since your last visit?  Worsened     Are you having other symptoms that might be associated with depression or anxiety? No    Have you had a significant life event? No     Do you have any concerns with your use of alcohol or other drugs? 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  Anxiety about getting there on time is.  Patient is not as happy anymore, more short tempered, mom has noticed.  Losing interest in things, would rather not do anything, super tired all the time.  Can't pay attention.     Currently taking Lexapro 30mg and Wll    Son is 3 - so only half days on M and W - tired cardio machine last week.     Heartburn follow up - at last visit 7/22 - on omeprazole -     Social History     Tobacco Use     Smoking status: Never Smoker     Smokeless tobacco: Never Used   Substance Use Topics     Alcohol use: No     Drug use: No     PHQ 7/17/2020 3/4/2021 9/13/2021   PHQ-9 Total Score 11 13 17   Q9: Thoughts of better off dead/self-harm past 2 weeks Not at all Not at all Not at all     SHARITA-7 SCORE 4/17/2020 6/19/2020 3/4/2021   Total Score - 7 (mild anxiety) 10 (moderate anxiety)   Total Score 10 7  10         Suicide Assessment Five-step Evaluation and Treatment (SAFE-T)      Review of Systems         Objective           Vitals:  No vitals were obtained today due to virtual visit.    Physical Exam   GENERAL: Healthy, alert and no distress  EYES: Eyes grossly normal to inspection.  No discharge or erythema, or obvious scleral/conjunctival abnormalities.  RESP: No audible wheeze, cough, or visible cyanosis.  No visible retractions or increased work of breathing.    SKIN: Visible skin clear. No significant rash, abnormal pigmentation or lesions.  NEURO: Cranial nerves grossly intact.  Mentation and speech appropriate for age.  PSYCH: Mentation appears normal, affect normal/bright, judgement and insight intact, normal speech and appearance well-groomed.                Video-Visit Details    Type of service:  Video Visit    Video End Time:8:43am    Originating Location (pt. Location): Home    Distant Location (provider location):  [facility] JEAN MAR[place]     Platform used for Video Visit: [employer]

## 2021-09-14 ASSESSMENT — PATIENT HEALTH QUESTIONNAIRE - PHQ9: SUM OF ALL RESPONSES TO PHQ QUESTIONS 1-9: 17

## 2021-09-22 ENCOUNTER — LAB (OUTPATIENT)
Dept: LAB | Facility: CLINIC | Age: 36
End: 2021-09-22
Payer: COMMERCIAL

## 2021-09-22 DIAGNOSIS — F33.1 MAJOR DEPRESSIVE DISORDER, RECURRENT EPISODE, MODERATE (H): ICD-10-CM

## 2021-09-22 LAB
DEPRECATED CALCIDIOL+CALCIFEROL SERPL-MC: 42 UG/L (ref 20–75)
TSH SERPL DL<=0.005 MIU/L-ACNC: 3.33 MU/L (ref 0.4–4)

## 2021-09-22 PROCEDURE — 84443 ASSAY THYROID STIM HORMONE: CPT

## 2021-09-22 PROCEDURE — 36415 COLL VENOUS BLD VENIPUNCTURE: CPT

## 2021-09-22 PROCEDURE — 82306 VITAMIN D 25 HYDROXY: CPT

## 2021-10-10 ASSESSMENT — PATIENT HEALTH QUESTIONNAIRE - PHQ9
SUM OF ALL RESPONSES TO PHQ QUESTIONS 1-9: 20
SUM OF ALL RESPONSES TO PHQ QUESTIONS 1-9: 20
10. IF YOU CHECKED OFF ANY PROBLEMS, HOW DIFFICULT HAVE THESE PROBLEMS MADE IT FOR YOU TO DO YOUR WORK, TAKE CARE OF THINGS AT HOME, OR GET ALONG WITH OTHER PEOPLE: NOT DIFFICULT AT ALL

## 2021-10-11 ENCOUNTER — VIRTUAL VISIT (OUTPATIENT)
Dept: PEDIATRICS | Facility: CLINIC | Age: 36
End: 2021-10-11
Payer: COMMERCIAL

## 2021-10-11 ENCOUNTER — TELEPHONE (OUTPATIENT)
Dept: PEDIATRICS | Facility: CLINIC | Age: 36
End: 2021-10-11

## 2021-10-11 DIAGNOSIS — F41.9 ANXIETY: Primary | ICD-10-CM

## 2021-10-11 DIAGNOSIS — F33.1 MAJOR DEPRESSIVE DISORDER, RECURRENT EPISODE, MODERATE (H): ICD-10-CM

## 2021-10-11 PROCEDURE — 99213 OFFICE O/P EST LOW 20 MIN: CPT | Mod: 95 | Performed by: PEDIATRICS

## 2021-10-11 RX ORDER — BUPROPION HYDROCHLORIDE 200 MG/1
200 TABLET, EXTENDED RELEASE ORAL 2 TIMES DAILY
Qty: 60 TABLET | Refills: 4 | Status: SHIPPED | OUTPATIENT
Start: 2021-10-11 | End: 2022-01-06

## 2021-10-11 ASSESSMENT — PATIENT HEALTH QUESTIONNAIRE - PHQ9: SUM OF ALL RESPONSES TO PHQ QUESTIONS 1-9: 20

## 2021-10-11 NOTE — TELEPHONE ENCOUNTER
"LVM for patient to relay the following per Dr. Silveira - \"  Return in about 3 months (around 1/11/2022) for Follow up, with me, using a video visit, Depression/Anxiety. \"  Krys Hyman, [facility] on 10/11/2021 at 8:56 AM   "

## 2021-10-11 NOTE — PATIENT INSTRUCTIONS
"Roel Duvall,    It was nice to see you today!    I'm glad things are starting to slow down.      We will keep the medications the same for now.    Please try to walk/exercise 10-15 min a few times per week (sometimes motivating yourself with a nap can be helpful \"If I walk around the block, then I can lay down for 20min...eventually you might find that you have more energy)    We will check in again in 3 months with a video visit.    Take care,    Cortney Silveira MD  Internal Medicine/Pediatrics  [facility]      "

## 2021-10-11 NOTE — PROGRESS NOTES
"Eloina is a 36 year old who is being evaluated via a billable video visit.      How would you like to obtain your AVS? MyChart  If the video visit is dropped, the invitation should be resent by: Text to cell phone: [phone]   Will anyone else be joining your video visit? No      Video Start Time: 8:30am    Assessment & Plan     Major depressive disorder, recurrent episode, moderate (H)  Improving - will keep medications the same, monitor for seratonin syndrome.    - buPROPion (WELLBUTRIN SR) 200 MG 12 hr tablet; Take 1 tablet (200 mg) by mouth 2 times daily    Anxiety  Still not optimal - discussed buspar, but will hold off for now as kids sports slowing down. Could consider buspar in the future.  FOLLOW UP in 3 months.                BMI:   Estimated body mass index is 44.93 kg/m  as calculated from the following:    Height as of 6/3/21: 1.651 m (5' 5\").    Weight as of 7/22/21: 122.5 kg (270 lb).       Patient Instructions   Hi Eloina,    It was nice to see you today!    I'm glad things are starting to slow down.      We will keep the medications the same for now.    Please try to walk/exercise 10-15 min a few times per week (sometimes motivating yourself with a nap can be helpful \"If I walk around the block, then I can lay down for 20min...eventually you might find that you have more energy)    We will check in again in 3 months with a video visit.    Take care,    Cortney Silveira MD  Internal Medicine/Pediatrics  [facility]          Return in about 3 months (around 1/11/2022) for Follow up, with me, using a video visit, Depression/Anxiety.    Cortney Silveira MD  [facility] JEAN MARIE    Subjective   Eloina is a 36 year old who presents for the following health issues     History of Present Illness       She eats 2-3 servings of fruits and vegetables daily.She consumes 1 sweetened beverage(s) daily.She exercises with enough effort to increase her heart rate 9 or less minutes per day.  " "She exercises with enough effort to increase her heart rate 3 or less days per week.   She is taking medications regularly.       Depression and Anxiety Follow-Up    How are you doing with your depression since your last visit? Improved    How are you doing with your anxiety since your last visit?  No change    Are you having other symptoms that might be associated with depression or anxiety? No    Have you had a significant life event? No     Do you have any concerns with your use of alcohol or other drugs? No     Last visit 9/13/21: We increased wellbutrin to 200mg BID - since then \"pretty good\" - has noticed a little bit of a change - anxiety is still pretty high.  Daughter in a lot of sports and this was stressful - starting to slow down. Anxiety is the worst part. Not getting as angry - used to feel like she failed every day.     Son goes to school on M/W in AM - hoping to do 10-15min a few times.     Social History     Tobacco Use     Smoking status: Never Smoker     Smokeless tobacco: Never Used   Substance Use Topics     Alcohol use: No     Drug use: No     PHQ 3/4/2021 9/13/2021 10/10/2021   PHQ-9 Total Score 13 17 20   Q9: Thoughts of better off dead/self-harm past 2 weeks Not at all Not at all Not at all     SHARITA-7 SCORE 4/17/2020 6/19/2020 3/4/2021   Total Score - 7 (mild anxiety) 10 (moderate anxiety)   Total Score 10 7 10     Last PHQ-9 10/10/2021   1.  Little interest or pleasure in doing things 3   2.  Feeling down, depressed, or hopeless 3   3.  Trouble falling or staying asleep, or sleeping too much 3   4.  Feeling tired or having little energy 3   5.  Poor appetite or overeating 3   6.  Feeling bad about yourself 2   7.  Trouble concentrating 3   8.  Moving slowly or restless 0   Q9: Thoughts of better off dead/self-harm past 2 weeks 0   PHQ-9 Total Score 20   Difficulty at work, home, or with people -     SHARITA-7  3/4/2021   1. Feeling nervous, anxious, or on edge 2   2. Not being able to stop or " control worrying 1   3. Worrying too much about different things 1   4. Trouble relaxing 3   5. Being so restless that it is hard to sit still 0   6. Becoming easily annoyed or irritable 3   7. Feeling afraid, as if something awful might happen 0   SHARITA-7 Total Score 10   If you checked any problems, how difficult have they made it for you to do your work, take care of things at home, or get along with other people? -       Suicide Assessment Five-step Evaluation and Treatment (SAFE-T)          Review of Systems         Objective           Vitals:  No vitals were obtained today due to virtual visit.    Physical Exam   GENERAL: Healthy, alert and no distress  EYES: Eyes grossly normal to inspection.  No discharge or erythema, or obvious scleral/conjunctival abnormalities.  RESP: No audible wheeze, cough, or visible cyanosis.  No visible retractions or increased work of breathing.    SKIN: Visible skin clear. No significant rash, abnormal pigmentation or lesions.  NEURO: Cranial nerves grossly intact.  Mentation and speech appropriate for age.  PSYCH: Mentation appears normal, affect normal/bright, judgement and insight intact, normal speech and appearance well-groomed.                Video-Visit Details    Type of service:  Video Visit    Video End Time:8:39am    Originating Location (pt. Location): Home    Distant Location (provider location):  [facility] [place]     Platform used for Video Visit: [email or web address]  Answers for HPI/ROS submitted by the patient on 10/10/2021  If you checked off any problems, how difficult have these problems made it for you to do your work, take care of things at home, or get along with other people?: Not difficult at all  PHQ9 TOTAL SCORE: 20

## 2021-10-23 ENCOUNTER — HEALTH MAINTENANCE LETTER (OUTPATIENT)
Age: 36
End: 2021-10-23

## 2021-11-11 ENCOUNTER — MYC MEDICAL ADVICE (OUTPATIENT)
Dept: PEDIATRICS | Facility: CLINIC | Age: 36
End: 2021-11-11
Payer: COMMERCIAL

## 2021-11-11 DIAGNOSIS — J35.8 TONSIL STONE: Primary | ICD-10-CM

## 2021-11-18 ENCOUNTER — MYC MEDICAL ADVICE (OUTPATIENT)
Dept: PEDIATRICS | Facility: CLINIC | Age: 36
End: 2021-11-18
Payer: COMMERCIAL

## 2021-11-23 ENCOUNTER — OFFICE VISIT (OUTPATIENT)
Dept: URGENT CARE | Facility: URGENT CARE | Age: 36
End: 2021-11-23
Payer: COMMERCIAL

## 2021-11-23 VITALS
SYSTOLIC BLOOD PRESSURE: 114 MMHG | OXYGEN SATURATION: 99 % | TEMPERATURE: 98.2 F | DIASTOLIC BLOOD PRESSURE: 74 MMHG | HEART RATE: 71 BPM

## 2021-11-23 DIAGNOSIS — J01.90 ACUTE SINUSITIS WITH SYMPTOMS > 10 DAYS: ICD-10-CM

## 2021-11-23 DIAGNOSIS — R05.9 COUGH: ICD-10-CM

## 2021-11-23 DIAGNOSIS — Z20.822 PERSON UNDER INVESTIGATION FOR COVID-19: Primary | ICD-10-CM

## 2021-11-23 PROCEDURE — U0005 INFEC AGEN DETEC AMPLI PROBE: HCPCS | Performed by: PHYSICIAN ASSISTANT

## 2021-11-23 PROCEDURE — 99214 OFFICE O/P EST MOD 30 MIN: CPT | Performed by: PHYSICIAN ASSISTANT

## 2021-11-23 PROCEDURE — U0003 INFECTIOUS AGENT DETECTION BY NUCLEIC ACID (DNA OR RNA); SEVERE ACUTE RESPIRATORY SYNDROME CORONAVIRUS 2 (SARS-COV-2) (CORONAVIRUS DISEASE [COVID-19]), AMPLIFIED PROBE TECHNIQUE, MAKING USE OF HIGH THROUGHPUT TECHNOLOGIES AS DESCRIBED BY CMS-2020-01-R: HCPCS | Performed by: PHYSICIAN ASSISTANT

## 2021-11-23 RX ORDER — ALBUTEROL SULFATE 90 UG/1
2 AEROSOL, METERED RESPIRATORY (INHALATION)
Qty: 8.5 G | Refills: 0 | Status: SHIPPED | OUTPATIENT
Start: 2021-11-23 | End: 2022-07-15

## 2021-11-23 NOTE — PROGRESS NOTES
SUBJECTIVE:   Eloina Aj is a 36 year old female presenting with a chief complaint of productive cough for 1 week to 10 days  Course of illness is worsening.    Severity moderate  Current and Associated symptoms: runny nose, stuffy nose, cough - non-productive, cough - productive, sore throat, facial pain/pressure and headache  Treatment measures tried include sudafed, mucinex, robitussin children  Predisposing factors include None.    Past Medical History:   Diagnosis Date     Anorexia     From age [age].     Chronic depressive personality disorder     hx of anxiety and depression     Depressive disorder 1997     Gastroesophageal reflux disease      PCOS (polycystic ovarian syndrome)      Thyroid disease      Tympanosclerosis, unspecified as to involvement      Current Outpatient Medications   Medication Sig Dispense Refill     buPROPion (WELLBUTRIN SR) 200 MG 12 hr tablet Take 1 tablet (200 mg) by mouth 2 times daily 60 tablet 4     escitalopram (LEXAPRO) 20 MG tablet TAKE 1 AND 1/2 TABLETS BY MOUTH DAILY 45 tablet 6     JUNEL FE 1/20 1-20 MG-MCG per tablet Take 1 tablet by mouth daily (Patient not taking: Reported on 7/22/2021)  3     levothyroxine (SYNTHROID/LEVOTHROID) 75 MCG tablet Take 1 tablet (75 mcg) by mouth daily 30 tablet 11     metFORMIN (GLUCOPHAGE) 500 MG tablet Take 1 tablet (500 mg) by mouth 2 times daily (with meals) Office visit needed prior to additional refills. 60 tablet 11     omeprazole (PRILOSEC) 40 MG DR capsule Take 1 capsule (40 mg) by mouth daily 90 capsule 0     triamcinolone (KENALOG) 0.1 % external cream Apply topically 3 times daily as needed (psoriasis) 30 g 1     Vitamin D, Cholecalciferol, 25 MCG (1000 UT) TABS Take 1 tablet by mouth daily       Social History     Tobacco Use     Smoking status: Never Smoker     Smokeless tobacco: Never Used   Substance Use Topics     Alcohol use: No       ROS:  Review of systems negative except as stated above.    OBJECTIVE:  /74    Pulse 71   Temp 98.2  F (36.8  C) (Tympanic)   SpO2 99%   GENERAL APPEARANCE: healthy, alert and no distress  EYES: EOMI,  PERRL, conjunctiva clear  HENT: ear canals and TM's normal.  Nose and mouth without ulcers, erythema or lesions  NECK: supple, nontender, no lymphadenopathy  RESP: lungs clear to auscultation - no rales, rhonchi or wheezes  CV: regular rates and rhythm, normal S1 S2, no murmur noted  NEURO: Normal strength and tone, sensory exam grossly normal,  normal speech and mentation  SKIN: no suspicious lesions or rashes      No results found for any visits on 11/23/21.    ASSESSMENT:  (Z20.822) Person under investigation for COVID-19  (primary encounter diagnosis)  (R05.9) Cough  Plan: Symptomatic COVID-19 Virus (Coronavirus) by PCR        Nose, albuterol (PROAIR HFA/PROVENTIL         HFA/VENTOLIN HFA) 108 (90 Base) MCG/ACT inhaler      (J01.90) Acute sinusitis with symptoms > 10 days  Plan: amoxicillin-clavulanate (AUGMENTIN) 875-125 MG         tablet     /jwsars      Covid-19  Pt was evaluated during a global COVID-19 pandemic, which necessitated consideration that the patient might be at risk for infection with the SARS-CoV-2 virus that causes COVID-19.   Applicable protocols for evaluation were followed during the patient's care.   COVID-19 was considered as part of the patient's evaluation. The plan for testing is:  a test was ordered during this visit.    No severe headache, chest pain, shortness of breath  No additional infectious symptoms  Rest, isolate for 10 days, hydrate, test, follow up if worsening or new symptoms  HH members to isolate until test results, if positive isolate for 2 weeks and follow up for testing if symptoms occur  Red flags and emergent follow up discussed, and understood by patient  Follow up with PCP if symptoms worsen or fail to improve    Surgical mask, gown, shield, hairnet, gloves worn by provider    Patient Instructions     Covid-19    Follow up immediately with  severe headache, chest pain, or shortness of breath    Rest, isolate for 10 days, hydrate, follow up if worsening or new symptoms  Household members to isolate until test results, if positive isolate for 2 weeks and follow up for testing if symptoms occur         Patient Education     Coronavirus Disease 2019 (COVID-19): Caring for Yourself or Others   If you or a household member have symptoms of COVID-19, follow the guidelines below. This will help you manage symptoms and keep the virus from spreading.  If you have symptoms of COVID-19    Stay home and contact your care team. They will tell you what to do.    Don t panic. Keep in mind that other illnesses can cause similar symptoms.    Stay away from work, school, and public places.    Limit physical contact with others in your home. Limit visitors. No kissing.  Clean surfaces you touch with disinfectant.  If you need to cough or sneeze, do it into a tissue. Then throw the tissue into the trash. If you don't have tissues, cough or sneeze into the bend of your elbow.  Don t share food or personal items with people in your household. This includes items like eating and drinking utensils, towels, and bedding.  Wear a cloth face mask around other people. During a public health emergency, medical face masks may be reserved for healthcare workers. You may need to make a cloth face mask of your own. You can do this using a bandana, T-shirt, or other cloth. The CDC has instructions on how to make a face mask. Wear the mask so that it covers both your nose and mouth.  If you need to go to a hospital or clinic, call ahead to let them know. Expect the care team to wear masks, gowns, gloves, and eye protection. You may need to come to a different entrance or wait in a separate room.  Follow all instructions from your care team.    If you ve been diagnosed with COVID-19    Stay home and away from others, including other people in your home. (This is called self-isolation.)  Don t leave home unless you need to get medical care. Don t go to work, school, or public places. Don t use buses, taxis, or other public transportation.    Follow all instructions from your care team.    If you need to go to a hospital or clinic, call ahead to let them know. Expect the care team to wear masks, gowns, gloves, and eye protection. You may need to come to a different entrance or wait in a separate room.    Wear a face mask over your nose and mouth. This is to protect others from your germs. If you can t wear a mask, your caregivers should wear one. You may need to make your own mask using a bandana, T-shirt, or other cloth. See the CDC s instructions on how to make a face mask.    Have no contact with pets and other animals.    Don t share food or personal items with people in your household. This includes items like eating and drinking utensils, towels, and bedding.    If you need to cough or sneeze, do it into a tissue. Then throw the tissue into the trash. If you don't have tissues, cough or sneeze into the bend of your elbow.    Wash your hands often.    Self-care at home   At this time, there is no medicine approved to prevent or treat COVID-19. The FDA has approved an antiviral medicine (called remdesivir) for people being treated in the hospital. This is for people 12 years and older who weigh more than about 88 pounds (40 kgs). In certain cases, it may also be used for people younger than 12 years or who weigh less than about 88 pounds (40 kgs)..  Currently, treatment is mostly aimed at helping your body while it fights the virus.    Getting extra rest.    Drink extra fluids 6 to 8 glasses of liquids each day), unless a doctor has told you not to. Ask your care team which fluids are best for you. Avoid fluids that contain caffeine or alcohol.    Taking over-the-counter (OTC) medicine to reduce pain and fever. Your care team will tell you which medicine to use.  If you ve been in the hospital  for COVID-19, follow your care team s instructions. They will tell you when to stop self-isolation. They may also have you change positions to help your breathing (such as lying on your belly).  If a test showed that you have COVID-19, you may be asked to donate plasma after you ve recovered. (This is called COVID-19 convalescent plasma donation.) The plasma may contain antibodies to help fight the virus in others. Experts don't know if the plasma will work well as a treatment. Research continues, and the FDA has approved it for emergency use in certain people with serious or life-threatening COVID-19. For more information, talk to your care team.  Caring for a sick person     Follow all instructions from the care team.    Wash your hands often.    Wear protective clothing as advised.    Make sure the sick person wears a mask. If they can't wear a mask, don t stay in the same room with them. If you must be in the same room, wear a face mask. Make sure the mask covers both the nose and mouth.    Keep track of the sick person s symptoms.    Clean surfaces often with disinfectant. This includes phones, kitchen counters, fridge door handles, bathroom surfaces, and others.    Don t let anyone share household items with the sick person. This includes eating and drinking tools, towels, sheets, or blankets.    Clean fabrics and laundry well.    Keep other people and pets away from the sick person.    When you can stop self-isolation  When you are sick with COVID-19, you should stay away from other people. This is called self-isolation. The rules for ending self-isolation depend on your health in general.  If you are normally healthy:  You can stop self-isolation when all 3 of these are true:    You ve had no fever--and no medicine that reduces fever--for at least 24 hours. And     Your symptoms are better, such as cough or trouble breathing. And     At least 10 days have passed since your symptoms first started.  Talk with  your care team before you leave home. They may tell you it s okay to leave, or they may give you different advice. You do not need to be re-tested.  If you have a weak immune system, or you ve had severe COVID-19:  Follow your care team s instructions. You may be asked to self-isolate for 10 days to 20 days after your symptoms first started. Your care team may want to re-test you for COVID-19.  Note: If you re being treated for cancer, have an immune disorder (such as HIV), or have had a transplant (organ or bone marrow), you may have a weak immune system.  If you've already had COVID-19 once:  If you had the virus over 3 months ago, and you ve been exposed again, treat it like you've never had COVID-19. Stay home, limit your contact with others, call your care team, and watch for symptoms.  If it s been less than 3 months since you had the virus, you re symptom-free, and you ve been exposed again: You don t need to self-isolate or be re-tested. But if you develop new symptoms that can t be linked to another illness, please self-isolate and contact your care team.  When you return to public settings  When you are well enough to go outside your home, follow the CDC s guidance on cloth face masks.    Anyone over age 2 should wear a face mask in public, especially when it's hard to socially distance. This includes public transit, public protests and marches, and crowded stores, bars, and restaurants.    Face masks are most likely to reduce the spread of COVID-19 when they are widely used by people who are out in the public.  Certain people should not wear a face covering. These include:    Children under 2 years old    Anyone with a health, developmental, or mental health condition that can be made worse by wearing a mask    Anyone who is unconscious or unable to remove the face covering without help. See the CDC's guidance on who should not wear a face mask.  When to call your care team  Call your care team right away  if a sick person has any of these:    Trouble breathing    Pain or pressure in chest  If a sick person has any of these, call 911:    Trouble breathing that gets worse    Pain or pressure in chest that gets worse    Blue tint to lips or face    Fast or irregular heartbeat    Confusion or trouble waking    Fainting or loss of consciousness    Coughing up blood  Going home from the hospital   If you have COVID-19 and were recently in the hospital:    Follow the instructions above for self-care and isolation.    Follow the hospital care team s instructions.    Ask questions if anything is unclear to you. Write down answers so you remember them.  Date last modified: 11/23/2020  [employer] last reviewed this educational content on 4/1/2020  This information has been modified by your health care provider with permission from the publisher.    2835-3111 [employer]. [address] [place] [address], [place] [address]. All rights reserved. This information is not intended as a substitute for professional medical care. Always follow your healthcare professional's instructions.             1.  Plenty of fluids, rest, warm compresses on face  2.  Mucinex twice daily for at least 4 days  3.  Rosa Pot 1x in the morning 1x at night (SALINE MIST SPRAY IS AN ACCEPTABLE, THOUGH NOT AS EFFECTIVE REPLACEMENT)  4.  Benadryl (diphenhydramine) at bedtime   5.  Either Claritin (Loratadine), Allegra (Fexofenadine), or Zyrtec (Cetirizine) in the day  6.  Flonase (Fluticasone) 2x each nostril twice a day for two weeks, then once each nostril once a day  7. Antibiotic twice daily for 7 Days  8. Probiotic (ie Culturelle) 1-2 hours after each antibiotic dose     Please let us know if symptoms persist, or worsen.      Patient Education     Self-Care for Sinusitis     Drinking plenty of water can help sinuses drain.   Sinusitis can often be managed with self-care. Self-care can keep sinuses moist and make you feel more comfortable. Remember to  follow your doctor's instructions closely. This can make a big difference in getting your sinus problem under control.  Drink fluids  Drinking extra fluids helps thin your mucus. This lets it drain from your sinuses more easily. Have a glass of water every hour or two. A humidifier helps in much the same way. Fluids can also offset the drying effects of certain medicines. If you use a humidifier, follow the product maker's instructions on how to use it. Clean it on a regular schedule.  Use saltwater rinses  Rinses help keep your sinuses and nose moist. Mix a teaspoon of salt in 8 ounces of fresh, warm water. Use a bulb syringe to gently squirt the water into your nose a few times a day. You can also buy ready-made saline nasal sprays.  Apply hot or cold packs  Applying heat to the area surrounding your sinuses may make you feel more comfortable. Use a hot water bottle or a hand towel dipped in hot water. Some people also find ice packs effective for relieving pain.  Medicines  Your doctor may prescribe medications to help treat your sinusitis. If you have an infection, antibiotics can help clear it up. If you are prescribed antibiotics, take all pills on schedule until they are gone, even if you feel better. Decongestants help relieve swelling. Use decongestant sprays for short periods only under the direction of your doctor. If you have allergies, your doctor may prescribe medications to help relieve them.   Date Last Reviewed: 10/1/2016    8328-3463 [employer]. [address] [place] [address], [place] [address]. All rights reserved. This information is not intended as a substitute for professional medical care. Always follow your healthcare professional's instructions.

## 2021-11-23 NOTE — PATIENT INSTRUCTIONS
Covid-19    Follow up immediately with severe headache, chest pain, or shortness of breath    Rest, isolate for 10 days, hydrate, follow up if worsening or new symptoms  Household members to isolate until test results, if positive isolate for 2 weeks and follow up for testing if symptoms occur         Patient Education     Coronavirus Disease 2019 (COVID-19): Caring for Yourself or Others   If you or a household member have symptoms of COVID-19, follow the guidelines below. This will help you manage symptoms and keep the virus from spreading.  If you have symptoms of COVID-19    Stay home and contact your care team. They will tell you what to do.    Don t panic. Keep in mind that other illnesses can cause similar symptoms.    Stay away from work, school, and public places.    Limit physical contact with others in your home. Limit visitors. No kissing.  Clean surfaces you touch with disinfectant.  If you need to cough or sneeze, do it into a tissue. Then throw the tissue into the trash. If you don't have tissues, cough or sneeze into the bend of your elbow.  Don t share food or personal items with people in your household. This includes items like eating and drinking utensils, towels, and bedding.  Wear a cloth face mask around other people. During a public health emergency, medical face masks may be reserved for healthcare workers. You may need to make a cloth face mask of your own. You can do this using a bandana, T-shirt, or other cloth. The CDC has instructions on how to make a face mask. Wear the mask so that it covers both your nose and mouth.  If you need to go to a hospital or clinic, call ahead to let them know. Expect the care team to wear masks, gowns, gloves, and eye protection. You may need to come to a different entrance or wait in a separate room.  Follow all instructions from your care team.    If you ve been diagnosed with COVID-19    Stay home and away from others, including other people in your  home. (This is called self-isolation.) Don t leave home unless you need to get medical care. Don t go to work, school, or public places. Don t use buses, taxis, or other public transportation.    Follow all instructions from your care team.    If you need to go to a hospital or clinic, call ahead to let them know. Expect the care team to wear masks, gowns, gloves, and eye protection. You may need to come to a different entrance or wait in a separate room.    Wear a face mask over your nose and mouth. This is to protect others from your germs. If you can t wear a mask, your caregivers should wear one. You may need to make your own mask using a bandana, T-shirt, or other cloth. See the CDC s instructions on how to make a face mask.    Have no contact with pets and other animals.    Don t share food or personal items with people in your household. This includes items like eating and drinking utensils, towels, and bedding.    If you need to cough or sneeze, do it into a tissue. Then throw the tissue into the trash. If you don't have tissues, cough or sneeze into the bend of your elbow.    Wash your hands often.    Self-care at home   At this time, there is no medicine approved to prevent or treat COVID-19. The FDA has approved an antiviral medicine (called remdesivir) for people being treated in the hospital. This is for people 12 years and older who weigh more than about 88 pounds (40 kgs). In certain cases, it may also be used for people younger than 12 years or who weigh less than about 88 pounds (40 kgs)..  Currently, treatment is mostly aimed at helping your body while it fights the virus.    Getting extra rest.    Drink extra fluids 6 to 8 glasses of liquids each day), unless a doctor has told you not to. Ask your care team which fluids are best for you. Avoid fluids that contain caffeine or alcohol.    Taking over-the-counter (OTC) medicine to reduce pain and fever. Your care team will tell you which medicine to  use.  If you ve been in the hospital for COVID-19, follow your care team s instructions. They will tell you when to stop self-isolation. They may also have you change positions to help your breathing (such as lying on your belly).  If a test showed that you have COVID-19, you may be asked to donate plasma after you ve recovered. (This is called COVID-19 convalescent plasma donation.) The plasma may contain antibodies to help fight the virus in others. Experts don't know if the plasma will work well as a treatment. Research continues, and the FDA has approved it for emergency use in certain people with serious or life-threatening COVID-19. For more information, talk to your care team.  Caring for a sick person     Follow all instructions from the care team.    Wash your hands often.    Wear protective clothing as advised.    Make sure the sick person wears a mask. If they can't wear a mask, don t stay in the same room with them. If you must be in the same room, wear a face mask. Make sure the mask covers both the nose and mouth.    Keep track of the sick person s symptoms.    Clean surfaces often with disinfectant. This includes phones, kitchen counters, fridge door handles, bathroom surfaces, and others.    Don t let anyone share household items with the sick person. This includes eating and drinking tools, towels, sheets, or blankets.    Clean fabrics and laundry well.    Keep other people and pets away from the sick person.    When you can stop self-isolation  When you are sick with COVID-19, you should stay away from other people. This is called self-isolation. The rules for ending self-isolation depend on your health in general.  If you are normally healthy:  You can stop self-isolation when all 3 of these are true:    You ve had no fever--and no medicine that reduces fever--for at least 24 hours. And     Your symptoms are better, such as cough or trouble breathing. And     At least 10 days have passed since  your symptoms first started.  Talk with your care team before you leave home. They may tell you it s okay to leave, or they may give you different advice. You do not need to be re-tested.  If you have a weak immune system, or you ve had severe COVID-19:  Follow your care team s instructions. You may be asked to self-isolate for 10 days to 20 days after your symptoms first started. Your care team may want to re-test you for COVID-19.  Note: If you re being treated for cancer, have an immune disorder (such as HIV), or have had a transplant (organ or bone marrow), you may have a weak immune system.  If you've already had COVID-19 once:  If you had the virus over 3 months ago, and you ve been exposed again, treat it like you've never had COVID-19. Stay home, limit your contact with others, call your care team, and watch for symptoms.  If it s been less than 3 months since you had the virus, you re symptom-free, and you ve been exposed again: You don t need to self-isolate or be re-tested. But if you develop new symptoms that can t be linked to another illness, please self-isolate and contact your care team.  When you return to public settings  When you are well enough to go outside your home, follow the CDC s guidance on cloth face masks.    Anyone over age 2 should wear a face mask in public, especially when it's hard to socially distance. This includes public transit, public protests and marches, and crowded stores, bars, and restaurants.    Face masks are most likely to reduce the spread of COVID-19 when they are widely used by people who are out in the public.  Certain people should not wear a face covering. These include:    Children under 2 years old    Anyone with a health, developmental, or mental health condition that can be made worse by wearing a mask    Anyone who is unconscious or unable to remove the face covering without help. See the CDC's guidance on who should not wear a face mask.  When to call your  care team  Call your care team right away if a sick person has any of these:    Trouble breathing    Pain or pressure in chest  If a sick person has any of these, call 911:    Trouble breathing that gets worse    Pain or pressure in chest that gets worse    Blue tint to lips or face    Fast or irregular heartbeat    Confusion or trouble waking    Fainting or loss of consciousness    Coughing up blood  Going home from the hospital   If you have COVID-19 and were recently in the hospital:    Follow the instructions above for self-care and isolation.    Follow the hospital care team s instructions.    Ask questions if anything is unclear to you. Write down answers so you remember them.  Date last modified: 11/23/2020  [employer] last reviewed this educational content on 4/1/2020  This information has been modified by your health care provider with permission from the publisher.    4538-5468 [employer]. [address] [place] [id number]. All rights reserved. This information is not intended as a substitute for professional medical care. Always follow your healthcare professional's instructions.             1.  Plenty of fluids, rest, warm compresses on face  2.  Mucinex twice daily for at least 4 days  3.  Rosa Pot 1x in the morning 1x at night (SALINE MIST SPRAY IS AN ACCEPTABLE, THOUGH NOT AS EFFECTIVE REPLACEMENT)  4.  Benadryl (diphenhydramine) at bedtime   5.  Either Claritin (Loratadine), Allegra (Fexofenadine), or Zyrtec (Cetirizine) in the day  6.  Flonase (Fluticasone) 2x each nostril twice a day for two weeks, then once each nostril once a day  7. Antibiotic twice daily for 7 Days  8. Probiotic (ie Culturelle) 1-2 hours after each antibiotic dose     Please let us know if symptoms persist, or worsen.      Patient Education     Self-Care for Sinusitis     Drinking plenty of water can help sinuses drain.   Sinusitis can often be managed with self-care. Self-care can keep sinuses moist and  make you feel more comfortable. Remember to follow your doctor's instructions closely. This can make a big difference in getting your sinus problem under control.  Drink fluids  Drinking extra fluids helps thin your mucus. This lets it drain from your sinuses more easily. Have a glass of water every hour or two. A humidifier helps in much the same way. Fluids can also offset the drying effects of certain medicines. If you use a humidifier, follow the product maker's instructions on how to use it. Clean it on a regular schedule.  Use saltwater rinses  Rinses help keep your sinuses and nose moist. Mix a teaspoon of salt in 8 ounces of fresh, warm water. Use a bulb syringe to gently squirt the water into your nose a few times a day. You can also buy ready-made saline nasal sprays.  Apply hot or cold packs  Applying heat to the area surrounding your sinuses may make you feel more comfortable. Use a hot water bottle or a hand towel dipped in hot water. Some people also find ice packs effective for relieving pain.  Medicines  Your doctor may prescribe medications to help treat your sinusitis. If you have an infection, antibiotics can help clear it up. If you are prescribed antibiotics, take all pills on schedule until they are gone, even if you feel better. Decongestants help relieve swelling. Use decongestant sprays for short periods only under the direction of your doctor. If you have allergies, your doctor may prescribe medications to help relieve them.   Date Last Reviewed: 10/1/2016    [id number] [employer]. [address] [place] [address], [place] [id number]. All rights reserved. This information is not intended as a substitute for professional medical care. Always follow your healthcare professional's instructions.

## 2021-11-24 LAB — SARS-COV-2 RNA RESP QL NAA+PROBE: NEGATIVE

## 2022-01-04 DIAGNOSIS — F33.1 MAJOR DEPRESSIVE DISORDER, RECURRENT EPISODE, MODERATE (H): ICD-10-CM

## 2022-01-05 ENCOUNTER — MYC MEDICAL ADVICE (OUTPATIENT)
Dept: PEDIATRICS | Facility: CLINIC | Age: 37
End: 2022-01-05
Payer: COMMERCIAL

## 2022-01-06 RX ORDER — BUPROPION HYDROCHLORIDE 200 MG/1
TABLET, EXTENDED RELEASE ORAL
Qty: 60 TABLET | Refills: 0 | Status: SHIPPED | OUTPATIENT
Start: 2022-01-06 | End: 2022-01-10

## 2022-01-06 NOTE — TELEPHONE ENCOUNTER
Prescription approved per [facility] Refill Protocol.  BRITANY refill. Further refills at upcoming appointment.  Giselle Angeles RN

## 2022-01-10 ENCOUNTER — VIRTUAL VISIT (OUTPATIENT)
Dept: PEDIATRICS | Facility: CLINIC | Age: 37
End: 2022-01-10
Payer: COMMERCIAL

## 2022-01-10 DIAGNOSIS — E28.2 PCOS (POLYCYSTIC OVARIAN SYNDROME): ICD-10-CM

## 2022-01-10 DIAGNOSIS — F33.1 MAJOR DEPRESSIVE DISORDER, RECURRENT EPISODE, MODERATE (H): ICD-10-CM

## 2022-01-10 DIAGNOSIS — J01.90 ACUTE SINUSITIS WITH SYMPTOMS > 10 DAYS: ICD-10-CM

## 2022-01-10 DIAGNOSIS — E03.9 HYPOTHYROIDISM, UNSPECIFIED TYPE: ICD-10-CM

## 2022-01-10 DIAGNOSIS — F41.9 ANXIETY: ICD-10-CM

## 2022-01-10 PROCEDURE — 99214 OFFICE O/P EST MOD 30 MIN: CPT | Mod: 95 | Performed by: PEDIATRICS

## 2022-01-10 RX ORDER — BUPROPION HYDROCHLORIDE 200 MG/1
200 TABLET, EXTENDED RELEASE ORAL 2 TIMES DAILY
Qty: 60 TABLET | Refills: 5 | Status: SHIPPED | OUTPATIENT
Start: 2022-01-10 | End: 2022-07-15

## 2022-01-10 RX ORDER — ESCITALOPRAM OXALATE 20 MG/1
TABLET ORAL
Qty: 45 TABLET | Refills: 6 | Status: SHIPPED | OUTPATIENT
Start: 2022-01-10 | End: 2022-07-15

## 2022-01-10 RX ORDER — LEVOTHYROXINE SODIUM 75 UG/1
75 TABLET ORAL DAILY
Qty: 30 TABLET | Refills: 11 | Status: SHIPPED | OUTPATIENT
Start: 2022-01-10 | End: 2022-07-15

## 2022-01-10 NOTE — PROGRESS NOTES
"Eloina is a 36 year old who is being evaluated via a billable video visit.      How would you like to obtain your AVS? MyChart  If the video visit is dropped, the invitation should be resent by: EPIC  Will anyone else be joining your video visit? No    Video Start Time: 8:29am    Assessment & Plan     Acute sinusitis with symptoms > 10 days  3 weeks for right sided pressure, COVID test neg x 2  - amoxicillin-clavulanate (AUGMENTIN) 875-125 MG tablet; Take 1 tablet by mouth 2 times daily    Anxiety  Controlled, refill and follow up in 6m  - escitalopram (LEXAPRO) 20 MG tablet; TAKE 1 AND 1/2 TABLETS BY MOUTH DAILY    Major depressive disorder, recurrent episode, moderate (H)  Stable, refill and follow up in 6m  - escitalopram (LEXAPRO) 20 MG tablet; TAKE 1 AND 1/2 TABLETS BY MOUTH DAILY  - buPROPion (WELLBUTRIN SR) 200 MG 12 hr tablet; Take 1 tablet (200 mg) by mouth 2 times daily    PCOS (polycystic ovarian syndrome)  Stable - will be due for A1C at physical  - metFORMIN (GLUCOPHAGE) 500 MG tablet; Take 1 tablet (500 mg) by mouth 2 times daily (with meals) Office visit needed prior to additional refills.    Hypothyroidism, unspecified type  Stable, refill, not due for labs yet  - levothyroxine (SYNTHROID/LEVOTHROID) 75 MCG tablet; Take 1 tablet (75 mcg) by mouth daily    Body mass index (BMI) of 40.0-44.9 in adult (H)  Will address at physical     BMI:   Estimated body mass index is 44.93 kg/m  as calculated from the following:    Height as of 6/3/21: 1.651 m (5' 5\").    Weight as of 7/22/21: 122.5 kg (270 lb).       Patient Instructions   Dear Eloina,    It was nice to talk with you today.     We will keep the lexapro and wellbutrin the same.  I'm glad things have improved.     I have sent Augmentin to the pharmacy for your sinus infection.    We will see you in 6 months for an in person physical/med check.    Cortney Silveira MD  Internal Medicine/Pediatrics  [facility]          Return in about 6 months " (around 7/10/2022) for Routine preventive, with me, in person.    Cortney Silveira MD  [facility]    Phyllis Duvall is a 36 year old who presents for the following health issues     HPI     Last visit 10/11/21: had kept things the same with lexpapro and wellbutrin.  Has discussed possibly adding buspar in the future.     She does feel that things are better - she thinks the sports slowing mindi is better.  She feels that she is trying to calm.    Daughter with hockey and basketball.     Right maxillary sinus pressure x 3 weeks, covid test neg x 2.  Trying flonase, decongestants    Review of Systems         Objective           Vitals:  No vitals were obtained today due to virtual visit.    Physical Exam   GENERAL: Healthy, alert and no distress  EYES: Eyes grossly normal to inspection.  No discharge or erythema, or obvious scleral/conjunctival abnormalities.  RESP: No audible wheeze, cough, or visible cyanosis.  No visible retractions or increased work of breathing.    SKIN: Visible skin clear. No significant rash, abnormal pigmentation or lesions.  NEURO: Cranial nerves grossly intact.  Mentation and speech appropriate for age.  PSYCH: Mentation appears normal, affect normal/bright, judgement and insight intact, normal speech and appearance well-groomed.                Video-Visit Details    Type of service:  Video Visit    Video End Time:8:36am    Originating Location (pt. Location): Home    Distant Location (provider location):  [facility]     Platform used for Video Visit: [employer]ue[employer]

## 2022-01-10 NOTE — PATIENT INSTRUCTIONS
Dear Eloina,    It was nice to talk with you today.     We will keep the lexapro and wellbutrin the same.  I'm glad things have improved.     I have sent Augmentin to the pharmacy for your sinus infection.    We will see you in 6 months for an in person physical/med check.    Cortney Silveira MD  Internal Medicine/Pediatrics  [facility]

## 2022-02-04 ENCOUNTER — TRANSFERRED RECORDS (OUTPATIENT)
Dept: HEALTH INFORMATION MANAGEMENT | Facility: CLINIC | Age: 37
End: 2022-02-04

## 2022-02-04 LAB — PAP SMEAR - HIM PATIENT REPORTED: NEGATIVE

## 2022-02-12 ENCOUNTER — HEALTH MAINTENANCE LETTER (OUTPATIENT)
Age: 37
End: 2022-02-12

## 2022-02-13 ENCOUNTER — MYC MEDICAL ADVICE (OUTPATIENT)
Dept: PEDIATRICS | Facility: CLINIC | Age: 37
End: 2022-02-13
Payer: COMMERCIAL

## 2022-02-14 ENCOUNTER — OFFICE VISIT (OUTPATIENT)
Dept: PEDIATRICS | Facility: CLINIC | Age: 37
End: 2022-02-14
Payer: COMMERCIAL

## 2022-02-14 ENCOUNTER — NURSE TRIAGE (OUTPATIENT)
Dept: PEDIATRICS | Facility: CLINIC | Age: 37
End: 2022-02-14
Payer: COMMERCIAL

## 2022-02-14 VITALS
TEMPERATURE: 97.4 F | SYSTOLIC BLOOD PRESSURE: 122 MMHG | RESPIRATION RATE: 14 BRPM | HEART RATE: 77 BPM | OXYGEN SATURATION: 100 % | DIASTOLIC BLOOD PRESSURE: 78 MMHG | WEIGHT: 288.8 LBS | BODY MASS INDEX: 48.06 KG/M2

## 2022-02-14 DIAGNOSIS — I89.0 LYMPHEDEMA: Primary | ICD-10-CM

## 2022-02-14 PROBLEM — J45.901 ASTHMA WITH EXACERBATION: Status: ACTIVE | Noted: 2022-02-14

## 2022-02-14 LAB — D DIMER PPP FEU-MCNC: 0.31 UG/ML FEU (ref 0–0.5)

## 2022-02-14 PROCEDURE — 80053 COMPREHEN METABOLIC PANEL: CPT | Performed by: INTERNAL MEDICINE

## 2022-02-14 PROCEDURE — 36415 COLL VENOUS BLD VENIPUNCTURE: CPT | Performed by: INTERNAL MEDICINE

## 2022-02-14 PROCEDURE — 99213 OFFICE O/P EST LOW 20 MIN: CPT | Performed by: INTERNAL MEDICINE

## 2022-02-14 PROCEDURE — 85379 FIBRIN DEGRADATION QUANT: CPT | Performed by: INTERNAL MEDICINE

## 2022-02-14 NOTE — PROGRESS NOTES
Assessment & Plan     (I89.0) Lymphedema  (primary encounter diagnosis)  Comment:   Edema ddx: chf, renal insuff, chronic liver disease, hypoalbuminemia, dvt, venous stasis  Plan: Comprehensive metabolic panel (BMP + Alb, Alk         Phos, ALT, AST, Total. Bili, TP), D dimer,         quantitative, Lymphedema Therapy Referral            Labwork as noted.  Suspect lymphedema associated with obesity.  A handout with pertinent patient health education information is given. Recommended support stockings--pt states she tried this but they were uncomfortable.  Referral to lymphedema clinic regarding other management options.  Follow-up 4 weeks if sx persist      Return in about 4 weeks (around 3/14/2022).    Erwin Caba MD  [facility] Duvall is a 36 year old who presents for the following health issues:     HPI     Bilateral leg swelling   Noticed: 2-3 months  Swelling worse when standing throughout the day or sitting with her children on the floor.  No family hx of blood clots, no known renal or liver disease  No cardiac hx  Swelling limited to both legs below the knees   Swelling resolves after elevating legs at night  No redness warmth fevers, cough, CP or resp symptoms  Body mass index is 48.06 kg/m .      Patient Active Problem List   Diagnosis     Major depressive disorder, recurrent episode, moderate (H)     Anxiety     Hypothyroidism, unspecified type     Obesity (H)     PCOS (polycystic ovarian syndrome)     Fibromyalgia     History of gestational diabetes     Altered bowel function     Splenomegaly     Cyst of spleen     Asthma with exacerbation     Current Outpatient Medications   Medication Sig Dispense Refill     albuterol (PROAIR HFA/PROVENTIL HFA/VENTOLIN HFA) 108 (90 Base) MCG/ACT inhaler Inhale 2 puffs into the lungs every 4 hours (while awake) 8.5 g 0     amoxicillin-clavulanate (AUGMENTIN) 875-125 MG tablet Take 1 tablet by mouth 2 times daily 14 tablet 0      buPROPion (WELLBUTRIN SR) 200 MG 12 hr tablet Take 1 tablet (200 mg) by mouth 2 times daily 60 tablet 5     escitalopram (LEXAPRO) 20 MG tablet TAKE 1 AND 1/2 TABLETS BY MOUTH DAILY 45 tablet 6     JUNEL FE 1/20 1-20 MG-MCG per tablet Take 1 tablet by mouth daily (Patient not taking: Reported on 7/22/2021)  3     levothyroxine (SYNTHROID/LEVOTHROID) 75 MCG tablet Take 1 tablet (75 mcg) by mouth daily 30 tablet 11     metFORMIN (GLUCOPHAGE) 500 MG tablet Take 1 tablet (500 mg) by mouth 2 times daily (with meals) Office visit needed prior to additional refills. 60 tablet 11     omeprazole (PRILOSEC) 40 MG DR capsule Take 1 capsule (40 mg) by mouth daily 90 capsule 0     triamcinolone (KENALOG) 0.1 % external cream Apply topically 3 times daily as needed (psoriasis) 30 g 1     Vitamin D, Cholecalciferol, 25 MCG (1000 UT) TABS Take 1 tablet by mouth daily            Objective    /78 (BP Location: Right arm, Patient Position: Standing, Cuff Size: Adult Large)   Pulse 77   Temp 97.4  F (36.3  C) (Tympanic)   Resp 14   Wt 131 kg (288 lb 12.8 oz)   SpO2 100%   BMI 48.06 kg/m    Body mass index is 48.06 kg/m .  Physical Exam   GENERAL: healthy, alert and no distress  MS: no gross musculoskeletal defects noted      Trace nonpitting edema LE b/l without erythema or warmth  SKIN: no suspicious lesions or rashes  PSYCH: mentation appears normal, affect normal/bright

## 2022-02-14 NOTE — TELEPHONE ENCOUNTER
Called the Pt to discuss below. No answer, LM.     Fern Post, RN   [facility]  -- Triage Nurse

## 2022-02-14 NOTE — PATIENT INSTRUCTIONS
Patient Education     Understanding Lymphedema  Lymphedema is a build-up of lymph fluid that causes swelling. It can happen if lymph nodes or lymph vessels are removed or damaged. Surgery and radiation to treat cancer can cause this damage. Then lymph fluid may collect and cause swelling in the treated parts of your body. This can happen any time, even many years after treatment.   After cancer treatment that removes or damages lymph nodes, you are at risk for lymphedema for the rest of your life. But you can do things to help reduce your risk. And there are ways to reduce or relieve swelling if it happens. If left untreated, lymphedema can get worse. It may not go away. It can also lead to other problems such as pain and infections.   What is the lymph system?  The lymphatic system is part of your immune system. It s a network of tiny vessels and small organs called lymph nodes. These nodes are found along the vessels. This system carries lymph throughout the body. Lymph is a clear fluid that contains a few white blood cells.   The lymphatic system helps protect your body and keep it healthy. It helps keep your fluids at the correct level. It also and filters lymph to help fight infection.   How lymphedema happens  Lymph nodes and vessels are removed during surgery to treat many kinds of cancer. Or they may be treated with radiation. This scars and damages them. It disrupts the normal flow of lymph fluid, which can lead to swelling. So instead of lymph draining into your body as it should, the fluid collects in the fatty tissues under your skin. This causes swelling. The changes in the flow of lymph also keep the lymph from being filtered the way it should. This can increase the risk for infection. It can interfere with wound healing in the affected areas.   Lymphedema can affect one or both arms or legs, the groin, the face, the head and neck, or the belly (abdomen). It depends on which part of your body  was treated. The swelling can get worse over time and cause problems. It can lead to skin sores or other skin changes. These areas are also more likely to become infected.   Symptoms of lymphedema  Lymphedema can occur in an arm, leg, groin, chest, head, neck, or armpit. Symptoms can include:     Swelling    A feeling of fullness or heaviness    Your skin feels stiff or tight    Weakness    Aching or pain    Skin that looks red    Trouble bending or moving a joint in your fingers, wrist, elbow, shoulder, or ankle    Shoes, clothing, bra, or jewelry feels tight  Can lymphedema be prevented?  Not all experts agree on what might help reduce risk. But one of the most important things you can do is watch for signs of lymphedema. As you heal, be aware of how your hand, arm, and chest normally feel and look. Compare the sides of your body. Watch for changes. If you notice any changes, let your provider know right away. The sooner any swelling is treated, the better the chances of reducing it and keeping it from getting worse.   Here are some other thing to do:    Get follow-up care after cancer treatment. See your healthcare provider on a regular basis for checkups. Ask about your risk for lymphedema. You also may want to ask about seeing a lymphedema specialist to learn more about what you can do to try to prevent lymphedema.    Prevent infection and inflammation. Wash, treat, and cover any skin wounds, even a small cut, scratch, or burn. Keep your skin clean, and use lotion to keep it moist. Check your skin often. Get treatment right away at the first sign of infection.    Keep active. Ask your healthcare team about the type of exercise that s best for you. A lymphedema specialist can help you learn safe exercises.    Manage your weight. Talk with your provider about what s a healthy weight for you. Ask them for help with how to get to or stay at that weight.  When to call your healthcare provider  Lymphedema needs to be  treated right away. Call your healthcare provider if you have:     Any of the symptoms of lymphedema listed above    Signs of infection such as red blotches, warmth, or pain  [employer] last reviewed this educational content on 9/1/2018 2000-2021 [employer]. All rights reserved. This information is not intended as a substitute for professional medical care. Always follow your healthcare professional's instructions.             Lymphedema treatment  There are no medicines currently used to treat lymphedema. Instead, the most common treatment for lymphedema is complete decongestive therapy (CDT). This is a set of methods used together to reduce your symptoms. CDT is done by trained therapists. To help show how well the treatment is working, your arms or legs may be measured before and after CDT. The treatment most often involves 1 or more of the following:    Manual lymphatic drainage. This is a kind of massage that uses gentle pressure to help move lymph out of areas where it is collecting. It is done by a therapist or nurse with special training. It can also be learned and done at home.    Intermittent pneumatic compression. This uses a device to apply and relieve pressure to the arms or legs. Sleeves are put over the arms or legs. A pump fills the sleeves with air. Then the air is let out. This happens many times in a row.    Compression bandages. This means wearing stretchy or padded fabric on the parts of the body with lymphedema. This may include bandages, tape, or other types of compression wraps. They help support your tissues so lymph can flow more freely. And they help prevent lymph fluid from building up.    Therapeutic exercises. Some kinds of exercise may help your symptoms. These may include aerobic exercise, such as brisk walking. And they may also include gradual weight-lifting exercises that build muscle.    Skin and nail care. Proper care of your skin and nails will help prevent  infection. See the  Preventing Infection for Life  section for more information.    Compression garments. These are worn as often as needed, for life. These include sleeves, gloves, stockings, undershirt, or other types of special clothes. They squeeze or compress parts of the body to help prevent lymph buildup. You may wear these during the day, or at night when you re asleep.  Tips for living with lymphedema When to call your healthcare provider    Don't get too cold or too hot. This can cause the skin to swell and dry out. It can also cause more fluid to build up. Be careful around hot objects to avoid burns. Don t use hot tubs, saunas, ice packs, or a heating pad.    Don't wear anything that squeezes the affected area. This may cause more swelling. Wear loose clothing and jewelry. If your legs are affected, don t wear tight socks or undergarments, and don't  cross your legs when you sit. This can block lymph drainage.    Don't gain weight. This can make your symptoms worse.    Tell your healthcare providers. If your arms are affected, tell your healthcare providers about your lymphedema before getting shots, an IV, or having your blood pressure taken.  Call your healthcare provider right away if you have:    Fever of 100.4 F (38 C) or higher, or as directed by your healthcare provider    Signs of infection such as red blotches, warmth, or pain    Sudden increase in swelling    New pain in the affected area  Preventing infection for life  An important part of staying healthy with lymphedema is preventing infections in the swollen areas. Lymphedema makes it easier for germs (bacteria) to grow in those areas. To help prevent infection:    Keep your skin clean, and moisturize it with lotion.    Be extra careful when shaving, and use a clean razor on clean skin.    Check your skin regularly for cuts, sores, bug bites, or other problems.    Use an antibacterial ointment if you have a cut or sore.    Don't pick at,  bite, or cut the skin around your fingernails. Use a cuticle stick to push your cuticles back.    Trim your fingernails and toenails straight across to prevent ingrown nails.    If at all possible, don't allow blood to be taken or shots given in any affected limb.    Prevent skin burns by wearing sunscreen and using gloves when cooking or doing household work.    Wear shoes that fit well and don't cause blisters.    Use an insect repellent so you don't get bug bites when outdoors.   Working with your healthcare team  Get regular checkups and report any changes right away. See a specially trained lymphedema therapist to learn more about lymphedema and to get help managing it.  [employer] last reviewed this educational content on 2/1/2018 2000-2021 [employer]. All rights reserved. This information is not intended as a substitute for professional medical care. Always follow your healthcare professional's instructions.

## 2022-02-14 NOTE — TELEPHONE ENCOUNTER
"Has had some leg swelling for the past couple months. Seems worse today. Both legs from the knee down. Per protocol needs eval today. Anusha Gallo RN on 2/14/2022 at 10:55 AM      Reason for Disposition    MODERATE swelling of both ankles (e.g., swelling extends up to the knees) AND new onset or worsening    Additional Information    Negative: Sounds like a life-threatening emergency to the triager    Negative: Chest pain    Negative: Small area of swelling and followed an insect bite to the area    Negative: Followed a knee injury    Negative: Ankle or foot injury    Negative: Pregnant with leg swelling or edema    Negative: Difficulty breathing at rest    Negative: Entire foot is cool or blue in comparison to other side    Negative: SEVERE swelling (e.g., swelling extends above knee, entire leg is swollen, weeping fluid)    Negative: Thigh or calf pain and only 1 side and present > 1 hour    Negative: Thigh, calf, or ankle swelling in only one leg    Negative: Thigh, calf, or ankle swelling in both legs, but one side is definitely more swollen (Exception: longstanding difference between legs)    Negative: Cast on leg or ankle and has increasing pain    Negative: Can't walk or can barely stand (new onset)    Negative: Fever and red area (or area very tender to touch)    Negative: Patient sounds very sick or weak to the triager    Negative: Swelling of face, arm or hands (Exception: slight puffiness of fingers during hot weather)    Negative: Pregnant > 20 weeks and sudden weight gain (i.e., > 2 lbs, 1 kg in one week)    Answer Assessment - Initial Assessment Questions  1. ONSET: \"When did the swelling start?\" (e.g., minutes, hours, days)      Couple months ago  2. LOCATION: \"What part of the leg is swollen?\"  \"Are both legs swollen or just one leg?\"      Both legs from the knee down  3. SEVERITY: \"How bad is the swelling?\" (e.g., localized; mild, moderate, severe)   - Localized - small area of swelling localized " "to one leg   - MILD pedal edema - swelling limited to foot and ankle, pitting edema < 1/4 inch (6 mm) deep, rest and elevation eliminate most or all swelling   - MODERATE edema - swelling of lower leg to knee, pitting edema > 1/4 inch (6 mm) deep, rest and elevation only partially reduce swelling   - SEVERE edema - swelling extends above knee, facial or hand swelling present       moderate  4. REDNESS: \"Does the swelling look red or infected?\"      no  5. PAIN: \"Is the swelling painful to touch?\" If so, ask: \"How painful is it?\"   (Scale 1-10; mild, moderate or severe)      none  6. FEVER: \"Do you have a fever?\" If so, ask: \"What is it, how was it measured, and when did it start?\"       none  7. CAUSE: \"What do you think is causing the leg swelling?\"      unsure  8. MEDICAL HISTORY: \"Do you have a history of heart failure, kidney disease, liver failure, or cancer?\"      none  9. RECURRENT SYMPTOM: \"Have you had leg swelling before?\" If so, ask: \"When was the last time?\" \"What happened that time?\"      none  10. OTHER SYMPTOMS: \"Do you have any other symptoms?\" (e.g., chest pain, difficulty breathing)        none  11. PREGNANCY: \"Is there any chance you are pregnant?\" \"When was your last menstrual period?\"        none    Protocols used: LEG SWELLING AND EDEMA-A-OH      "

## 2022-02-15 LAB
ALBUMIN SERPL-MCNC: 3.5 G/DL (ref 3.4–5)
ALP SERPL-CCNC: 80 U/L (ref 40–150)
ALT SERPL W P-5'-P-CCNC: 25 U/L (ref 0–50)
ANION GAP SERPL CALCULATED.3IONS-SCNC: 7 MMOL/L (ref 3–14)
AST SERPL W P-5'-P-CCNC: 11 U/L (ref 0–45)
BILIRUB SERPL-MCNC: 0.2 MG/DL (ref 0.2–1.3)
BUN SERPL-MCNC: 13 MG/DL (ref 7–30)
CALCIUM SERPL-MCNC: 9.1 MG/DL (ref 8.5–10.1)
CHLORIDE BLD-SCNC: 108 MMOL/L (ref 94–109)
CO2 SERPL-SCNC: 25 MMOL/L (ref 20–32)
CREAT SERPL-MCNC: 0.94 MG/DL (ref 0.52–1.04)
GFR SERPL CREATININE-BSD FRML MDRD: 79 ML/MIN/1.73M2
GLUCOSE BLD-MCNC: 86 MG/DL (ref 70–99)
POTASSIUM BLD-SCNC: 4.1 MMOL/L (ref 3.4–5.3)
PROT SERPL-MCNC: 6.5 G/DL (ref 6.8–8.8)
SODIUM SERPL-SCNC: 140 MMOL/L (ref 133–144)

## 2022-04-04 ENCOUNTER — VIRTUAL VISIT (OUTPATIENT)
Dept: PEDIATRICS | Facility: CLINIC | Age: 37
End: 2022-04-04
Payer: COMMERCIAL

## 2022-04-04 DIAGNOSIS — Z91.199 NO-SHOW FOR APPOINTMENT: Primary | ICD-10-CM

## 2022-04-09 ENCOUNTER — HEALTH MAINTENANCE LETTER (OUTPATIENT)
Age: 37
End: 2022-04-09

## 2022-05-18 ENCOUNTER — TELEPHONE (OUTPATIENT)
Dept: PEDIATRICS | Facility: CLINIC | Age: 37
End: 2022-05-18
Payer: COMMERCIAL

## 2022-05-18 NOTE — TELEPHONE ENCOUNTER
Patient Quality Outreach      Summary:    Patient has the following on her problem list/HM:   Asthma review     No flowsheet data found.       Patient is due/failing the following:   Asthma  -  ACT needed    Type of outreach:    Sent [employer] message.    Questions for provider review:    None                                                                                                                                     Krys Hyman CMA on 5/18/2022 at 5:01 PM

## 2022-05-20 PROBLEM — J45.901 ASTHMA WITH EXACERBATION: Status: RESOLVED | Noted: 2022-02-14 | Resolved: 2022-05-20

## 2022-06-04 ENCOUNTER — HEALTH MAINTENANCE LETTER (OUTPATIENT)
Age: 37
End: 2022-06-04

## 2022-07-14 SDOH — ECONOMIC STABILITY: FOOD INSECURITY: WITHIN THE PAST 12 MONTHS, YOU WORRIED THAT YOUR FOOD WOULD RUN OUT BEFORE YOU GOT MONEY TO BUY MORE.: NEVER TRUE

## 2022-07-14 SDOH — ECONOMIC STABILITY: INCOME INSECURITY: IN THE LAST 12 MONTHS, WAS THERE A TIME WHEN YOU WERE NOT ABLE TO PAY THE MORTGAGE OR RENT ON TIME?: NO

## 2022-07-14 SDOH — ECONOMIC STABILITY: FOOD INSECURITY: WITHIN THE PAST 12 MONTHS, THE FOOD YOU BOUGHT JUST DIDN'T LAST AND YOU DIDN'T HAVE MONEY TO GET MORE.: NEVER TRUE

## 2022-07-14 SDOH — ECONOMIC STABILITY: TRANSPORTATION INSECURITY
IN THE PAST 12 MONTHS, HAS THE LACK OF TRANSPORTATION KEPT YOU FROM MEDICAL APPOINTMENTS OR FROM GETTING MEDICATIONS?: NO

## 2022-07-14 SDOH — ECONOMIC STABILITY: TRANSPORTATION INSECURITY
IN THE PAST 12 MONTHS, HAS LACK OF TRANSPORTATION KEPT YOU FROM MEETINGS, WORK, OR FROM GETTING THINGS NEEDED FOR DAILY LIVING?: NO

## 2022-07-14 SDOH — HEALTH STABILITY: PHYSICAL HEALTH: ON AVERAGE, HOW MANY DAYS PER WEEK DO YOU ENGAGE IN MODERATE TO STRENUOUS EXERCISE (LIKE A BRISK WALK)?: 1 DAY

## 2022-07-14 SDOH — ECONOMIC STABILITY: INCOME INSECURITY: HOW HARD IS IT FOR YOU TO PAY FOR THE VERY BASICS LIKE FOOD, HOUSING, MEDICAL CARE, AND HEATING?: NOT HARD AT ALL

## 2022-07-14 SDOH — HEALTH STABILITY: PHYSICAL HEALTH: ON AVERAGE, HOW MANY MINUTES DO YOU ENGAGE IN EXERCISE AT THIS LEVEL?: 60 MIN

## 2022-07-14 ASSESSMENT — ENCOUNTER SYMPTOMS
NAUSEA: 0
JOINT SWELLING: 0
WEAKNESS: 0
BREAST MASS: 0
HEADACHES: 0
HEARTBURN: 1
ARTHRALGIAS: 1
COUGH: 0
DIZZINESS: 1
PARESTHESIAS: 0
EYE PAIN: 0
FREQUENCY: 0
SHORTNESS OF BREATH: 0
HEMATURIA: 0
PALPITATIONS: 0
CHILLS: 0
DYSURIA: 0
FEVER: 0
MYALGIAS: 0
NERVOUS/ANXIOUS: 1
CONSTIPATION: 0
SORE THROAT: 0
ABDOMINAL PAIN: 1
HEMATOCHEZIA: 0
DIARRHEA: 0

## 2022-07-14 ASSESSMENT — PATIENT HEALTH QUESTIONNAIRE - PHQ9
SUM OF ALL RESPONSES TO PHQ QUESTIONS 1-9: 14
10. IF YOU CHECKED OFF ANY PROBLEMS, HOW DIFFICULT HAVE THESE PROBLEMS MADE IT FOR YOU TO DO YOUR WORK, TAKE CARE OF THINGS AT HOME, OR GET ALONG WITH OTHER PEOPLE: SOMEWHAT DIFFICULT
SUM OF ALL RESPONSES TO PHQ QUESTIONS 1-9: 14

## 2022-07-14 ASSESSMENT — SOCIAL DETERMINANTS OF HEALTH (SDOH)
DO YOU BELONG TO ANY CLUBS OR ORGANIZATIONS SUCH AS CHURCH GROUPS UNIONS, FRATERNAL OR ATHLETIC GROUPS, OR SCHOOL GROUPS?: NO
HOW OFTEN DO YOU ATTEND CHURCH OR RELIGIOUS SERVICES?: MORE THAN 4 TIMES PER YEAR
HOW OFTEN DO YOU GET TOGETHER WITH FRIENDS OR RELATIVES?: ONCE A WEEK
IN A TYPICAL WEEK, HOW MANY TIMES DO YOU TALK ON THE PHONE WITH FAMILY, FRIENDS, OR NEIGHBORS?: MORE THAN THREE TIMES A WEEK

## 2022-07-14 ASSESSMENT — LIFESTYLE VARIABLES
AUDIT-C TOTAL SCORE: 1
HOW OFTEN DO YOU HAVE SIX OR MORE DRINKS ON ONE OCCASION: NEVER
HOW MANY STANDARD DRINKS CONTAINING ALCOHOL DO YOU HAVE ON A TYPICAL DAY: 1 OR 2
SKIP TO QUESTIONS 9-10: 1
HOW OFTEN DO YOU HAVE A DRINK CONTAINING ALCOHOL: MONTHLY OR LESS

## 2022-07-15 ENCOUNTER — OFFICE VISIT (OUTPATIENT)
Dept: PEDIATRICS | Facility: CLINIC | Age: 37
End: 2022-07-15
Payer: COMMERCIAL

## 2022-07-15 VITALS
SYSTOLIC BLOOD PRESSURE: 102 MMHG | BODY MASS INDEX: 49.47 KG/M2 | RESPIRATION RATE: 18 BRPM | WEIGHT: 289.8 LBS | HEIGHT: 64 IN | HEART RATE: 71 BPM | OXYGEN SATURATION: 100 % | TEMPERATURE: 97.9 F | DIASTOLIC BLOOD PRESSURE: 70 MMHG

## 2022-07-15 DIAGNOSIS — E28.2 PCOS (POLYCYSTIC OVARIAN SYNDROME): ICD-10-CM

## 2022-07-15 DIAGNOSIS — E03.9 HYPOTHYROIDISM, UNSPECIFIED TYPE: ICD-10-CM

## 2022-07-15 DIAGNOSIS — R53.83 OTHER FATIGUE: ICD-10-CM

## 2022-07-15 DIAGNOSIS — Z00.01 ENCOUNTER FOR GENERAL ADULT MEDICAL EXAMINATION WITH ABNORMAL FINDINGS: Primary | ICD-10-CM

## 2022-07-15 DIAGNOSIS — K21.9 GASTROESOPHAGEAL REFLUX DISEASE WITHOUT ESOPHAGITIS: ICD-10-CM

## 2022-07-15 DIAGNOSIS — F41.9 ANXIETY: ICD-10-CM

## 2022-07-15 DIAGNOSIS — F33.1 MAJOR DEPRESSIVE DISORDER, RECURRENT EPISODE, MODERATE (H): ICD-10-CM

## 2022-07-15 DIAGNOSIS — Z13.6 SCREENING FOR CARDIOVASCULAR CONDITION: ICD-10-CM

## 2022-07-15 PROBLEM — R73.03 PREDIABETES: Status: ACTIVE | Noted: 2022-07-15

## 2022-07-15 LAB
BASOPHILS # BLD AUTO: 0 10E3/UL (ref 0–0.2)
BASOPHILS NFR BLD AUTO: 0 %
EOSINOPHIL # BLD AUTO: 0.1 10E3/UL (ref 0–0.7)
EOSINOPHIL NFR BLD AUTO: 2 %
ERYTHROCYTE [DISTWIDTH] IN BLOOD BY AUTOMATED COUNT: 14.4 % (ref 10–15)
HBA1C MFR BLD: 5.7 % (ref 0–5.6)
HCT VFR BLD AUTO: 40 % (ref 35–47)
HGB BLD-MCNC: 12.9 G/DL (ref 11.7–15.7)
LYMPHOCYTES # BLD AUTO: 1.3 10E3/UL (ref 0.8–5.3)
LYMPHOCYTES NFR BLD AUTO: 18 %
MCH RBC QN AUTO: 28.8 PG (ref 26.5–33)
MCHC RBC AUTO-ENTMCNC: 32.3 G/DL (ref 31.5–36.5)
MCV RBC AUTO: 89 FL (ref 78–100)
MONOCYTES # BLD AUTO: 0.4 10E3/UL (ref 0–1.3)
MONOCYTES NFR BLD AUTO: 5 %
NEUTROPHILS # BLD AUTO: 5.1 10E3/UL (ref 1.6–8.3)
NEUTROPHILS NFR BLD AUTO: 74 %
PLATELET # BLD AUTO: 187 10E3/UL (ref 150–450)
RBC # BLD AUTO: 4.48 10E6/UL (ref 3.8–5.2)
WBC # BLD AUTO: 6.9 10E3/UL (ref 4–11)

## 2022-07-15 PROCEDURE — 36415 COLL VENOUS BLD VENIPUNCTURE: CPT | Performed by: PEDIATRICS

## 2022-07-15 PROCEDURE — 99395 PREV VISIT EST AGE 18-39: CPT | Performed by: PEDIATRICS

## 2022-07-15 PROCEDURE — 80050 GENERAL HEALTH PANEL: CPT | Performed by: PEDIATRICS

## 2022-07-15 PROCEDURE — 80061 LIPID PANEL: CPT | Performed by: PEDIATRICS

## 2022-07-15 PROCEDURE — 82306 VITAMIN D 25 HYDROXY: CPT | Performed by: PEDIATRICS

## 2022-07-15 PROCEDURE — 99214 OFFICE O/P EST MOD 30 MIN: CPT | Mod: 25 | Performed by: PEDIATRICS

## 2022-07-15 PROCEDURE — 83036 HEMOGLOBIN GLYCOSYLATED A1C: CPT | Performed by: PEDIATRICS

## 2022-07-15 PROCEDURE — 82728 ASSAY OF FERRITIN: CPT | Performed by: PEDIATRICS

## 2022-07-15 RX ORDER — LEVOTHYROXINE SODIUM 75 UG/1
75 TABLET ORAL DAILY
Qty: 30 TABLET | Refills: 11 | Status: SHIPPED | OUTPATIENT
Start: 2022-07-15 | End: 2023-07-20

## 2022-07-15 RX ORDER — BUPROPION HYDROCHLORIDE 200 MG/1
200 TABLET, EXTENDED RELEASE ORAL 2 TIMES DAILY
Qty: 60 TABLET | Refills: 5 | Status: SHIPPED | OUTPATIENT
Start: 2022-07-15 | End: 2023-05-15

## 2022-07-15 RX ORDER — ESCITALOPRAM OXALATE 20 MG/1
TABLET ORAL
Qty: 45 TABLET | Refills: 6 | Status: SHIPPED | OUTPATIENT
Start: 2022-07-15 | End: 2023-05-15

## 2022-07-15 RX ORDER — OMEPRAZOLE 40 MG/1
40 CAPSULE, DELAYED RELEASE ORAL DAILY
Qty: 90 CAPSULE | Refills: 3 | Status: SHIPPED | OUTPATIENT
Start: 2022-07-15 | End: 2022-10-26

## 2022-07-15 ASSESSMENT — ENCOUNTER SYMPTOMS
PARESTHESIAS: 0
HEMATURIA: 0
MYALGIAS: 0
HEARTBURN: 1
CONSTIPATION: 0
ABDOMINAL PAIN: 1
WEAKNESS: 0
DYSURIA: 0
EYE PAIN: 0
NAUSEA: 0
SHORTNESS OF BREATH: 0
ARTHRALGIAS: 1
FREQUENCY: 0
PALPITATIONS: 0
SORE THROAT: 0
HEADACHES: 0
FEVER: 0
HEMATOCHEZIA: 0
NERVOUS/ANXIOUS: 1
DIARRHEA: 0
DIZZINESS: 1
BREAST MASS: 0
CHILLS: 0
COUGH: 0
JOINT SWELLING: 0

## 2022-07-15 ASSESSMENT — PAIN SCALES - GENERAL: PAINLEVEL: NO PAIN (0)

## 2022-07-15 NOTE — PATIENT INSTRUCTIONS

## 2022-07-15 NOTE — PROGRESS NOTES
SUBJECTIVE:   CC: Eloina Aj is an 37 year old woman who presents for preventive health visit.   Patient has been advised of split billing requirements and indicates understanding: Yes     Healthy Habits:     Getting at least 3 servings of Calcium per day:  NO    Bi-annual eye exam:  Yes    Dental care twice a year:  Yes    Sleep apnea or symptoms of sleep apnea:  Daytime drowsiness    Diet:  Regular (no restrictions)    Frequency of exercise:  1 day/week    Duration of exercise:  45-60 minutes    Taking medications regularly:  Yes    Medication side effects:  None    PHQ-2 Total Score: 6    Last appt 10/21 - anxiety/depression - wellbutrin and lexapro - considered buspar in the past, but main stressor was kids acitvities and this was now slowing down.    Today's PHQ-2 Score:   PHQ-2 ( 1999 Pf[employer]) 7/14/2022   Q1: Little interest or pleasure in doing things 3   Q2: Feeling down, depressed or hopeless 3   PHQ-2 Score 6   PHQ-2 Total Score (12-17 Years)- Positive if 3 or more points; Administer PHQ-A if positive -   Q1: Little interest or pleasure in doing things Nearly every day   Q2: Feeling down, depressed or hopeless Nearly every day   PHQ-2 Score 6     Very low energy, doesn't want to do much.  Feels like she wants to fall asleep by 1pm.   says she snores. Sleeps 6-8 hours.     Weight - gaining. Making depression worse.     Activity - mows lawn with hills, tries push it.  Activity increased.    Abuse: Current or Past (Physical, Sexual or Emotional) - No  Do you feel safe in your environment? Yes        Social History     Tobacco Use     Smoking status: Never Smoker     Smokeless tobacco: Never Used   Substance Use Topics     Alcohol use: No         Alcohol Use 7/14/2022   Prescreen: >3 drinks/day or >7 drinks/week? No   Prescreen: >3 drinks/day or >7 drinks/week? -       Reviewed orders with patient.  Reviewed health maintenance and updated orders accordingly -       Breast Cancer  "Screening:    Breast CA Risk Assessment (FHS-7) 7/14/2022   Do you have a family history of breast, colon, or ovarian cancer? No / Unknown         Patient under 40 years of age: Routine Mammogram Screening not recommended.   Pertinent mammograms are reviewed under the imaging tab.    History of abnormal Pap smear: NO - age 30-65 PAP every 5 years with negative HPV co-testing recommended     Reviewed and updated as needed this visit by clinical staff   Tobacco  Allergies  Meds  Problems  Med Hx  Surg Hx  Fam Hx  Soc   Hx          Reviewed and updated as needed this visit by Provider      Problems                  Review of Systems   Constitutional: Negative for chills and fever.   HENT: Negative for congestion, ear pain, hearing loss and sore throat.    Eyes: Negative for pain and visual disturbance.   Respiratory: Negative for cough and shortness of breath.    Cardiovascular: Positive for peripheral edema. Negative for chest pain and palpitations.   Gastrointestinal: Positive for abdominal pain and heartburn. Negative for constipation, diarrhea, hematochezia and nausea.   Breasts:  Negative for tenderness, breast mass and discharge.   Genitourinary: Negative for dysuria, frequency, genital sores, hematuria, pelvic pain, urgency, vaginal bleeding and vaginal discharge.   Musculoskeletal: Positive for arthralgias. Negative for joint swelling and myalgias.   Skin: Negative for rash.   Neurological: Positive for dizziness. Negative for weakness, headaches and paresthesias.   Psychiatric/Behavioral: Positive for mood changes. The patient is nervous/anxious.           OBJECTIVE:   /70 (BP Location: Right arm, Patient Position: Sitting, Cuff Size: Adult Large)   Pulse 71   Temp 97.9  F (36.6  C) (Tympanic)   Resp 18   Ht 1.635 m (5' 4.37\")   Wt 131.5 kg (289 lb 12.8 oz)   LMP 07/10/2022   SpO2 100%   BMI 49.17 kg/m    Physical Exam  GENERAL: healthy, alert and no distress  EYES: Eyes grossly normal to " inspection, PERRL and conjunctivae and sclerae normal  HENT: ear canals and TM's normal, nose and mouth without ulcers or lesions  NECK: no adenopathy, no asymmetry, masses, or scars and thyroid normal to palpation  RESP: lungs clear to auscultation - no rales, rhonchi or wheezes  CV: regular rate and rhythm, normal S1 S2, no S3 or S4, no murmur, click or rub, no peripheral edema and peripheral pulses strong  ABDOMEN: soft, nontender, no hepatosplenomegaly, no masses and bowel sounds normal  MS: no gross musculoskeletal defects noted, no edema  SKIN: no suspicious lesions or rashes  NEURO: Normal strength and tone, mentation intact and speech normal  PSYCH: mentation appears normal, affect normal/bright    Diagnostic Test Results:  Labs reviewed in Epic    ASSESSMENT/PLAN:     Problem List Items Addressed This Visit     Anxiety    Relevant Medications    escitalopram (LEXAPRO) 20 MG tablet    buPROPion (WELLBUTRIN SR) 200 MG 12 hr tablet    Hypothyroidism, unspecified type     Weight gain, will recheck TSH           Relevant Medications    levothyroxine (SYNTHROID/LEVOTHROID) 75 MCG tablet    Major depressive disorder, recurrent episode, moderate (H)     Currently taking Wellbutrin and Lexapro - monitor for seratonin syndrome.           Relevant Medications    escitalopram (LEXAPRO) 20 MG tablet    buPROPion (WELLBUTRIN SR) 200 MG 12 hr tablet    PCOS (polycystic ovarian syndrome)     Takes metformin for this.  Plan on yearly A1C.           Relevant Medications    metFORMIN (GLUCOPHAGE) 500 MG tablet    Other Relevant Orders    HEMOGLOBIN A1C      Other Visit Diagnoses     Encounter for general adult medical examination with abnormal findings    -  Primary    Gastroesophageal reflux disease without esophagitis        Relevant Medications    omeprazole (PRILOSEC) 40 MG DR capsule    Other fatigue        Will check labs as below.  Snoring also concerning for possible DK. If all labs normal, plan to refer to sleep  "medicine.    Relevant Orders    Vitamin D Deficiency    Comprehensive metabolic panel (BMP + Alb, Alk Phos, ALT, AST, Total. Bili, TP)    CBC with platelets and differential    Ferritin    TSH with free T4 reflex    Screening for cardiovascular condition        Relevant Orders    Lipid panel reflex to direct LDL Fasting    Body mass index (BMI) of 40.0-44.9 in adult (H)        Relevant Medications    metFORMIN (GLUCOPHAGE) 500 MG tablet        Pap smear done on this date: 2/4/2022 (approximately), by this group: [facility], results were normal.     Patient has been advised of split billing requirements and indicates understanding: Yes    COUNSELING:  Reviewed preventive health counseling, as reflected in patient instructions    Estimated body mass index is 49.17 kg/m  as calculated from the following:    Height as of this encounter: 1.635 m (5' 4.37\").    Weight as of this encounter: 131.5 kg (289 lb 12.8 oz).        She reports that she has never smoked. She has never used smokeless tobacco.      Counseling Resources:  ATP IV Guidelines  Pooled Cohorts Equation Calculator  Breast Cancer Risk Calculator  BRCA-Related Cancer Risk Assessment: FHS-7 Tool  FRAX Risk Assessment  ICSI Preventive Guidelines  Dietary Guidelines for Americans, 2010  USDA's MyPlate  ASA Prophylaxis  Lung CA Screening    Cortney Silveira MD  [facility] JEAN MARIE  Answers for HPI/ROS submitted by the patient on 7/14/2022  If you checked off any problems, how difficult have these problems made it for you to do your work, take care of things at home, or get along with other people?: Somewhat difficult  PHQ9 TOTAL SCORE: 14      "

## 2022-07-16 LAB
ALBUMIN SERPL-MCNC: 3.5 G/DL (ref 3.4–5)
ALP SERPL-CCNC: 77 U/L (ref 40–150)
ALT SERPL W P-5'-P-CCNC: 25 U/L (ref 0–50)
ANION GAP SERPL CALCULATED.3IONS-SCNC: 8 MMOL/L (ref 3–14)
AST SERPL W P-5'-P-CCNC: 15 U/L (ref 0–45)
BILIRUB SERPL-MCNC: 0.3 MG/DL (ref 0.2–1.3)
BUN SERPL-MCNC: 11 MG/DL (ref 7–30)
CALCIUM SERPL-MCNC: 8.4 MG/DL (ref 8.5–10.1)
CHLORIDE BLD-SCNC: 105 MMOL/L (ref 94–109)
CHOLEST SERPL-MCNC: 203 MG/DL
CO2 SERPL-SCNC: 26 MMOL/L (ref 20–32)
CREAT SERPL-MCNC: 0.9 MG/DL (ref 0.52–1.04)
FASTING STATUS PATIENT QL REPORTED: YES
FERRITIN SERPL-MCNC: 33 NG/ML (ref 12–150)
GFR SERPL CREATININE-BSD FRML MDRD: 83 ML/MIN/1.73M2
GLUCOSE BLD-MCNC: 98 MG/DL (ref 70–99)
HDLC SERPL-MCNC: 55 MG/DL
LDLC SERPL CALC-MCNC: 121 MG/DL
NONHDLC SERPL-MCNC: 148 MG/DL
POTASSIUM BLD-SCNC: 4.1 MMOL/L (ref 3.4–5.3)
PROT SERPL-MCNC: 6.5 G/DL (ref 6.8–8.8)
SODIUM SERPL-SCNC: 139 MMOL/L (ref 133–144)
TRIGL SERPL-MCNC: 135 MG/DL

## 2022-07-17 LAB — TSH SERPL DL<=0.005 MIU/L-ACNC: 2.36 MU/L (ref 0.4–4)

## 2022-07-18 LAB — DEPRECATED CALCIDIOL+CALCIFEROL SERPL-MC: 27 UG/L (ref 20–75)

## 2022-10-10 ENCOUNTER — HEALTH MAINTENANCE LETTER (OUTPATIENT)
Age: 37
End: 2022-10-10

## 2022-10-25 ENCOUNTER — VIRTUAL VISIT (OUTPATIENT)
Dept: URGENT CARE | Facility: CLINIC | Age: 37
End: 2022-10-25
Payer: COMMERCIAL

## 2022-10-25 DIAGNOSIS — R05.1 ACUTE COUGH: Primary | ICD-10-CM

## 2022-10-25 PROCEDURE — 99213 OFFICE O/P EST LOW 20 MIN: CPT | Mod: CS | Performed by: EMERGENCY MEDICINE

## 2022-10-25 RX ORDER — PREDNISONE 50 MG/1
50 TABLET ORAL DAILY
Qty: 5 TABLET | Refills: 0 | Status: SHIPPED | OUTPATIENT
Start: 2022-10-25 | End: 2022-10-30

## 2022-10-25 RX ORDER — ALBUTEROL SULFATE 90 UG/1
2 AEROSOL, METERED RESPIRATORY (INHALATION) EVERY 4 HOURS PRN
Qty: 18 G | Refills: 1 | Status: SHIPPED | OUTPATIENT
Start: 2022-10-25

## 2022-10-25 NOTE — PROGRESS NOTES
Video visit:  Start time: 12:34 PM  Stop time: 12:39 PM  Time: 5 minutes  Patient location: Home  Provider location: [facility] virtual provider (remote).  Platform used for video visit: Bruce    CHIEF COMPLAINT: Cough.      HPI: Patient is a 37-year-old female whose had a 3-day history of URI type symptoms with nasal congestion sore throat and cough.  She now has some thickened phlegm.  She has a history of reactive airway symptoms with viral URIs.  She is noticing some wheezing but does not have an inhaler which she has used in the past.  She has tested for COVID x1 which was negative.      ROS: See HPI otherwise normal.    Allergies   Allergen Reactions     Atarax [Hydroxyzine] Palpitations     Sulfa Drugs Swelling and Rash      Current Outpatient Medications   Medication Sig Dispense Refill     albuterol (PROAIR HFA/PROVENTIL HFA/VENTOLIN HFA) 108 (90 Base) MCG/ACT inhaler Inhale 2 puffs into the lungs every 4 hours as needed for shortness of breath / dyspnea or wheezing 18 g 1     predniSONE (DELTASONE) 50 MG tablet Take 1 tablet (50 mg) by mouth daily for 5 days 5 tablet 0     buPROPion (WELLBUTRIN SR) 200 MG 12 hr tablet Take 1 tablet (200 mg) by mouth 2 times daily 60 tablet 5     escitalopram (LEXAPRO) 20 MG tablet TAKE 1 AND 1/2 TABLETS BY MOUTH DAILY 45 tablet 6     JUNEL FE 1/20 1-20 MG-MCG per tablet Take 1 tablet by mouth daily  3     levothyroxine (SYNTHROID/LEVOTHROID) 75 MCG tablet Take 1 tablet (75 mcg) by mouth daily 30 tablet 11     metFORMIN (GLUCOPHAGE) 500 MG tablet Take 1 tablet (500 mg) by mouth 2 times daily (with meals) 60 tablet 11     omeprazole (PRILOSEC) 40 MG DR capsule Take 1 capsule (40 mg) by mouth daily 90 capsule 3     triamcinolone (KENALOG) 0.1 % external cream Apply topically 3 times daily as needed (psoriasis) 30 g 1     Vitamin D, Cholecalciferol, 25 MCG (1000 UT) TABS Take 1 tablet by mouth daily           PE: No acute distress on video visit.  She is nondyspneic  appearing.        TREATMENT: None.      ASSESSMENT: Viral URI symptoms in nature.  Reactive airway component is recurrent for this patient.  We will treat reactive airway component and assume viral with close monitoring over the next short week.  Recommended patient repeat COVID testing 1-2 more times.      DIAGNOSIS: Cough.  Wheezing.      PLAN: Albuterol, prednisone as instructed.  Repeat COVID testing tomorrow.  Reconnect with medical care if any worsening symptoms or no improvement in 4 to 5 days.

## 2022-10-26 ENCOUNTER — VIRTUAL VISIT (OUTPATIENT)
Dept: FAMILY MEDICINE | Facility: CLINIC | Age: 37
End: 2022-10-26
Payer: COMMERCIAL

## 2022-10-26 DIAGNOSIS — U07.1 INFECTION DUE TO 2019 NOVEL CORONAVIRUS: Primary | ICD-10-CM

## 2022-10-26 PROCEDURE — 99213 OFFICE O/P EST LOW 20 MIN: CPT | Mod: CS | Performed by: NURSE PRACTITIONER

## 2022-10-26 ASSESSMENT — PATIENT HEALTH QUESTIONNAIRE - PHQ9
SUM OF ALL RESPONSES TO PHQ QUESTIONS 1-9: 12
10. IF YOU CHECKED OFF ANY PROBLEMS, HOW DIFFICULT HAVE THESE PROBLEMS MADE IT FOR YOU TO DO YOUR WORK, TAKE CARE OF THINGS AT HOME, OR GET ALONG WITH OTHER PEOPLE: NOT DIFFICULT AT ALL
SUM OF ALL RESPONSES TO PHQ QUESTIONS 1-9: 12

## 2022-10-26 NOTE — PROGRESS NOTES
"Eloina is a 37 year old who is being evaluated via a billable video visit.      How would you like to obtain your AVS? MyChart  If the video visit is dropped, the invitation should be resent by: Text to cell phone: [phone]  Will anyone else be joining your video visit? No          Assessment & Plan     (U07.1) Infection due to 2019 novel coronavirus  (primary encounter diagnosis)  Comment:   Plan: nirmatrelvir and ritonavir (PAXLOVID) therapy         pack, nirmatrelvir and ritonavir (PAXLOVID)         therapy pack                       BMI:   Estimated body mass index is 49.17 kg/m  as calculated from the following:    Height as of 7/15/22: 1.635 m (5' 4.37\").    Weight as of 7/15/22: 131.5 kg (289 lb 12.8 oz).           No follow-ups on file.    Melody Tang NP  [facility]    Subjective   Eloina is a 37 year old presenting for the following health issues: tested positive for COVID this morning (10/26/22), symptoms started on 10/23/22, c/o cough, sinus congestion, facial pain, sore throat, diarrhea, \"run down\"    symjptoms worsening over last 3 days  Gets recurrent pneumonia, given prednisone and albuterol yesterday  Has had only 2 covid vaccines   and kids live at home with her  Desires antiviral to reduce risks  Covid Concern, Cough, and Sinus Problem (congestion)      HPI           Review of Systems         Objective           Vitals:  No vitals were obtained today due to virtual visit.    Physical Exam   GENERAL: Healthy, alert and no distress  EYES: Eyes grossly normal to inspection.  No discharge or erythema, or obvious scleral/conjunctival abnormalities.  RESP: No audible wheeze, cough, or visible cyanosis.  No visible retractions or increased work of breathing.    SKIN: Visible skin clear. No significant rash, abnormal pigmentation or lesions.  NEURO: Cranial nerves grossly intact.  Mentation and speech appropriate for age.  PSYCH: Mentation appears normal, affect " normal/bright, judgement and insight intact, normal speech and appearance well-groomed.                Video-Visit Details    Video Start Time: 910    Type of service:  Video Visit    Video End Time:925    Originating Location (pt. Location): Home        Distant Location (provider location):  On-site    Platform used for Video Visit: "[employer]"    Answers for HPI/ROS submitted by the patient on 10/26/2022  If you checked off any problems, how difficult have these problems made it for you to do your work, take care of things at home, or get along with other people?: Not difficult at all  PHQ9 TOTAL SCORE: 12

## 2022-11-14 ENCOUNTER — VIRTUAL VISIT (OUTPATIENT)
Dept: PEDIATRICS | Facility: CLINIC | Age: 37
End: 2022-11-14
Payer: COMMERCIAL

## 2022-11-14 DIAGNOSIS — R53.83 OTHER FATIGUE: ICD-10-CM

## 2022-11-14 DIAGNOSIS — D73.4 CYST OF SPLEEN: Primary | ICD-10-CM

## 2022-11-14 PROCEDURE — 99214 OFFICE O/P EST MOD 30 MIN: CPT | Mod: 95 | Performed by: PEDIATRICS

## 2022-11-14 NOTE — PROGRESS NOTES
"Eloina is a 37 year old who is being evaluated via a billable video visit.      How would you like to obtain your AVS? MyChart  If the video visit is dropped, the invitation should be resent by: Text to cell phone: [phone]  Will anyone else be joining your video visit? No        Assessment & Plan     Cyst of spleen  Patient previously had large spleen cyst decompressed (About a year ago) - she is having similar symptoms again - unfortuantely could not examine in person, however given her history I recommend proceeding with imaging.   - CT Abdomen Pelvis w Contrast; Future    Other fatigue  Labs in July showed new prediabetes.  I suspect most of this is from diet - if all labs are normal, but discuss more protein sources into diet.   - Hemoglobin A1c; Future  - Ferritin; Future  - Comprehensive metabolic panel (BMP + Alb, Alk Phos, ALT, AST, Total. Bili, TP); Future    Morbid obesity - will discuss diet as above after labs returned.           BMI:   Estimated body mass index is 49.17 kg/m  as calculated from the following:    Height as of 7/15/22: 1.635 m (5' 4.37\").    Weight as of 7/15/22: 131.5 kg (289 lb 12.8 oz).       Patient Instructions   Hi Eloina,    It was nice to see you today.    For the CT scan,  If you don't hear from someone in 1-2 days, please call radiology department at [phone] to schedule your test at [facility].    Please make a nonfasting lab only appt.    Cortney Silveira MD  Internal Medicine/Pediatrics  [facility]              Return in about 8 months (around 7/14/2023) for Routine preventive, with me, in person.    Cortney Silveira MD  [facility]    Subjective   Eloina is a 37 year old, presenting for the following health issues:  No chief complaint on file.      History of Present Illness       Reason for visit:  Always tired in the afternoons and dizzy/lightheaded.  Exhausted after doing small tasks. Also to request an imaging of my spleen " "due to having some of the same issues. Also frequent migraines    She eats 0-1 servings of fruits and vegetables daily.She consumes 0 sweetened beverage(s) daily.She exercises with enough effort to increase her heart rate 9 or less minutes per day.  She exercises with enough effort to increase her heart rate 3 or less days per week.   She is taking medications regularly.     Regarding spleen - patient feeling bloating, fullness after eating and sometimes feels like she was kicked there.     Patient feels that for a \"long time\" - around 1pm, patient gets lightheaded, dizzy, very tired, no energy, no motivation.    Sleep: 8 hours, sometimes wakes up, but mostly good  Diet: breakfast, cereal, coffee - mostly in AM - 3 cups  Lunch: not much  Dinner: hamburger casserole with veggies    Had COVID - 3 weeks ago, but symptoms started long before this            Review of Systems         Objective           Vitals:  No vitals were obtained today due to virtual visit.    Physical Exam   GENERAL: Healthy, alert and no distress  EYES: Eyes grossly normal to inspection.  No discharge or erythema, or obvious scleral/conjunctival abnormalities.  RESP: No audible wheeze, cough, or visible cyanosis.  No visible retractions or increased work of breathing.    SKIN: Visible skin clear. No significant rash, abnormal pigmentation or lesions.  NEURO: Cranial nerves grossly intact.  Mentation and speech appropriate for age.  PSYCH: Mentation appears normal, affect normal/bright, judgement and insight intact, normal speech and appearance well-groomed.                Video-Visit Details    Video Start Time: 8:55am    Type of service:  Video Visit    Video End Time:9:05 AM    Originating Location (pt. Location): Home    Distant Location (provider location):  Off-site    Platform used for Video Visit: [employer]"

## 2022-11-14 NOTE — PATIENT INSTRUCTIONS
Roel Duvall,    It was nice to see you today.    For the CT scan,  If you don't hear from someone in 1-2 days, please call radiology department at [phone] to schedule your test at [facility].    Please make a nonfasting lab only appt.    Cortney Silveira MD  Internal Medicine/Pediatrics  [facility]

## 2022-11-16 ENCOUNTER — LAB (OUTPATIENT)
Dept: LAB | Facility: CLINIC | Age: 37
End: 2022-11-16
Payer: COMMERCIAL

## 2022-11-16 DIAGNOSIS — R53.83 OTHER FATIGUE: ICD-10-CM

## 2022-11-16 LAB
ALBUMIN SERPL BCG-MCNC: 4.1 G/DL (ref 3.5–5.2)
ALP SERPL-CCNC: 75 U/L (ref 35–104)
ALT SERPL W P-5'-P-CCNC: 17 U/L (ref 10–35)
ANION GAP SERPL CALCULATED.3IONS-SCNC: 12 MMOL/L (ref 7–15)
AST SERPL W P-5'-P-CCNC: 21 U/L (ref 10–35)
BILIRUB SERPL-MCNC: 0.2 MG/DL
BUN SERPL-MCNC: 12.8 MG/DL (ref 6–20)
CALCIUM SERPL-MCNC: 8.7 MG/DL (ref 8.6–10)
CHLORIDE SERPL-SCNC: 105 MMOL/L (ref 98–107)
CREAT SERPL-MCNC: 1 MG/DL (ref 0.51–0.95)
DEPRECATED HCO3 PLAS-SCNC: 24 MMOL/L (ref 22–29)
FERRITIN SERPL-MCNC: 91 NG/ML (ref 6–175)
GFR SERPL CREATININE-BSD FRML MDRD: 73 ML/MIN/1.73M2
GLUCOSE SERPL-MCNC: 98 MG/DL (ref 70–99)
HBA1C MFR BLD: 6 % (ref 0–5.6)
POTASSIUM SERPL-SCNC: 4.2 MMOL/L (ref 3.4–5.3)
PROT SERPL-MCNC: 6.3 G/DL (ref 6.4–8.3)
SODIUM SERPL-SCNC: 141 MMOL/L (ref 136–145)

## 2022-11-16 PROCEDURE — 36415 COLL VENOUS BLD VENIPUNCTURE: CPT

## 2022-11-16 PROCEDURE — 82728 ASSAY OF FERRITIN: CPT

## 2022-11-16 PROCEDURE — 83036 HEMOGLOBIN GLYCOSYLATED A1C: CPT

## 2022-11-16 PROCEDURE — 80053 COMPREHEN METABOLIC PANEL: CPT

## 2022-11-17 ENCOUNTER — HOSPITAL ENCOUNTER (OUTPATIENT)
Dept: CT IMAGING | Facility: CLINIC | Age: 37
Discharge: HOME | End: 2022-11-17
Attending: PEDIATRICS | Admitting: PEDIATRICS
Payer: COMMERCIAL

## 2022-11-17 DIAGNOSIS — D73.4 CYST OF SPLEEN: ICD-10-CM

## 2022-11-17 PROCEDURE — 250N000011 HC RX IP 250 OP 636: Performed by: PEDIATRICS

## 2022-11-17 PROCEDURE — 250N000009 HC RX 250: Performed by: PEDIATRICS

## 2022-11-17 PROCEDURE — 74177 CT ABD & PELVIS W/CONTRAST: CPT

## 2022-11-17 RX ORDER — IOPAMIDOL 755 MG/ML
500 INJECTION, SOLUTION INTRAVASCULAR ONCE
Status: COMPLETED | OUTPATIENT
Start: 2022-11-17 | End: 2022-11-17

## 2022-11-17 RX ADMIN — IOPAMIDOL 100 ML: 755 INJECTION, SOLUTION INTRAVENOUS at 09:25

## 2022-11-17 RX ADMIN — SODIUM CHLORIDE 65 ML: 9 INJECTION, SOLUTION INTRAVENOUS at 09:25

## 2022-11-22 DIAGNOSIS — D22.9 MULTIPLE NEVI: Primary | ICD-10-CM

## 2023-01-19 ENCOUNTER — OFFICE VISIT (OUTPATIENT)
Dept: OTOLARYNGOLOGY | Facility: CLINIC | Age: 38
End: 2023-01-19
Payer: COMMERCIAL

## 2023-01-19 DIAGNOSIS — J37.0 CHRONIC LARYNGITIS: ICD-10-CM

## 2023-01-19 DIAGNOSIS — K21.9 LARYNGOPHARYNGEAL REFLUX (LPR): ICD-10-CM

## 2023-01-19 DIAGNOSIS — J32.4 CHRONIC PANSINUSITIS: Primary | ICD-10-CM

## 2023-01-19 DIAGNOSIS — R09.82 PND (POST-NASAL DRIP): ICD-10-CM

## 2023-01-19 PROCEDURE — 31575 DIAGNOSTIC LARYNGOSCOPY: CPT | Performed by: OTOLARYNGOLOGY

## 2023-01-19 PROCEDURE — 99203 OFFICE O/P NEW LOW 30 MIN: CPT | Mod: 25 | Performed by: OTOLARYNGOLOGY

## 2023-01-19 NOTE — LETTER
1/19/2023         RE: Eloina Aj  [address] [address] [address]        Dear Colleague,    Thank you for referring your patient, Eloina Aj, to the [facility]. Please see a copy of my visit note below.    HPI: This patient is a 38yo F who presents for evaluation of post-nasal drip. There has been PND for years. She has been on high doses of reflux medication for years, just recently stopped, for heartburn issues. It did help eliminate the heartburn, but she continued to have PND during this time. Also blows her nose quite frequently and has used nasal steroid sprays, again with limited efficacy.. Denies fevers, otalgia, weight loss, odynophagia, dysphagia, hemoptysis, and shortness of breath. She has not seen a GI physician, nor had an EGD; she has some medical history and was told that she was going to have reflux chronically by previous doctors after an appendectomy and splenectomy.    Past medical history, surgical history, social history, family history, medications, and allergies have been reviewed with the patient and are documented above.    Review of Systems: a 10-system review was performed. Pertinent positives are noted in the HPI and on a separate scanned document in the chart.    PHYSICAL EXAMINATION:  GEN: no acute distress, normocephalic. Obese.   EYES: extraocular movements are intact, pupils are equal and round. Sclera clear.   EARS: auricles are normally formed. The external auditory canals are clear with minimal to no cerumen. Tympanic membranes are intact bilaterally with no signs of infection, effusion, retractions, or perforations.  NOSE: anterior nares are patent. There are no masses or lesions. The septum is non-obstructing. No mucopurulence, no polyps.  OC/OP: clear, dentition is in good repair. The tongue and palate are fully mobile and symmetric. No masses or lesions. Cobblestoning of the posterior pharyngeal wall.  HP/L (scope):  nasopharynx, base of tongue, vallecula, epiglottis, and pyriform sinuses are clear. The bilateral vocal folds are mobile and without lesion. There is interarytenoid pachydermia and some hyperemia of the laryngeal surface of the epiglottis c/w LPR.  NECK: soft and supple. No lymphadenopathy or masses. Airway is midline.  NEURO: CN VII and XII symmetric. alert and oriented. No spontaneous nystagmus. Gait is normal.  PULM: breathing comfortably on room air, normal chest expansion with respiration  CARDS: no cyanosis or clubbing, normal carotid pulses    FLEXIBLE LARYNGOSCOPY: Scope inserted bilaterally to examine nasal tissue, nasopharynx, posterior oropharynx, hypopharynx, and larynx. See exam notes for exam finding details. Patient tolerated the procedure well.    MEDICAL DECISION-MAKING: This patient is a 36yo F with PND most likely from acid reflux and rhinorrhea most likely from allergic rhinitis. However, will obtain a CT of her sinuses to be sure. If negative, will refer to GI and Allergy.        Again, thank you for allowing me to participate in the care of your patient.        Sincerely,        Yeimy Quezada MD

## 2023-01-19 NOTE — PROGRESS NOTES
HPI: This patient is a 36yo F who presents for evaluation of post-nasal drip. There has been PND for years. She has been on high doses of reflux medication for years, just recently stopped, for heartburn issues. It did help eliminate the heartburn, but she continued to have PND during this time. Also blows her nose quite frequently and has used nasal steroid sprays, again with limited efficacy.. Denies fevers, otalgia, weight loss, odynophagia, dysphagia, hemoptysis, and shortness of breath. She has not seen a GI physician, nor had an EGD; she has some medical history and was told that she was going to have reflux chronically by previous doctors after an appendectomy and splenectomy.    Past medical history, surgical history, social history, family history, medications, and allergies have been reviewed with the patient and are documented above.    Review of Systems: a 10-system review was performed. Pertinent positives are noted in the HPI and on a separate scanned document in the chart.    PHYSICAL EXAMINATION:  GEN: no acute distress, normocephalic. Obese.   EYES: extraocular movements are intact, pupils are equal and round. Sclera clear.   EARS: auricles are normally formed. The external auditory canals are clear with minimal to no cerumen. Tympanic membranes are intact bilaterally with no signs of infection, effusion, retractions, or perforations.  NOSE: anterior nares are patent. There are no masses or lesions. The septum is non-obstructing. No mucopurulence, no polyps.  OC/OP: clear, dentition is in good repair. The tongue and palate are fully mobile and symmetric. No masses or lesions. Cobblestoning of the posterior pharyngeal wall.  HP/L (scope): nasopharynx, base of tongue, vallecula, epiglottis, and pyriform sinuses are clear. The bilateral vocal folds are mobile and without lesion. There is interarytenoid pachydermia and some hyperemia of the laryngeal surface of the epiglottis c/w LPR.  NECK: soft and  supple. No lymphadenopathy or masses. Airway is midline.  NEURO: CN VII and XII symmetric. alert and oriented. No spontaneous nystagmus. Gait is normal.  PULM: breathing comfortably on room air, normal chest expansion with respiration  CARDS: no cyanosis or clubbing, normal carotid pulses    FLEXIBLE LARYNGOSCOPY: Scope inserted bilaterally to examine nasal tissue, nasopharynx, posterior oropharynx, hypopharynx, and larynx. See exam notes for exam finding details. Patient tolerated the procedure well.    MEDICAL DECISION-MAKING: This patient is a 36yo F with PND most likely from acid reflux and rhinorrhea most likely from allergic rhinitis. However, will obtain a CT of her sinuses to be sure. If negative, will refer to GI and Allergy.

## 2023-01-24 ENCOUNTER — TRANSFERRED RECORDS (OUTPATIENT)
Dept: MULTI SPECIALTY CLINIC | Facility: CLINIC | Age: 38
End: 2023-01-24

## 2023-01-24 LAB — PAP SMEAR - HIM PATIENT REPORTED: NEGATIVE

## 2023-01-25 PROBLEM — L90.0 LICHEN SCLEROSUS: Status: ACTIVE | Noted: 2023-01-25

## 2023-02-06 ENCOUNTER — VIRTUAL VISIT (OUTPATIENT)
Dept: PEDIATRICS | Facility: CLINIC | Age: 38
End: 2023-02-06
Payer: COMMERCIAL

## 2023-02-06 DIAGNOSIS — L90.0 LICHEN SCLEROSUS: Primary | ICD-10-CM

## 2023-02-06 DIAGNOSIS — F33.1 MAJOR DEPRESSIVE DISORDER, RECURRENT EPISODE, MODERATE (H): ICD-10-CM

## 2023-02-06 PROCEDURE — 99214 OFFICE O/P EST MOD 30 MIN: CPT | Mod: 95 | Performed by: PEDIATRICS

## 2023-02-06 NOTE — PATIENT INSTRUCTIONS
Dear Eloina,    Please make a lab only visit.    You will be called to schedule your thyroid ultrasound. If you don't hear from someone in 1-2 days, please call radiology department at [phone] to schedule your test at [facility].      Please make a med check appt in May or June (ok for virtual)    Cortney Silveira MD  Internal Medicine/Pediatrics  [facility]

## 2023-02-06 NOTE — PROGRESS NOTES
"Eloina is a 37 year old who is being evaluated via a billable video visit.      How would you like to obtain your AVS? MyChart  If the video visit is dropped, the invitation should be resent by: Text to cell phone: [phone]  Will anyone else be joining your video visit? No        Assessment & Plan     Lichen sclerosus  Workup for autoimmune disease causing lichen sclerosus and thyroid  - Anti Nuclear Ada IgG by IFA with Reflex; Future  - Rheumatoid factor; Future  - IgA [LAB73]; Future  - Tissue transglutaminase ada IgA and IgG [IRN3380]; Future  - Vitamin B12; Future  - CBC with platelets; Future  - Hemoglobin A1c; Future  - TSH with free T4 reflex; Future  - F Actin EIA with reflex; Future  - Mitochondrial M2 Antibody IgG; Future  - US Thyroid; Future    Major depressive disorder, recurrent episode, moderate (H)  Controlled - will follow up at next visit    Body mass index (BMI) of 40.0-44.9 in adult (H)  Patient working on weight loss               BMI:   Estimated body mass index is 49.17 kg/m  as calculated from the following:    Height as of 7/15/22: 1.635 m (5' 4.37\").    Weight as of 7/15/22: 131.5 kg (289 lb 12.8 oz).       Patient Instructions   Dear Eloina,    Please make a lab only visit.    You will be called to schedule your thyroid ultrasound. If you don't hear from someone in 1-2 days, please call radiology department at [phone] to schedule your test at [facility].      Please make a med check appt in May or June (ok for virtual)    Cortney Silveira MD  Internal Medicine/Pediatrics  [facility]        Return in about 4 months (around 6/6/2023) for Follow up, with me, using a video visit.    Cortney Silveira MD  [facility]    Subjective   Eloina is a 37 year old, presenting for the following health issues:  No chief complaint on file.      History of Present Illness       Reason for visit:  Discuss autoimmune    She eats 0-1 servings of fruits and " vegetables daily.She consumes 1 sweetened beverage(s) daily.She exercises with enough effort to increase her heart rate 9 or less minutes per day.  She exercises with enough effort to increase her heart rate 3 or less days per week.   She is taking medications regularly.     Updates:    -lichen sclerosis (managed by gyn) - may need biopsy.  Patient being treated with clobetasol - patient in second week of this.  Has has follow up this week, then will decide biopsy or not.  They are wondering about an autoimmune disease causing this.      Patient used to have bad eczema - better after spleen problem fixed.     +FH - mom with RA, OA, thyroid issues    Interested in autoimmune testing and thyroid ultrasound.  She is hypothyroid and takes levothyroxine 75mcg. TSH in July 2022 was normal.    From up to date:  LS has been associated with autoimmune diseases such as alopecia areata, vitiligo, thyroid disorders, pernicious anemia, and diabetes mellitus. In addition, a few cases of concomitant LS and celiac disease have been published [25,26]. In one study of 350 women with histologically confirmed LS (of which 98 percent had vulvar disease), 22 percent had an autoimmune disorder and 42 percent had one or more autoantibodies (anti-thyroid, anti-gastric parietal cell, antinuclear, anti-smooth muscle, or antimitochondrial antibodies) with titers greater than 1:20 [27]. A separate study of 190 women with vulvar LS and 922 female controls also found an increased likelihood for autoimmune disorders among patients with vulvar LS (28 versus 9 percent) [24]. However, an increase in the prevalence of a similar panel of autoantibodies was not detected.                  Review of Systems         Objective           Vitals:  No vitals were obtained today due to virtual visit.    Physical Exam   GENERAL: Healthy, alert and no distress  EYES: Eyes grossly normal to inspection.  No discharge or erythema, or obvious scleral/conjunctival  abnormalities.  RESP: No audible wheeze, cough, or visible cyanosis.  No visible retractions or increased work of breathing.    SKIN: Visible skin clear. No significant rash, abnormal pigmentation or lesions.  NEURO: Cranial nerves grossly intact.  Mentation and speech appropriate for age.  PSYCH: Mentation appears normal, affect normal/bright, judgement and insight intact, normal speech and appearance well-groomed.                Video-Visit Details    Type of service:  Video Visit     Originating Location (pt. Location): Home  Distant Location (provider location):  Off-site  Platform used for Video Visit: [employer]

## 2023-02-08 ENCOUNTER — LAB (OUTPATIENT)
Dept: LAB | Facility: CLINIC | Age: 38
End: 2023-02-08
Payer: COMMERCIAL

## 2023-02-08 DIAGNOSIS — L90.0 LICHEN SCLEROSUS: ICD-10-CM

## 2023-02-08 LAB
ERYTHROCYTE [DISTWIDTH] IN BLOOD BY AUTOMATED COUNT: 13 % (ref 10–15)
HBA1C MFR BLD: 5.9 % (ref 0–5.6)
HCT VFR BLD AUTO: 39.7 % (ref 35–47)
HGB BLD-MCNC: 12.7 G/DL (ref 11.7–15.7)
MCH RBC QN AUTO: 28.7 PG (ref 26.5–33)
MCHC RBC AUTO-ENTMCNC: 32 G/DL (ref 31.5–36.5)
MCV RBC AUTO: 90 FL (ref 78–100)
PLATELET # BLD AUTO: 218 10E3/UL (ref 150–450)
RBC # BLD AUTO: 4.42 10E6/UL (ref 3.8–5.2)
TSH SERPL DL<=0.005 MIU/L-ACNC: 2.34 UIU/ML (ref 0.3–4.2)
VIT B12 SERPL-MCNC: 315 PG/ML (ref 232–1245)
WBC # BLD AUTO: 6.6 10E3/UL (ref 4–11)

## 2023-02-08 PROCEDURE — 86431 RHEUMATOID FACTOR QUANT: CPT

## 2023-02-08 PROCEDURE — 85027 COMPLETE CBC AUTOMATED: CPT

## 2023-02-08 PROCEDURE — 82784 ASSAY IGA/IGD/IGG/IGM EACH: CPT

## 2023-02-08 PROCEDURE — 86364 TISS TRNSGLTMNASE EA IG CLAS: CPT

## 2023-02-08 PROCEDURE — 86038 ANTINUCLEAR ANTIBODIES: CPT

## 2023-02-08 PROCEDURE — 99000 SPECIMEN HANDLING OFFICE-LAB: CPT

## 2023-02-08 PROCEDURE — 82607 VITAMIN B-12: CPT

## 2023-02-08 PROCEDURE — 83516 IMMUNOASSAY NONANTIBODY: CPT | Mod: 90

## 2023-02-08 PROCEDURE — 36415 COLL VENOUS BLD VENIPUNCTURE: CPT

## 2023-02-08 PROCEDURE — 86381 MITOCHONDRIAL ANTIBODY EACH: CPT

## 2023-02-08 PROCEDURE — 86039 ANTINUCLEAR ANTIBODIES (ANA): CPT

## 2023-02-08 PROCEDURE — 83036 HEMOGLOBIN GLYCOSYLATED A1C: CPT

## 2023-02-08 PROCEDURE — 84443 ASSAY THYROID STIM HORMONE: CPT

## 2023-02-09 LAB
ANA PAT SER IF-IMP: ABNORMAL
ANA SER QL IF: POSITIVE
ANA TITR SER IF: ABNORMAL {TITER}
IGA SERPL-MCNC: 26 MG/DL (ref 84–499)
RHEUMATOID FACT SER NEPH-ACNC: <7 IU/ML
SMA IGG SER-ACNC: 3 UNITS

## 2023-02-10 LAB
MITOCHONDRIA M2 IGG SER-ACNC: <1 U/ML
TTG IGA SER-ACNC: <0.2 U/ML
TTG IGG SER-ACNC: <0.6 U/ML

## 2023-02-15 ENCOUNTER — HOSPITAL ENCOUNTER (OUTPATIENT)
Dept: ULTRASOUND IMAGING | Facility: CLINIC | Age: 38
Discharge: HOME | End: 2023-02-15
Attending: PEDIATRICS | Admitting: PEDIATRICS
Payer: COMMERCIAL

## 2023-02-15 DIAGNOSIS — L90.0 LICHEN SCLEROSUS: ICD-10-CM

## 2023-02-15 PROCEDURE — 76536 US EXAM OF HEAD AND NECK: CPT

## 2023-02-16 DIAGNOSIS — K21.9 GASTROESOPHAGEAL REFLUX DISEASE WITHOUT ESOPHAGITIS: ICD-10-CM

## 2023-02-16 RX ORDER — OMEPRAZOLE 40 MG/1
40 CAPSULE, DELAYED RELEASE ORAL DAILY
Qty: 90 CAPSULE | Refills: 3 | Status: SHIPPED | OUTPATIENT
Start: 2023-02-16

## 2023-03-07 ENCOUNTER — LAB REQUISITION (OUTPATIENT)
Dept: LAB | Facility: CLINIC | Age: 38
End: 2023-03-07
Payer: COMMERCIAL

## 2023-03-07 DIAGNOSIS — N90.89 OTHER SPECIFIED NONINFLAMMATORY DISORDERS OF VULVA AND PERINEUM: ICD-10-CM

## 2023-03-07 PROCEDURE — 88312 SPECIAL STAINS GROUP 1: CPT | Mod: TC,ORL | Performed by: OBSTETRICS & GYNECOLOGY

## 2023-03-07 PROCEDURE — 88305 TISSUE EXAM BY PATHOLOGIST: CPT | Mod: TC,ORL | Performed by: OBSTETRICS & GYNECOLOGY

## 2023-03-07 PROCEDURE — 88305 TISSUE EXAM BY PATHOLOGIST: CPT | Mod: 26 | Performed by: PATHOLOGY

## 2023-03-22 LAB
PATH REPORT.COMMENTS IMP SPEC: NORMAL
PATH REPORT.COMMENTS IMP SPEC: NORMAL
PATH REPORT.FINAL DX SPEC: NORMAL
PATH REPORT.GROSS SPEC: NORMAL
PATH REPORT.MICROSCOPIC SPEC OTHER STN: NORMAL
PATH REPORT.RELEVANT HX SPEC: NORMAL
PHOTO IMAGE: NORMAL

## 2023-03-22 PROCEDURE — 88312 SPECIAL STAINS GROUP 1: CPT | Mod: 26 | Performed by: PATHOLOGY

## 2023-04-03 ENCOUNTER — MYC MEDICAL ADVICE (OUTPATIENT)
Dept: PEDIATRICS | Facility: CLINIC | Age: 38
End: 2023-04-03

## 2023-04-03 ENCOUNTER — OFFICE VISIT (OUTPATIENT)
Dept: PEDIATRICS | Facility: CLINIC | Age: 38
End: 2023-04-03
Payer: COMMERCIAL

## 2023-04-03 VITALS
WEIGHT: 289 LBS | HEART RATE: 86 BPM | BODY MASS INDEX: 49.04 KG/M2 | DIASTOLIC BLOOD PRESSURE: 60 MMHG | RESPIRATION RATE: 14 BRPM | SYSTOLIC BLOOD PRESSURE: 120 MMHG | TEMPERATURE: 97.3 F | OXYGEN SATURATION: 98 %

## 2023-04-03 DIAGNOSIS — J02.0 STREPTOCOCCAL SORE THROAT: Primary | ICD-10-CM

## 2023-04-03 LAB — DEPRECATED S PYO AG THROAT QL EIA: POSITIVE

## 2023-04-03 PROCEDURE — 99213 OFFICE O/P EST LOW 20 MIN: CPT | Performed by: PHYSICIAN ASSISTANT

## 2023-04-03 PROCEDURE — 87880 STREP A ASSAY W/OPTIC: CPT | Performed by: PHYSICIAN ASSISTANT

## 2023-04-03 RX ORDER — PENICILLIN V POTASSIUM 500 MG/1
500 TABLET, FILM COATED ORAL 2 TIMES DAILY
Qty: 20 TABLET | Refills: 0 | Status: SHIPPED | OUTPATIENT
Start: 2023-04-03 | End: 2023-11-03

## 2023-04-03 NOTE — TELEPHONE ENCOUNTER
"Called patient, sore throat for 8 days, thought it was post nasal drip. She has \"white\" stone and swollen. Scheduled for same day, today.    SARBJIT Weeks on 4/3/2023 at 9:33 AM    "

## 2023-05-01 ENCOUNTER — TRANSFERRED RECORDS (OUTPATIENT)
Dept: MULTI SPECIALTY CLINIC | Facility: CLINIC | Age: 38
End: 2023-05-01
Payer: COMMERCIAL

## 2023-05-09 ENCOUNTER — OFFICE VISIT (OUTPATIENT)
Dept: URGENT CARE | Facility: URGENT CARE | Age: 38
End: 2023-05-09
Payer: COMMERCIAL

## 2023-05-09 VITALS
HEART RATE: 80 BPM | RESPIRATION RATE: 20 BRPM | OXYGEN SATURATION: 98 % | SYSTOLIC BLOOD PRESSURE: 128 MMHG | DIASTOLIC BLOOD PRESSURE: 85 MMHG | TEMPERATURE: 98 F

## 2023-05-09 DIAGNOSIS — J01.90 ACUTE SINUSITIS WITH SYMPTOMS > 10 DAYS: Primary | ICD-10-CM

## 2023-05-09 PROCEDURE — 99213 OFFICE O/P EST LOW 20 MIN: CPT | Performed by: PHYSICIAN ASSISTANT

## 2023-05-09 NOTE — PROGRESS NOTES
SUBJECTIVE:  Eloina Aj is a 38 year old female here with concerns about sinus infection.  She states onset of symptoms were 1 day(s) ago.  She has had maxillary pressure. Course of illness is improving. Severity moderate  Current and Associated symptoms: nasal congestion, rhinorrhea, ear pain right, facial pain/pressure, headache, post-nasal drainage and swollen nodes.  Also some eye discharge.  Dizziness yesterday seems to have resolved.  No change in vision , severe, headache, weakness.   Predisposing factors include HX of recurrent sinusitis. Recent treatment has included: Antihistamine, Decongestants and OTC meds    Past Medical History:   Diagnosis Date     Anorexia     From age [age].     Asthma with exacerbation 2/14/2022     Chronic depressive personality disorder     hx of anxiety and depression     Depressive disorder 1997     Gastroesophageal reflux disease      PCOS (polycystic ovarian syndrome)      Thyroid disease      Tympanosclerosis, unspecified as to involvement      Social History     Tobacco Use     Smoking status: Never     Smokeless tobacco: Never   Vaping Use     Vaping status: Never Used   Substance Use Topics     Alcohol use: No       ROS:  Review of systems negative except as stated above.    OBJECTIVE:  /85   Pulse 80   Temp 98  F (36.7  C)   Resp 20   SpO2 98%   Exam:GENERAL APPEARANCE: healthy, alert and no distress  EYES: EOMI,  PERRL, conjunctiva clear  HENT: ear canals and TM's normal.  Nose and mouth without ulcers, erythema or lesions  NECK: supple, nontender, no lymphadenopathy  RESP: lungs clear to auscultation - no rales, rhonchi or wheezes  CV: regular rates and rhythm, normal S1 S2, no murmur noted  ABDOMEN:  soft, nontender, no HSM or masses and bowel sounds normal  NEURO: romberg negative, balance intact, Normal strength and tone, sensory exam grossly normal,  normal speech and mentation  SKIN: no suspicious lesions or rashes    ASSESSMENT:  (J01.90) Acute  sinusitis with symptoms > 10 days  (primary encounter diagnosis)  Comment: will home covid test  Plan: amoxicillin-clavulanate (AUGMENTIN) 875-125 MG         tablet            Red flags and emergent follow up discussed, and understood by patient  Follow up with PCP if symptoms worsen or fail to improve

## 2023-05-09 NOTE — PATIENT INSTRUCTIONS
1.  Plenty of fluids, rest, warm compresses on face  2.  Mucinex twice daily for at least 4 days  3.  Rosa Pot 1x in the morning 1x at night (SALINE MIST SPRAY IS AN ACCEPTABLE, THOUGH NOT AS EFFECTIVE REPLACEMENT)  4.  Benadryl (diphenhydramine) at bedtime   5.  Either Claritin (Loratadine), Allegra (Fexofenadine), or Zyrtec (Cetirizine) in the day  6.  Flonase (Fluticasone) 2x each nostril twice a day for two weeks, then once each nostril once a day  7. Antibiotic twice daily for 7 Days  8. Probiotic (ie Culturelle) 1-2 hours after each antibiotic dose     Please let us know if symptoms persist, or worsen.      Patient Education     Self-Care for Sinusitis     Drinking plenty of water can help sinuses drain.   Sinusitis can often be managed with self-care. Self-care can keep sinuses moist and make you feel more comfortable. Remember to follow your doctor's instructions closely. This can make a big difference in getting your sinus problem under control.  Drink fluids  Drinking extra fluids helps thin your mucus. This lets it drain from your sinuses more easily. Have a glass of water every hour or two. A humidifier helps in much the same way. Fluids can also offset the drying effects of certain medicines. If you use a humidifier, follow the product maker's instructions on how to use it. Clean it on a regular schedule.  Use saltwater rinses  Rinses help keep your sinuses and nose moist. Mix a teaspoon of salt in 8 ounces of fresh, warm water. Use a bulb syringe to gently squirt the water into your nose a few times a day. You can also buy ready-made saline nasal sprays.  Apply hot or cold packs  Applying heat to the area surrounding your sinuses may make you feel more comfortable. Use a hot water bottle or a hand towel dipped in hot water. Some people also find ice packs effective for relieving pain.  Medicines  Your doctor may prescribe medications to help treat your sinusitis. If you have an infection,  antibiotics can help clear it up. If you are prescribed antibiotics, take all pills on schedule until they are gone, even if you feel better. Decongestants help relieve swelling. Use decongestant sprays for short periods only under the direction of your doctor. If you have allergies, your doctor may prescribe medications to help relieve them.   Date Last Reviewed: 10/1/2016    7444-4947 [employer]. [address] [place] [address], [place] [address]. All rights reserved. This information is not intended as a substitute for professional medical care. Always follow your healthcare professional's instructions.

## 2023-05-15 ENCOUNTER — VIRTUAL VISIT (OUTPATIENT)
Dept: PEDIATRICS | Facility: CLINIC | Age: 38
End: 2023-05-15
Payer: COMMERCIAL

## 2023-05-15 DIAGNOSIS — F41.9 ANXIETY: ICD-10-CM

## 2023-05-15 DIAGNOSIS — F33.1 MAJOR DEPRESSIVE DISORDER, RECURRENT EPISODE, MODERATE (H): ICD-10-CM

## 2023-05-15 PROCEDURE — 99213 OFFICE O/P EST LOW 20 MIN: CPT | Mod: VID | Performed by: PEDIATRICS

## 2023-05-15 RX ORDER — BUPROPION HYDROCHLORIDE 200 MG/1
200 TABLET, EXTENDED RELEASE ORAL 2 TIMES DAILY
Qty: 60 TABLET | Refills: 5 | Status: SHIPPED | OUTPATIENT
Start: 2023-05-15 | End: 2023-11-03

## 2023-05-15 RX ORDER — ESCITALOPRAM OXALATE 20 MG/1
TABLET ORAL
Qty: 45 TABLET | Refills: 6 | Status: SHIPPED | OUTPATIENT
Start: 2023-05-15 | End: 2023-11-03

## 2023-05-15 NOTE — PATIENT INSTRUCTIONS
Dear Eloina,    Please continue your current medications.    We will follow up in 6 months for an inperson physical.    Cortney Silveira MD  Internal Medicine/Pediatrics  [facility]

## 2023-05-15 NOTE — PROGRESS NOTES
"Eloina is a 38 year old who is being evaluated via a billable video visit.      How would you like to obtain your AVS? MyChart  If the video visit is dropped, the invitation should be resent by: Text to cell phone: [phone]  Will anyone else be joining your video visit? No        Assessment & Plan     Major depressive disorder, recurrent episode, moderate (H)  Controlled, refill  - buPROPion (WELLBUTRIN SR) 200 MG 12 hr tablet; Take 1 tablet (200 mg) by mouth 2 times daily  - escitalopram (LEXAPRO) 20 MG tablet; TAKE 1 AND 1/2 TABLETS BY MOUTH DAILY    Anxiety  Controlled, refilll  - escitalopram (LEXAPRO) 20 MG tablet; TAKE 1 AND 1/2 TABLETS BY MOUTH DAILY             BMI:   Estimated body mass index is 49.04 kg/m  as calculated from the following:    Height as of 7/15/22: 1.635 m (5' 4.37\").    Weight as of 4/3/23: 131.1 kg (289 lb).       Patient Instructions   Dear Eloina,    Please continue your current medications.    We will follow up in 6 months for an inperson physical.    Cortney Silveira MD  Internal Medicine/Pediatrics  [facility]        Cortney Silveira MD  [facility]    Subjective   Eloina is a 38 year old, presenting for the following health issues:  No chief complaint on file.         View : No data to display.              HPI     Last appt 2/6/23 for lichen sclerosis    Visit for depression today - wellbutrin 200 BID, lexapro 30mg    She wants to take dog for a walk, but then her mom fell in March - she is taking of mom now. This past week woke up with right side of face swollen - bacterial sinusitis    Yearly prevent in July 2022    Currently things are ok - no new side effects    Mammogram - done at [facility] - 5/1/23    Mammogram done on this date: 5/1/23 (approximately), by this group: [facility], results were normal.             Review of Systems         Objective           Vitals:  No vitals were obtained today due to virtual visit.    Physical " Exam   GENERAL: Healthy, alert and no distress  EYES: Eyes grossly normal to inspection.  No discharge or erythema, or obvious scleral/conjunctival abnormalities.  RESP: No audible wheeze, cough, or visible cyanosis.  No visible retractions or increased work of breathing.    SKIN: Visible skin clear. No significant rash, abnormal pigmentation or lesions.  NEURO: Cranial nerves grossly intact.  Mentation and speech appropriate for age.  PSYCH: Mentation appears normal, affect normal/bright, judgement and insight intact, normal speech and appearance well-groomed.                Video-Visit Details    Type of service:  Video Visit     Originating Location (pt. Location): Home  Distant Location (provider location):  Off-site  Platform used for Video Visit: Senior Moments

## 2023-05-16 ENCOUNTER — OFFICE VISIT (OUTPATIENT)
Dept: DERMATOLOGY | Facility: CLINIC | Age: 38
End: 2023-05-16
Attending: PEDIATRICS
Payer: COMMERCIAL

## 2023-05-16 DIAGNOSIS — L73.2 HIDRADENITIS SUPPURATIVA: Primary | ICD-10-CM

## 2023-05-16 DIAGNOSIS — L82.1 SEBORRHEIC KERATOSIS: ICD-10-CM

## 2023-05-16 DIAGNOSIS — D22.9 MULTIPLE NEVI: ICD-10-CM

## 2023-05-16 DIAGNOSIS — D18.01 ANGIOMA OF SKIN: ICD-10-CM

## 2023-05-16 DIAGNOSIS — L21.9 DERMATITIS, SEBORRHEIC: ICD-10-CM

## 2023-05-16 DIAGNOSIS — L81.4 LENTIGO: ICD-10-CM

## 2023-05-16 PROCEDURE — 99204 OFFICE O/P NEW MOD 45 MIN: CPT | Performed by: PHYSICIAN ASSISTANT

## 2023-05-16 RX ORDER — FLUOCINONIDE TOPICAL SOLUTION USP, 0.05% 0.5 MG/ML
SOLUTION TOPICAL
Qty: 50 ML | Refills: 3 | Status: SHIPPED | OUTPATIENT
Start: 2023-05-16 | End: 2024-05-28

## 2023-05-16 RX ORDER — CLINDAMYCIN PHOSPHATE 10 MG/G
GEL TOPICAL
Qty: 60 G | Refills: 11 | Status: SHIPPED | OUTPATIENT
Start: 2023-05-16 | End: 2024-05-28

## 2023-05-16 NOTE — Clinical Note
5/16/2023         RE: Eloina Aj  [address] [id number]        Dear Colleague,    Thank you for referring your patient, Eloina Aj, to the [facility]. Please see a copy of my visit note below.    HPI:   Chief complaints: Eloina Aj is a pleasant 38 year old female who presents for Full skin cancer screening to rule out skin cancer   Last Skin Exam: about 15 years ago      1st Baseline: no  Personal HX of Skin Cancer: no   Personal HX of Malignant Melanoma: no   Family HX of Skin Cancer / Malignant Melanoma: no  Personal HX of Atypical Moles:   no  Risk factors: history of sun exposure and burns; tanning bed use in the apstd  New / Changing lesions:none  Social History: has 2 children 5th grade and prek. Driving to [place] once school is out to see in-laws  On review of systems, there are no further skin complaints, patient is feeling otherwise well.   ROS of the following were done and are negative: Constitutional, Eyes, Ears, Nose,   Mouth, Throat, Cardiovascular, Respiratory, GI, Genitourinary, Musculoskeletal,   Psychiatric, Endocrine, Allergic/Immunologic.    PHYSICAL EXAM:   There were no vitals taken for this visit.  Skin exam performed as follows: Type 2 skin. Mood appropriate  Alert and Oriented X 3. Well developed, well nourished in no distress.  General appearance: Normal  Head including face: Normal  Eyes: conjunctiva and lids: Normal  Mouth: Lips, teeth, gums: Normal  Neck: Normal  Chest-breast/axillae: Normal  Back: Normal  Extremities: digits/nails (clubbing): Normal  Eccrine and Apocrine glands: Normal  Right upper extremity: Normal  Left upper extremity: Normal  Right lower extremity: Normal  Left lower extremity: Normal  Skin: Scalp and body hair: See below    Pt deferred exam of breasts, groin, buttocks: No    Other physical findings:  1. Multiple pigmented macules on extremities and trunk  2. Multiple pigmented macules on face,  trunk and extremities  3. Multiple vascular papules on trunk, arms and legs  4. Multiple scattered keratotic plaques  5. Flaking on the scalp  6. Inflammatory papules beneath breasts       Except as noted above, no other signs of skin cancer or melanoma.     ASSESSMENT/PLAN:   Benign Full skin cancer screening today. . Patient with history of none  Advised on monthly self exams and 1 year  Patient Education: Appropriate brochures given.    1. Multiple benign appearing melanocytic nevi on arms, legs and trunk. Discussed ABCDEs of melanoma and sunscreen. Makes > 100 nevi - advised on need for a yearly FSE  2. Multiple lentigos on arms, legs and trunk. Advised benign, no treatment needed.  3. Multiple scattered angiomas. Advised benign, no treatment needed.   4. Seborrheic dermatitis vs psoriasis - doing well on lidex PRN. Refill provided  5. Hidradenitis suppurativa - advised on diagnosis and treatment options. Discussed inflammatory condition of unknown etiology.  --Start OTC BPO wash or Hibiclens QD in the shower  --Start clindamycin gel every day  --Can call for abx if flaring            Follow-up: ***    1.) Patient was asked about new and changing moles. YES  2.) Patient received a complete physical skin examination: YES  3.) Patient was counseled to perform a monthly self skin examination: YES  Scribed By: Candi Perkins, MS, PAKIET          Again, thank you for allowing me to participate in the care of your patient.        Sincerely,        Candi Perkins PA-C

## 2023-05-16 NOTE — PROGRESS NOTES
HPI:   Chief complaints: Eloina Aj is a pleasant 38 year old female who presents for Full skin cancer screening to rule out skin cancer   Last Skin Exam: about 15 years ago      1st Baseline: no  Personal HX of Skin Cancer: no   Personal HX of Malignant Melanoma: no   Family HX of Skin Cancer / Malignant Melanoma: no  Personal HX of Atypical Moles:   no  Risk factors: history of sun exposure and burns; tanning bed use in the apstd  New / Changing lesions:none  Social History: has 2 children 5th grade and prek. Driving to [place] once school is out to see in-laws  On review of systems, there are no further skin complaints, patient is feeling otherwise well.   ROS of the following were done and are negative: Constitutional, Eyes, Ears, Nose,   Mouth, Throat, Cardiovascular, Respiratory, GI, Genitourinary, Musculoskeletal,   Psychiatric, Endocrine, Allergic/Immunologic.    PHYSICAL EXAM:   There were no vitals taken for this visit.  Skin exam performed as follows: Type 2 skin. Mood appropriate  Alert and Oriented X 3. Well developed, well nourished in no distress.  General appearance: Normal  Head including face: Normal  Eyes: conjunctiva and lids: Normal  Mouth: Lips, teeth, gums: Normal  Neck: Normal  Chest-breast/axillae: Normal  Back: Normal  Extremities: digits/nails (clubbing): Normal  Eccrine and Apocrine glands: Normal  Right upper extremity: Normal  Left upper extremity: Normal  Right lower extremity: Normal  Left lower extremity: Normal  Skin: Scalp and body hair: See below    Pt deferred exam of breasts, groin, buttocks: No    Other physical findings:  1. Multiple pigmented macules on extremities and trunk  2. Multiple pigmented macules on face, trunk and extremities  3. Multiple vascular papules on trunk, arms and legs  4. Multiple scattered keratotic plaques  5. Flaking on the scalp  6. Inflammatory papules beneath breasts       Except as noted above, no other signs of skin cancer or melanoma.      ASSESSMENT/PLAN:   Benign Full skin cancer screening today. . Patient with history of none  Advised on monthly self exams and 1 year  Patient Education: Appropriate brochures given.    1. Multiple benign appearing melanocytic nevi on arms, legs and trunk. Discussed ABCDEs of melanoma and sunscreen. Makes > 100 nevi - advised on need for a yearly FSE  2. Multiple lentigos on arms, legs and trunk. Advised benign, no treatment needed.  3. Multiple scattered angiomas. Advised benign, no treatment needed.   4. Seborrheic dermatitis vs psoriasis - doing well on lidex PRN. Refill provided  5. Hidradenitis suppurativa - advised on diagnosis and treatment options. Discussed inflammatory condition of unknown etiology.  --Start OTC BPO wash or Hibiclens QD in the shower  --Start clindamycin gel every day  --Can call for abx if flaring            Follow-up: yearly/PRN sooner    1.) Patient was asked about new and changing moles. YES  2.) Patient received a complete physical skin examination: YES  3.) Patient was counseled to perform a monthly self skin examination: YES  Scribed By: Candi Perkins, MS, PA-C

## 2023-05-16 NOTE — PATIENT INSTRUCTIONS
For beneath your breasts, this is mild hidradenitis suppurativa    Start washing with OTC Hibiclens or a benzoyl peroxide wash (Oxy, Panoxyl....)  After the shower apply clindamycin gel daily     You can call me if you are flaring and we do oral antibiotics for 1-2 months

## 2023-05-27 ENCOUNTER — HEALTH MAINTENANCE LETTER (OUTPATIENT)
Age: 38
End: 2023-05-27

## 2023-07-17 DIAGNOSIS — E03.9 HYPOTHYROIDISM, UNSPECIFIED TYPE: ICD-10-CM

## 2023-07-17 DIAGNOSIS — E28.2 PCOS (POLYCYSTIC OVARIAN SYNDROME): ICD-10-CM

## 2023-07-20 RX ORDER — LEVOTHYROXINE SODIUM 75 UG/1
TABLET ORAL
Qty: 30 TABLET | Refills: 4 | Status: SHIPPED | OUTPATIENT
Start: 2023-07-20 | End: 2023-11-03

## 2023-08-14 ENCOUNTER — NURSE TRIAGE (OUTPATIENT)
Dept: PEDIATRICS | Facility: CLINIC | Age: 38
End: 2023-08-14
Payer: COMMERCIAL

## 2023-08-14 NOTE — TELEPHONE ENCOUNTER
Nurse Triage SBAR    Is this a 2nd Level Triage? NO    Situation: Pt calling to report diarrhea.     Background: Pt reports intermittent diarrhea for the past few years. Pt reports that over the past month or so this has gotten worse. Pt reports increasing vegetables and fiber since symptoms started, and also avoiding fatty/greasy foods. Pt reports drinking a minimum of 0.5 gal water daily. Pt reports that tomatoes, bagels and bananas make her have a BM immediately. Pt reports intermittent dizziness and lightheadedness but reports this has been ongoing before the diarrhea.     Assessment: Pt reports loose to watery stools 4-6 times daily for the past 4 weeks. Pt endorses occasional urgency, increased flatulence and bloating at night. Pt denies abdominal pain, rectal pain, and fever. Pt reports stools are dark brown, sometimes a light orange color. Pt denies bloody or black stool. Last orange stool 3 days ago, pt reports orange stool usually a few times a week. Pt denies: N/V, symptoms of dehydration (dry mouth, decreased urine output, excessive thirst), weight loss, recent international travel, antibiotic use, and pregnancy.      Protocol Recommended Disposition:   See in Office Today    Recommendation: Recommended pt be seen today per protocol. No appointments available. Recommended pt instead be seen at Urgent Care. Pt verbalized understanding and agrees with plan. Pt denies further questions at this time. Encouraged pt to call back if symptoms worsen or if new symptoms arise.     Reason for Disposition   MODERATE diarrhea (e.g., 4-6 times / day more than normal) and present > 48 hours (2 days)    Additional Information   Negative: Shock suspected (e.g., cold/pale/clammy skin, too weak to stand, low BP, rapid pulse)   Negative: Difficult to awaken or acting confused (e.g., disoriented, slurred speech)   Negative: Sounds like a life-threatening emergency to the triager   Negative: SEVERE abdominal pain (e.g.,  excruciating) and present > 1 hour   Negative: SEVERE abdominal pain and age > 60 years   Negative: Bloody, black, or tarry bowel movements (Exception: chronic-unchanged black-grey bowel movements and is taking iron pills or Pepto-Bismol)   Negative: SEVERE diarrhea (e.g., 7 or more times / day more than normal) and age > 60 years   Negative: Constant abdominal pain lasting > 2 hours   Negative: Drinking very little and has signs of dehydration (e.g., no urine > 12 hours, very dry mouth, very lightheaded)   Negative: Patient sounds very sick or weak to the triager    Protocols used: Diarrhea-A-OH    Hunter Griffith RN on 8/14/2023 at 3:42 PM

## 2023-08-21 NOTE — LETTER
Linda 3, 2021    RE: Eloina Aj, : 1985      Dear Dr. Silveira,     I had the pleasure of seeing Eloina today in follow-up for her laparoscopic hand assisted excision and marsupialization of a large splenic cyst on 21. The patient tolerated the procedure well. We reviewed the pathology again today which showed a epithelial cyst associated with inflammation (from prior attempts at sclerosing). She had her surgical drain removed on  and states that there is occasional serous drainage from the site which she keeps dressed with a Band-Aid.     On exam, the patient's incisions are healing well without signs of infection.     Eloina is doing very well postoperatively. We will be happy to see her in the future as needed. She was encouraged to call us with any questions or concerns.      Sincerely,            Lewis Wilkes MD   verbal instruction/written material/individual instruction/patient instructed

## 2023-08-28 DIAGNOSIS — E28.2 PCOS (POLYCYSTIC OVARIAN SYNDROME): ICD-10-CM

## 2023-09-01 ENCOUNTER — TRANSFERRED RECORDS (OUTPATIENT)
Dept: HEALTH INFORMATION MANAGEMENT | Facility: CLINIC | Age: 38
End: 2023-09-01
Payer: COMMERCIAL

## 2023-09-06 ENCOUNTER — MEDICAL CORRESPONDENCE (OUTPATIENT)
Dept: HEALTH INFORMATION MANAGEMENT | Facility: CLINIC | Age: 38
End: 2023-09-06

## 2023-10-29 ENCOUNTER — HEALTH MAINTENANCE LETTER (OUTPATIENT)
Age: 38
End: 2023-10-29

## 2023-11-02 ASSESSMENT — ENCOUNTER SYMPTOMS
PARESTHESIAS: 0
HEARTBURN: 1
NAUSEA: 0
DYSURIA: 0
SHORTNESS OF BREATH: 0
CONSTIPATION: 0
SORE THROAT: 0
NERVOUS/ANXIOUS: 0
ARTHRALGIAS: 1
DIARRHEA: 1
COUGH: 0
WEAKNESS: 0
PALPITATIONS: 0
ABDOMINAL PAIN: 0
EYE PAIN: 0
CHILLS: 0
HEADACHES: 0
JOINT SWELLING: 0
DIZZINESS: 1
BREAST MASS: 0
FEVER: 0
HEMATOCHEZIA: 0
FREQUENCY: 0
HEMATURIA: 0
MYALGIAS: 0

## 2023-11-02 ASSESSMENT — PATIENT HEALTH QUESTIONNAIRE - PHQ9
SUM OF ALL RESPONSES TO PHQ QUESTIONS 1-9: 10
SUM OF ALL RESPONSES TO PHQ QUESTIONS 1-9: 10
10. IF YOU CHECKED OFF ANY PROBLEMS, HOW DIFFICULT HAVE THESE PROBLEMS MADE IT FOR YOU TO DO YOUR WORK, TAKE CARE OF THINGS AT HOME, OR GET ALONG WITH OTHER PEOPLE: NOT DIFFICULT AT ALL

## 2023-11-03 ENCOUNTER — OFFICE VISIT (OUTPATIENT)
Dept: PEDIATRICS | Facility: CLINIC | Age: 38
End: 2023-11-03
Payer: COMMERCIAL

## 2023-11-03 VITALS
HEART RATE: 66 BPM | HEIGHT: 66 IN | BODY MASS INDEX: 47.09 KG/M2 | WEIGHT: 293 LBS | OXYGEN SATURATION: 96 % | TEMPERATURE: 98.1 F | DIASTOLIC BLOOD PRESSURE: 80 MMHG | SYSTOLIC BLOOD PRESSURE: 117 MMHG | RESPIRATION RATE: 16 BRPM

## 2023-11-03 DIAGNOSIS — R73.03 PREDIABETES: ICD-10-CM

## 2023-11-03 DIAGNOSIS — Z13.6 SCREENING FOR CARDIOVASCULAR CONDITION: ICD-10-CM

## 2023-11-03 DIAGNOSIS — F33.1 MAJOR DEPRESSIVE DISORDER, RECURRENT EPISODE, MODERATE (H): ICD-10-CM

## 2023-11-03 DIAGNOSIS — Z00.01 ENCOUNTER FOR GENERAL ADULT MEDICAL EXAMINATION WITH ABNORMAL FINDINGS: Primary | ICD-10-CM

## 2023-11-03 DIAGNOSIS — E03.9 HYPOTHYROIDISM, UNSPECIFIED TYPE: ICD-10-CM

## 2023-11-03 DIAGNOSIS — E28.2 PCOS (POLYCYSTIC OVARIAN SYNDROME): ICD-10-CM

## 2023-11-03 DIAGNOSIS — F41.9 ANXIETY: ICD-10-CM

## 2023-11-03 LAB
CHOLEST SERPL-MCNC: 206 MG/DL
HBA1C MFR BLD: 5.7 % (ref 0–5.6)
HDLC SERPL-MCNC: 67 MG/DL
LDLC SERPL CALC-MCNC: 118 MG/DL
NONHDLC SERPL-MCNC: 139 MG/DL
TRIGL SERPL-MCNC: 105 MG/DL

## 2023-11-03 PROCEDURE — 99214 OFFICE O/P EST MOD 30 MIN: CPT | Mod: 25 | Performed by: PEDIATRICS

## 2023-11-03 PROCEDURE — 80061 LIPID PANEL: CPT | Performed by: PEDIATRICS

## 2023-11-03 PROCEDURE — 99396 PREV VISIT EST AGE 40-64: CPT | Performed by: PEDIATRICS

## 2023-11-03 PROCEDURE — 36415 COLL VENOUS BLD VENIPUNCTURE: CPT | Performed by: PEDIATRICS

## 2023-11-03 PROCEDURE — 83036 HEMOGLOBIN GLYCOSYLATED A1C: CPT | Performed by: PEDIATRICS

## 2023-11-03 RX ORDER — CLOBETASOL PROPIONATE 0.5 MG/G
OINTMENT TOPICAL
COMMUNITY
Start: 2023-01-24

## 2023-11-03 RX ORDER — BUPROPION HYDROCHLORIDE 200 MG/1
200 TABLET, EXTENDED RELEASE ORAL 2 TIMES DAILY
Qty: 60 TABLET | Refills: 5 | Status: SHIPPED | OUTPATIENT
Start: 2023-11-03 | End: 2024-09-25

## 2023-11-03 RX ORDER — ESCITALOPRAM OXALATE 20 MG/1
TABLET ORAL
Qty: 45 TABLET | Refills: 6 | Status: SHIPPED | OUTPATIENT
Start: 2023-11-03

## 2023-11-03 RX ORDER — LEVOTHYROXINE SODIUM 75 UG/1
75 TABLET ORAL DAILY
Qty: 30 TABLET | Refills: 11 | Status: SHIPPED | OUTPATIENT
Start: 2023-11-03 | End: 2024-09-25

## 2023-11-03 RX ORDER — NORETHINDRONE ACETATE AND ETHINYL ESTRADIOL 1MG-20(21)
KIT ORAL
COMMUNITY
Start: 2023-02-05

## 2023-11-03 ASSESSMENT — ENCOUNTER SYMPTOMS
HEMATOCHEZIA: 0
ABDOMINAL PAIN: 0
HEARTBURN: 1
WEAKNESS: 0
PARESTHESIAS: 0
HEMATURIA: 0
DIZZINESS: 1
DYSURIA: 0
FEVER: 0
CHILLS: 0
ARTHRALGIAS: 1
MYALGIAS: 0
CONSTIPATION: 0
EYE PAIN: 0
JOINT SWELLING: 0
FREQUENCY: 0
COUGH: 0
BREAST MASS: 0
PALPITATIONS: 0
NAUSEA: 0
DIARRHEA: 1
SHORTNESS OF BREATH: 0
HEADACHES: 0
NERVOUS/ANXIOUS: 0
SORE THROAT: 0

## 2023-11-03 ASSESSMENT — PAIN SCALES - GENERAL: PAINLEVEL: NO PAIN (0)

## 2023-11-03 NOTE — PROGRESS NOTES
SUBJECTIVE:   CC: Eloina is an 38 year old who presents for preventive health visit.       11/3/2023     9:47 AM   Additional Questions   Roomed by JOLEEN Lu   Accompanied by n/a         11/3/2023     9:47 AM   Patient Reported Additional Medications   Patient reports taking the following new medications n/a       Healthy Habits:     Getting at least 3 servings of Calcium per day:  NO    Bi-annual eye exam:  Yes    Dental care twice a year:  Yes    Sleep apnea or symptoms of sleep apnea:  Daytime drowsiness and Excessive snoring    Diet:  Regular (no restrictions)    Frequency of exercise:  1 day/week    Duration of exercise:  15-30 minutes    Taking medications regularly:  Yes    Medication side effects:  None    Additional concerns today:  Yes    Diarrhea and heartburn are very bad - up to 6 times per day. Upcoming upper and lower GI.  Even positional gerd on PPI. Following with [facility]. Planning to biopsy small intestine. They are thinking it might be metformin.     Anxiety seems better than last year.     Today's PHQ-9 Score:       2023    10:24 PM   PHQ-9 SCORE   PHQ-9 Total Score MyChart 10 (Moderate depression)   PHQ-9 Total Score 10         Social History     Tobacco Use    Smoking status: Never    Smokeless tobacco: Never   Substance Use Topics    Alcohol use: No             2023    10:27 PM   Alcohol Use   Prescreen: >3 drinks/day or >7 drinks/week? No     Reviewed orders with patient.  Reviewed health maintenance and updated orders accordingly - Yes      Breast Cancer Screenin/14/2022     9:38 PM   Breast CA Risk Assessment (FHS-7)   Do you have a family history of breast, colon, or ovarian cancer? No / Unknown         Mammogram Screening - Offered annual screening and updated Health Maintenance for mutual plan based on risk factor consideration  Pertinent mammograms are reviewed under the imaging tab.    History of abnormal Pap smear: NO - age 30-65 PAP every 5 years with  "negative HPV co-testing recommended     Reviewed and updated as needed this visit by clinical staff   Tobacco  Allergies  Meds              Reviewed and updated as needed this visit by Provider                     Review of Systems   Constitutional:  Negative for chills and fever.   HENT:  Negative for congestion, ear pain, hearing loss and sore throat.    Eyes:  Negative for pain and visual disturbance.   Respiratory:  Negative for cough and shortness of breath.    Cardiovascular:  Negative for chest pain, palpitations and peripheral edema.   Gastrointestinal:  Positive for diarrhea and heartburn. Negative for abdominal pain, constipation, hematochezia and nausea.   Breasts:  Negative for tenderness, breast mass and discharge.   Genitourinary:  Negative for dysuria, frequency, genital sores, hematuria, pelvic pain, urgency, vaginal bleeding and vaginal discharge.   Musculoskeletal:  Positive for arthralgias. Negative for joint swelling and myalgias.   Skin:  Negative for rash.   Neurological:  Positive for dizziness. Negative for weakness, headaches and paresthesias.   Psychiatric/Behavioral:  Negative for mood changes. The patient is not nervous/anxious.           OBJECTIVE:   /80 (BP Location: Right arm, Patient Position: Sitting, Cuff Size: Adult Large)   Pulse 66   Temp 98.1  F (36.7  C) (Oral)   Resp 16   Ht 1.671 m (5' 5.79\")   Wt 135.6 kg (299 lb)   LMP 10/03/2023   SpO2 96%   Breastfeeding No   BMI 48.57 kg/m    Physical Exam  GENERAL: healthy, alert and no distress  EYES: Eyes grossly normal to inspection, PERRL and conjunctivae and sclerae normal  HENT: ear canals normal,  left TM with chronic hole nose and mouth without ulcers or lesions  NECK: no adenopathy, no asymmetry, masses, or scars and thyroid normal to palpation  RESP: lungs clear to auscultation - no rales, rhonchi or wheezes  CV: regular rate and rhythm, normal S1 S2, no S3 or S4, no murmur, click or rub, no peripheral edema " and peripheral pulses strong  ABDOMEN: soft, nontender, no hepatosplenomegaly, no masses and bowel sounds normal  MS: no gross musculoskeletal defects noted, no edema  SKIN: no suspicious lesions or rashes  NEURO: Normal strength and tone, mentation intact and speech normal  PSYCH: mentation appears normal, affect normal/bright    Diagnostic Test Results:  Labs reviewed in Epic    ASSESSMENT/PLAN:   (Z00.01) Encounter for general adult medical examination with abnormal findings  (primary encounter diagnosis)  Comment: Pap smear done on this date: 1/24/23 (approximately), by this group: [facility], results were normal.. Abstracted  Plan:     (R73.03) Prediabetes  Comment: currently on metformin for PCOS and prediabetes.   Plan: HEMOGLOBIN A1C            (F33.1) Major depressive disorder, recurrent episode, moderate (H)  Comment: stable, follow up in 6m  Plan: buPROPion (WELLBUTRIN SR) 200 MG 12 hr tablet,         escitalopram (LEXAPRO) 20 MG tablet            (F41.9) Anxiety  Comment: stable  Plan: escitalopram (LEXAPRO) 20 MG tablet        Follow up in 6m    (E03.9) Hypothyroidism, unspecified type  Comment: stable, labs from this summer ok  Plan: levothyroxine (SYNTHROID/LEVOTHROID) 75 MCG         tablet            (E28.2) PCOS (polycystic ovarian syndrome)  Comment:   Plan: metFORMIN (GLUCOPHAGE) 500 MG tablet            (Z13.6) Screening for cardiovascular condition  Comment:   Plan: Lipid Profile (Chol, Trig, HDL, LDL calc)            Patient has been advised of split billing requirements and indicates understanding: Yes      COUNSELING:  Reviewed preventive health counseling, as reflected in patient instructions        She reports that she has never smoked. She has never used smokeless tobacco.          Cortney Silveira MD  [facility]  Answers submitted by the patient for this visit:  Patient Health Questionnaire (Submitted on 11/2/2023)  If you checked off any problems, how difficult  have these problems made it for you to do your work, take care of things at home, or get along with other people?: Not difficult at all  PHQ9 TOTAL SCORE: 10

## 2023-11-07 NOTE — PROGRESS NOTES
"Eloina Aj is a 34 year old female who is being evaluated via a billable video visit.      The patient has been notified of following:     \"This video visit will be conducted via a call between you and your physician/provider. We have found that certain health care needs can be provided without the need for an in-person physical exam.  This service lets us provide the care you need with a video conversation.  If a prescription is necessary we can send it directly to your pharmacy.  If lab work is needed we can place an order for that and you can then stop by our lab to have the test done at a later time.    Video visits are billed at different rates depending on your insurance coverage.  Please reach out to your insurance provider with any questions.     If during the course of the call the physician/provider feels a video visit is not appropriate, you will not be charged for this service.\"    Patient has given verbal consent for Video visit? Yes    How would you like to obtain your AVS? Scarlet    Patient would like the video invitation sent by: Text to cell phone: [phone]    Subjective     Eloina Aj is a 34 year old female who presents to clinic today for the following health issues:    She is also here to establish care - previous Dr. Calderon patient.    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   essential worker right now - repairs [employer] - everyone healthy.    Depression and Anxiety Follow-Up    How are you doing with your depression since your last visit? No change     How are you doing with your anxiety since your last visit?  Worsened, worrying about little things     Are you having other symptoms that might be associated with depression or anxiety? No    Have you had a significant life event? No     Do you have any concerns with your use of alcohol or other drugs? No    On Lexapro (previously tried Celexa and Wellbutrin).  Started Lexapro after pregnancy, but also not " "sleeping due to her 1 y/o not sleeping. No therapy right now.    Social History     Tobacco Use     Smoking status: Never Smoker     Smokeless tobacco: Never Used   Substance Use Topics     Alcohol use: No     Drug use: No     PHQ 12/6/2018 8/14/2019 4/17/2020   PHQ-9 Total Score 5 8 9   Q9: Thoughts of better off dead/self-harm past 2 weeks Not at all Not at all Not at all     SHARITA-7 SCORE 10/25/2018 12/6/2018 4/17/2020   Total Score 3 5 10           Suicide Assessment Five-step Evaluation and Treatment (SAFE-T)           Video Start Time: 8:15 AM    PROBLEMS TO ADD ON...    --Hypothyroid - denies any symptoms of hyper/hypo - states her does has been stable for years.  Last labs in 3/19 normal.    --PCOS - on metforming 500mg BID (had GI issues at higher dosing)    --Fibromyalgia - recent diagnosis - following with Rheumatology for this.  Occ uses Tylenol which helps, does not like to take a lot of medications.  This was brought about by an abnormal biopsy of her cervix by her OB/GYN (DR. Nicolas at [facility]) - found inflammation and then patient found to have +PAOLA          Reviewed and updated as needed this visit by Provider  Meds         Review of Systems   ROS COMP: Constitutional, HEENT, cardiovascular, pulmonary, gi and gu systems are negative, except as otherwise noted.      Objective    There were no vitals taken for this visit.  Estimated body mass index is 44.28 kg/m  as calculated from the following:    Height as of 5/13/19: 1.638 m (5' 4.5\").    Weight as of 5/13/19: 118.8 kg (262 lb).  Physical Exam   GENERAL: healthy, alert and no distress    Diagnostic Test Results:  Labs reviewed in Epic        Assessment & Plan     1. Anxiety  Uncontrolled - increase to 30mg and follow up in 3-4 months during physical - offered therapy, patient declines  - escitalopram (LEXAPRO) 20 MG tablet; Take 1.5 tablets (30 mg) by mouth daily  Dispense: 45 tablet; Refill: 5    2. Major depressive disorder, recurrent episode, " moderate (H)  See #1  - escitalopram (LEXAPRO) 20 MG tablet; Take 1.5 tablets (30 mg) by mouth daily  Dispense: 45 tablet; Refill: 5    3. Hypothyroidism, unspecified type  Stable refill - push labs to post COVID  - levothyroxine (SYNTHROID/LEVOTHROID) 75 MCG tablet; Take 1 tablet (75 mcg) by mouth daily  Dispense: 30 tablet; Refill: 5    4. PCOS (polycystic ovarian syndrome)  Stable, refill  - metFORMIN (GLUCOPHAGE) 500 MG tablet; Take 1 tablet (500 mg) by mouth 2 times daily (with meals) Office visit needed prior to additional refills.  Dispense: 60 tablet; Refill: 11    5. Fibromyalgia  Per Rheum    6. Morbid obesity (H)             Patient Instructions   Nice to meet you today.    Let's plan to increase your Lexapro to 30mg (1.5 tabs).  We will follow up on this at you physical in August.    I have sent refills of your thyroid medicine and metformin to the pharmacy.    Stay healthy and safe!    Cortney Silveira MD  Internal Medicine/Pediatrics  [facility]          Return in about 4 months (around 8/17/2020) for Preventative Visit (physical).    Cortney Silveira MD  [facility]      Video-Visit Details    Type of service:  Video Visit    Video End Time (time video stopped): 8:35am    Originating Location (pt. Location): Home    Distant Location (provider location):  [facility]     Mode of Communication:  Video Conference via [employer]    Return in about 4 months (around 8/17/2020) for Preventative Visit (physical).       Cortney Silveira MD       No

## 2023-12-13 ASSESSMENT — PATIENT HEALTH QUESTIONNAIRE - PHQ9
SUM OF ALL RESPONSES TO PHQ QUESTIONS 1-9: 11
10. IF YOU CHECKED OFF ANY PROBLEMS, HOW DIFFICULT HAVE THESE PROBLEMS MADE IT FOR YOU TO DO YOUR WORK, TAKE CARE OF THINGS AT HOME, OR GET ALONG WITH OTHER PEOPLE: SOMEWHAT DIFFICULT
SUM OF ALL RESPONSES TO PHQ QUESTIONS 1-9: 11

## 2023-12-14 ENCOUNTER — OFFICE VISIT (OUTPATIENT)
Dept: PEDIATRICS | Facility: CLINIC | Age: 38
End: 2023-12-14
Attending: PEDIATRICS
Payer: COMMERCIAL

## 2023-12-14 VITALS
SYSTOLIC BLOOD PRESSURE: 126 MMHG | OXYGEN SATURATION: 100 % | HEART RATE: 74 BPM | BODY MASS INDEX: 48.82 KG/M2 | WEIGHT: 293 LBS | TEMPERATURE: 98.3 F | DIASTOLIC BLOOD PRESSURE: 80 MMHG

## 2023-12-14 DIAGNOSIS — E28.2 PCOS (POLYCYSTIC OVARIAN SYNDROME): ICD-10-CM

## 2023-12-14 DIAGNOSIS — K52.9 CHRONIC DIARRHEA: ICD-10-CM

## 2023-12-14 DIAGNOSIS — K21.9 GASTROESOPHAGEAL REFLUX DISEASE, UNSPECIFIED WHETHER ESOPHAGITIS PRESENT: ICD-10-CM

## 2023-12-14 DIAGNOSIS — Z01.818 PREOP GENERAL PHYSICAL EXAM: Primary | ICD-10-CM

## 2023-12-14 DIAGNOSIS — R73.03 PREDIABETES: ICD-10-CM

## 2023-12-14 LAB
BASOPHILS # BLD AUTO: 0 10E3/UL (ref 0–0.2)
BASOPHILS NFR BLD AUTO: 0 %
EOSINOPHIL # BLD AUTO: 0.1 10E3/UL (ref 0–0.7)
EOSINOPHIL NFR BLD AUTO: 2 %
ERYTHROCYTE [DISTWIDTH] IN BLOOD BY AUTOMATED COUNT: 13.5 % (ref 10–15)
HCT VFR BLD AUTO: 40.2 % (ref 35–47)
HGB BLD-MCNC: 12.5 G/DL (ref 11.7–15.7)
IMM GRANULOCYTES # BLD: 0 10E3/UL
IMM GRANULOCYTES NFR BLD: 0 %
LYMPHOCYTES # BLD AUTO: 1.6 10E3/UL (ref 0.8–5.3)
LYMPHOCYTES NFR BLD AUTO: 24 %
MCH RBC QN AUTO: 28 PG (ref 26.5–33)
MCHC RBC AUTO-ENTMCNC: 31.1 G/DL (ref 31.5–36.5)
MCV RBC AUTO: 90 FL (ref 78–100)
MONOCYTES # BLD AUTO: 0.3 10E3/UL (ref 0–1.3)
MONOCYTES NFR BLD AUTO: 5 %
NEUTROPHILS # BLD AUTO: 4.7 10E3/UL (ref 1.6–8.3)
NEUTROPHILS NFR BLD AUTO: 69 %
PLATELET # BLD AUTO: 213 10E3/UL (ref 150–450)
RBC # BLD AUTO: 4.47 10E6/UL (ref 3.8–5.2)
WBC # BLD AUTO: 6.8 10E3/UL (ref 4–11)

## 2023-12-14 PROCEDURE — 99214 OFFICE O/P EST MOD 30 MIN: CPT | Performed by: PEDIATRICS

## 2023-12-14 PROCEDURE — 85025 COMPLETE CBC W/AUTO DIFF WBC: CPT | Performed by: PEDIATRICS

## 2023-12-14 PROCEDURE — 36415 COLL VENOUS BLD VENIPUNCTURE: CPT | Performed by: PEDIATRICS

## 2023-12-14 ASSESSMENT — PAIN SCALES - GENERAL: PAINLEVEL: NO PAIN (0)

## 2023-12-14 NOTE — PATIENT INSTRUCTIONS
Preparing for Your Surgery  Getting started  A nurse will call you to review your health history and instructions. They will give you an arrival time based on your scheduled surgery time. Please be ready to share:  Your doctor's clinic name and phone number  Your medical, surgical, and anesthesia history  A list of allergies and sensitivities  A list of medicines, including herbal treatments and over-the-counter drugs  Whether the patient has a legal guardian (ask how to send us the papers in advance)  Please tell us if you're pregnant--or if there's any chance you might be pregnant. Some surgeries may injure a fetus (unborn baby), so they require a pregnancy test. Surgeries that are safe for a fetus don't always need a test, and you can choose whether to have one.   If you have a child who's having surgery, please ask for a copy of Preparing for Your Child's Surgery.    Preparing for surgery  Within 10 to 30 days of surgery: Have a pre-op exam (sometimes called an H&P, or History and Physical). This can be done at a clinic or pre-operative center.  If you're having a , you may not need this exam. Talk to your care team.  At your pre-op exam, talk to your care team about all medicines you take. If you need to stop any medicines before surgery, ask when to start taking them again.  We do this for your safety. Many medicines can make you bleed too much during surgery. Some change how well surgery (anesthesia) drugs work.  Call your insurance company to let them know you're having surgery. (If you don't have insurance, call [phone].)  Call your clinic if there's any change in your health. This includes signs of a cold or flu (sore throat, runny nose, cough, rash, fever). It also includes a scrape or scratch near the surgery site.  If you have questions on the day of surgery, call your hospital or surgery center.  Eating and drinking guidelines  For your safety: Unless your surgeon tells you otherwise,  follow the guidelines below.  Eat and drink as usual until 8 hours before you arrive for surgery. After that, no food or milk.  Drink clear liquids until 2 hours before you arrive. These are liquids you can see through, like water, Gatorade, and Propel Water. They also include plain black coffee and tea (no cream or milk), candy, and breath mints. You can spit out gum when you arrive.  If you drink alcohol: Stop drinking it the night before surgery.  If your care team tells you to take medicine on the morning of surgery, it's okay to take it with a sip of water.  Preventing infection  Shower or bathe the night before and morning of your surgery. Follow the instructions your clinic gave you. (If no instructions, use regular soap.)  Don't shave or clip hair near your surgery site. We'll remove the hair if needed.  Don't smoke or vape the morning of surgery. You may chew nicotine gum up to 2 hours before surgery. A nicotine patch is okay.  Note: Some surgeries require you to completely quit smoking and nicotine. Check with your surgeon.  Your care team will make every effort to keep you safe from infection. We will:  Clean our hands often with soap and water (or an alcohol-based hand rub).  Clean the skin at your surgery site with a special soap that kills germs.  Give you a special gown to keep you warm. (Cold raises the risk of infection.)  Wear special hair covers, masks, gowns and gloves during surgery.  Give antibiotic medicine, if prescribed. Not all surgeries need antibiotics.  What to bring on the day of surgery  Photo ID and insurance card  Copy of your health care directive, if you have one  Glasses and hearing aids (bring cases)  You can't wear contacts during surgery  Inhaler and eye drops, if you use them (tell us about these when you arrive)  CPAP machine or breathing device, if you use them  A few personal items, if spending the night  If you have . . .  A pacemaker, ICD (cardiac defibrillator) or other  implant: Bring the ID card.  An implanted stimulator: Bring the remote control.  A legal guardian: Bring a copy of the certified (court-stamped) guardianship papers.  Please remove any jewelry, including body piercings. Leave jewelry and other valuables at home.  If you're going home the day of surgery  You must have a responsible adult drive you home. They should stay with you overnight as well.  If you don't have someone to stay with you, and you aren't safe to go home alone, we may keep you overnight. Insurance often won't pay for this.  After surgery  If it's hard to control your pain or you need more pain medicine, please call your surgeon's office.  Questions?   If you have any questions for your care team, list them here: _________________________________________________________________________________________________________________________________________________________________________ ____________________________________ ____________________________________ ____________________________________  For informational purposes only. Not to replace the advice of your health care provider. Copyright   2003, 2019 [employer]. All rights reserved. Clinically reviewed by Juliana Yeung MD. [employer] 798457 - REV 12/22.    How to Take Your Medication Before Surgery  - HOLD (do not take) your METFORMIN on the morning of surgery.  - No use of ibuprofen 1 day prior to surgery (ok to use tyenol.

## 2023-12-14 NOTE — PROGRESS NOTES
[facility]  [address]  [address]  [place] [address]  Phone: [phone]  Fax: [phone]  Primary Provider: Deirdre Baeza  Pre-op Performing Provider: DEIRDRE BAEZA      PREOPERATIVE EVALUATION:  Today's date: 12/14/2023    Eloina is a 38 year old, presenting for the following:  Pre-Op Exam        12/14/2023     9:21 AM   Additional Questions   Roomed by Haley Mann CMA   Accompanied by N/A         12/14/2023     9:21 AM   Patient Reported Additional Medications   Patient reports taking the following new medications N/A       Surgical Information:  Surgery/Procedure: Colonoscopy/Endoscopy  Surgery Location: [facility]  Surgeon: Dr. Grimes  Surgery Date: 12/21  Time of Surgery: 7:20 AM  Where patient plans to recover: At home with family  Fax number for surgical facility: Note does not need to be faxed, will be available electronically in Epic.    Assessment & Plan     The proposed surgical procedure is considered LOW risk.    (Z01.818) Preop general physical exam  (primary encounter diagnosis)  Comment:   Plan: CBC with platelets and differential            (K21.9) Gastroesophageal reflux disease, unspecified whether esophagitis present  Comment: continued symptoms even with PPI  Plan:     (K52.9) Chronic diarrhea  Comment: if colonoscopy ok, there had been discussion of stopping metformin.  Patient is currently using this for PCOS.  I would be ok stopping this and then would want a follow up A1C 3 months after stopping (as we may be indirectly treating diabetes in this patient).   Plan:     (E28.2) PCOS (polycystic ovarian syndrome)  Comment: metformin  Plan: hold AM    (R73.03) Prediabetes  Comment: metformin  Plan: hold AM        RECOMMENDATION:  APPROVAL GIVEN to proceed with proposed procedure, without further diagnostic evaluation.        Subjective       HPI related to upcoming procedure: Chronic reflux, unresponsive to medications, and diarrhea.  Diarrhea comes and goes, switches with constipation as well. No blood.         12/13/2023    12:17 PM   Preop Questions   1. Have you ever had a heart attack or stroke? No   2. Have you ever had surgery on your heart or blood vessels, such as a stent placement, a coronary artery bypass, or surgery on an artery in your head, neck, heart, or legs? No   3. Do you have chest pain with activity? No   4. Do you have a history of  heart failure? No   5. Do you currently have a cold, bronchitis or symptoms of other infection? No   6. Do you have a cough, shortness of breath, or wheezing? No   7. Do you or anyone in your family have previous history of blood clots? UNKNOWN   8. Do you or does anyone in your family have a serious bleeding problem such as prolonged bleeding following surgeries or cuts? UNKNOWN   9. Have you ever had problems with anemia or been told to take iron pills? No   10. Have you had any abnormal blood loss such as black, tarry or bloody stools, or abnormal vaginal bleeding? No   11. Have you ever had a blood transfusion? No   12. Are you willing to have a blood transfusion if it is medically needed before, during, or after your surgery? Yes   13. Have you or any of your relatives ever had problems with anesthesia? No   14. Do you have sleep apnea, excessive snoring or daytime drowsiness? No   15. Do you have any artifical heart valves or other implanted medical devices like a pacemaker, defibrillator, or continuous glucose monitor? No   16. Do you have artificial joints? No   17. Are you allergic to latex? No   18. Is there any chance that you may be pregnant? No     Health Care Directive:  Patient does not have a Health Care Directive of Living Will: Discussed advance care planning with patient; information given to patient to review.    Preoperative Review of :   reviewed - no record of controlled substances prescribed.      Review of Systems  Constitutional, neuro, ENT, endocrine, pulmonary,  cardiac, gastrointestinal, genitourinary, musculoskeletal, integument and psychiatric systems are negative, except as otherwise noted.    Patient Active Problem List    Diagnosis Date Noted    Lichen sclerosus 2023     Priority: Medium    Prediabetes 07/15/2022     Priority: Medium    Cyst of spleen 2021     Priority: Medium     Added automatically from request for surgery [id number]      Splenomegaly 2021     Priority: Medium    Altered bowel function 2021     Priority: Medium    History of gestational diabetes 2020     Priority: Medium    PCOS (polycystic ovarian syndrome) 2020     Priority: Medium    Fibromyalgia 2020     Priority: Medium     Follows with Rheum - This was brought about by an abnormal biopsy of her cervix by her OB/GYN (DR. Nicolas at [facility]) - found inflammation and then patient found to have +PAOLA      Obesity (H) 10/25/2018     Priority: Medium    Anxiety 2018     Priority: Medium    Hypothyroidism, unspecified type 2018     Priority: Medium    Major depressive disorder, recurrent episode, moderate (H) 2003     Priority: Medium      Past Medical History:   Diagnosis Date    Anorexia     From age [age].    Asthma with exacerbation 2022    Chronic depressive personality disorder     hx of anxiety and depression    Depressive disorder     Gastroesophageal reflux disease     PCOS (polycystic ovarian syndrome)     Thyroid disease     Tympanosclerosis, unspecified as to involvement      Past Surgical History:   Procedure Laterality Date    APPENDECTOMY      BIOPSY  10/4/2019    Biopsy of cervix.     SECTION       SECTION N/A 3/29/2018    Procedure:  SECTION;  Repeat  Section;  Surgeon: Fern Nicolas MD;  Location: [facility] L+D    EYE SURGERY Bilateral     muscle surgery for wandering eyes    IR SUBDIAPHRAGM ABSCESS DRAIN  2021    LAPAROSCOPIC SPLENECTOMY N/A 2021    Procedure: LAPAROSCOPIC  HAND ASSISTED SPLENIC CYST EXCISION;  Surgeon: Lewis Soni MD;  Location: [facility] OR    ORTHOPEDIC SURGERY Right     major reconstruction right shoulder    TYMPANOPLASTY Left      Current Outpatient Medications   Medication Sig Dispense Refill    albuterol (PROAIR HFA/PROVENTIL HFA/VENTOLIN HFA) 108 (90 Base) MCG/ACT inhaler Inhale 2 puffs into the lungs every 4 hours as needed for shortness of breath, wheezing or cough 18 g 0    albuterol (PROAIR HFA/PROVENTIL HFA/VENTOLIN HFA) 108 (90 Base) MCG/ACT inhaler Inhale 2 puffs into the lungs every 4 hours as needed for shortness of breath / dyspnea or wheezing 18 g 1    buPROPion (WELLBUTRIN SR) 200 MG 12 hr tablet Take 1 tablet (200 mg) by mouth 2 times daily 60 tablet 5    clindamycin (CLINDAMAX) 1 % external gel Apply to AA QD 60 g 11    clobetasol (TEMOVATE) 0.05 % external ointment       escitalopram (LEXAPRO) 20 MG tablet TAKE 1 AND 1/2 TABLETS BY MOUTH DAILY 45 tablet 6    fluocinonide (LIDEX) 0.05 % external solution Apply to scalp BID x 1-2 weeks PRN 50 mL 3    levothyroxine (SYNTHROID/LEVOTHROID) 75 MCG tablet Take 1 tablet (75 mcg) by mouth daily 30 tablet 11    metFORMIN (GLUCOPHAGE) 500 MG tablet Take 1 tablet (500 mg) by mouth 2 times daily (with meals) 60 tablet 4    norethindrone-ethinyl estradiol (JUNEL FE 1/20) 1-20 MG-MCG tablet       omeprazole (PRILOSEC) 40 MG DR capsule Take 1 capsule (40 mg) by mouth daily 90 capsule 3    triamcinolone (KENALOG) 0.1 % external cream Apply topically 3 times daily as needed (psoriasis) 30 g 1    Vitamin D, Cholecalciferol, 25 MCG (1000 UT) TABS Take 1 tablet by mouth daily         Allergies   Allergen Reactions    Atarax [Hydroxyzine] Palpitations    Sulfa Antibiotics Swelling and Rash        Social History     Tobacco Use    Smoking status: Never    Smokeless tobacco: Never   Substance Use Topics    Alcohol use: No       History   Drug Use No         Objective     LMP 10/03/2023     Physical Exam    GENERAL  APPEARANCE: healthy, alert and no distress     EYES: EOMI, PERRL     HENT: ear canals and TM's normal and nose and mouth without ulcers or lesions     NECK: right 1cm submandibular lymphadenopathy, non tender, mobile     RESP: lungs clear to auscultation - no rales, rhonchi or wheezes     CV: regular rates and rhythm, normal S1 S2, no S3 or S4 and no murmur, click or rub     ABDOMEN:  soft, nontender, no HSM or masses and bowel sounds normal     MS: extremities normal- no gross deformities noted, no evidence of inflammation in joints, FROM in all extremities.     SKIN: no suspicious lesions or rashes     NEURO: Normal strength and tone, sensory exam grossly normal, mentation intact and speech normal     PSYCH: mentation appears normal. and affect normal/bright     LYMPHATICS: No cervical adenopathy    Recent Labs   Lab Test 11/03/23  1028 02/08/23  1017 11/16/22  1242 07/15/22  0930   HGB  --  12.7  --  12.9   PLT  --  218  --  187   NA  --   --  141 139   POTASSIUM  --   --  4.2 4.1   CR  --   --  1.00* 0.90   A1C 5.7* 5.9* 6.0* 5.7*        Diagnostics:  Labs pending at this time.  Results will be reviewed when available.   No EKG required for low risk surgery (cataract, skin procedure, breast biopsy, etc).    Revised Cardiac Risk Index (RCRI):  The patient has the following serious cardiovascular risks for perioperative complications:   - No serious cardiac risks = 0 points     RCRI Interpretation: 0 points: Class I (very low risk - 0.4% complication rate)         Signed Electronically by: Cortney Silveira MD  Copy of this evaluation report is provided to requesting physician.

## 2023-12-21 ENCOUNTER — ANESTHESIA (OUTPATIENT)
Dept: SURGERY | Facility: CLINIC | Age: 38
End: 2023-12-21
Payer: COMMERCIAL

## 2023-12-21 ENCOUNTER — ANESTHESIA EVENT (OUTPATIENT)
Dept: SURGERY | Facility: CLINIC | Age: 38
End: 2023-12-21
Payer: COMMERCIAL

## 2023-12-21 ENCOUNTER — HOSPITAL ENCOUNTER (OUTPATIENT)
Facility: CLINIC | Age: 38
Discharge: HOME | End: 2023-12-21
Attending: INTERNAL MEDICINE | Admitting: INTERNAL MEDICINE
Payer: COMMERCIAL

## 2023-12-21 VITALS
HEART RATE: 70 BPM | BODY MASS INDEX: 47.76 KG/M2 | RESPIRATION RATE: 16 BRPM | WEIGHT: 293 LBS | SYSTOLIC BLOOD PRESSURE: 111 MMHG | DIASTOLIC BLOOD PRESSURE: 67 MMHG | TEMPERATURE: 98 F | OXYGEN SATURATION: 100 %

## 2023-12-21 LAB
COLONOSCOPY: NORMAL
UPPER GI ENDOSCOPY: NORMAL

## 2023-12-21 PROCEDURE — 272N000001 HC OR GENERAL SUPPLY STERILE: Performed by: INTERNAL MEDICINE

## 2023-12-21 PROCEDURE — 370N000017 HC ANESTHESIA TECHNICAL FEE, PER MIN: Performed by: INTERNAL MEDICINE

## 2023-12-21 PROCEDURE — 710N000012 HC RECOVERY PHASE 2, PER MINUTE: Performed by: INTERNAL MEDICINE

## 2023-12-21 PROCEDURE — 88305 TISSUE EXAM BY PATHOLOGIST: CPT | Mod: TC | Performed by: INTERNAL MEDICINE

## 2023-12-21 PROCEDURE — 250N000011 HC RX IP 250 OP 636: Performed by: ANESTHESIOLOGY

## 2023-12-21 PROCEDURE — 999N000141 HC STATISTIC PRE-PROCEDURE NURSING ASSESSMENT: Performed by: INTERNAL MEDICINE

## 2023-12-21 PROCEDURE — 360N000075 HC SURGERY LEVEL 2, PER MIN: Performed by: INTERNAL MEDICINE

## 2023-12-21 PROCEDURE — 250N000009 HC RX 250: Performed by: ANESTHESIOLOGY

## 2023-12-21 PROCEDURE — 88305 TISSUE EXAM BY PATHOLOGIST: CPT | Mod: 26 | Performed by: PATHOLOGY

## 2023-12-21 RX ORDER — ONDANSETRON 4 MG/1
4 TABLET, ORALLY DISINTEGRATING ORAL EVERY 6 HOURS PRN
Status: DISCONTINUED | OUTPATIENT
Start: 2023-12-21 | End: 2023-12-21 | Stop reason: HOSPADM

## 2023-12-21 RX ORDER — OXYCODONE HYDROCHLORIDE 5 MG/1
5 TABLET ORAL
Status: DISCONTINUED | OUTPATIENT
Start: 2023-12-21 | End: 2023-12-21 | Stop reason: HOSPADM

## 2023-12-21 RX ORDER — FENTANYL CITRATE 50 UG/ML
INJECTION, SOLUTION INTRAMUSCULAR; INTRAVENOUS PRN
Status: DISCONTINUED | OUTPATIENT
Start: 2023-12-21 | End: 2023-12-21

## 2023-12-21 RX ORDER — ONDANSETRON 2 MG/ML
4 INJECTION INTRAMUSCULAR; INTRAVENOUS EVERY 30 MIN PRN
Status: DISCONTINUED | OUTPATIENT
Start: 2023-12-21 | End: 2023-12-21 | Stop reason: HOSPADM

## 2023-12-21 RX ORDER — PROPOFOL 10 MG/ML
INJECTION, EMULSION INTRAVENOUS PRN
Status: DISCONTINUED | OUTPATIENT
Start: 2023-12-21 | End: 2023-12-21

## 2023-12-21 RX ORDER — FLUMAZENIL 0.1 MG/ML
0.2 INJECTION, SOLUTION INTRAVENOUS
Status: DISCONTINUED | OUTPATIENT
Start: 2023-12-21 | End: 2023-12-21 | Stop reason: HOSPADM

## 2023-12-21 RX ORDER — OXYCODONE HYDROCHLORIDE 5 MG/1
10 TABLET ORAL
Status: DISCONTINUED | OUTPATIENT
Start: 2023-12-21 | End: 2023-12-21 | Stop reason: HOSPADM

## 2023-12-21 RX ORDER — ONDANSETRON 2 MG/ML
INJECTION INTRAMUSCULAR; INTRAVENOUS PRN
Status: DISCONTINUED | OUTPATIENT
Start: 2023-12-21 | End: 2023-12-21

## 2023-12-21 RX ORDER — SODIUM CHLORIDE, SODIUM LACTATE, POTASSIUM CHLORIDE, CALCIUM CHLORIDE 600; 310; 30; 20 MG/100ML; MG/100ML; MG/100ML; MG/100ML
INJECTION, SOLUTION INTRAVENOUS CONTINUOUS
Status: DISCONTINUED | OUTPATIENT
Start: 2023-12-21 | End: 2023-12-21 | Stop reason: HOSPADM

## 2023-12-21 RX ORDER — NALOXONE HYDROCHLORIDE 0.4 MG/ML
0.4 INJECTION, SOLUTION INTRAMUSCULAR; INTRAVENOUS; SUBCUTANEOUS
Status: DISCONTINUED | OUTPATIENT
Start: 2023-12-21 | End: 2023-12-21 | Stop reason: HOSPADM

## 2023-12-21 RX ORDER — ONDANSETRON 2 MG/ML
4 INJECTION INTRAMUSCULAR; INTRAVENOUS EVERY 6 HOURS PRN
Status: DISCONTINUED | OUTPATIENT
Start: 2023-12-21 | End: 2023-12-21 | Stop reason: HOSPADM

## 2023-12-21 RX ORDER — ONDANSETRON 4 MG/1
4 TABLET, ORALLY DISINTEGRATING ORAL EVERY 30 MIN PRN
Status: DISCONTINUED | OUTPATIENT
Start: 2023-12-21 | End: 2023-12-21 | Stop reason: HOSPADM

## 2023-12-21 RX ORDER — ONDANSETRON 2 MG/ML
4 INJECTION INTRAMUSCULAR; INTRAVENOUS
Status: DISCONTINUED | OUTPATIENT
Start: 2023-12-21 | End: 2023-12-21 | Stop reason: HOSPADM

## 2023-12-21 RX ORDER — PROPOFOL 10 MG/ML
INJECTION, EMULSION INTRAVENOUS CONTINUOUS PRN
Status: DISCONTINUED | OUTPATIENT
Start: 2023-12-21 | End: 2023-12-21

## 2023-12-21 RX ORDER — NALOXONE HYDROCHLORIDE 0.4 MG/ML
0.2 INJECTION, SOLUTION INTRAMUSCULAR; INTRAVENOUS; SUBCUTANEOUS
Status: DISCONTINUED | OUTPATIENT
Start: 2023-12-21 | End: 2023-12-21 | Stop reason: HOSPADM

## 2023-12-21 RX ORDER — PROCHLORPERAZINE MALEATE 10 MG
10 TABLET ORAL EVERY 6 HOURS PRN
Status: DISCONTINUED | OUTPATIENT
Start: 2023-12-21 | End: 2023-12-21 | Stop reason: HOSPADM

## 2023-12-21 RX ORDER — LIDOCAINE 40 MG/G
CREAM TOPICAL
Status: DISCONTINUED | OUTPATIENT
Start: 2023-12-21 | End: 2023-12-21 | Stop reason: HOSPADM

## 2023-12-21 RX ORDER — LIDOCAINE HYDROCHLORIDE 20 MG/ML
INJECTION, SOLUTION INFILTRATION; PERINEURAL PRN
Status: DISCONTINUED | OUTPATIENT
Start: 2023-12-21 | End: 2023-12-21

## 2023-12-21 RX ORDER — DEXAMETHASONE SODIUM PHOSPHATE 4 MG/ML
INJECTION, SOLUTION INTRA-ARTICULAR; INTRALESIONAL; INTRAMUSCULAR; INTRAVENOUS; SOFT TISSUE PRN
Status: DISCONTINUED | OUTPATIENT
Start: 2023-12-21 | End: 2023-12-21

## 2023-12-21 RX ADMIN — PROPOFOL 20 MG: 10 INJECTION, EMULSION INTRAVENOUS at 08:59

## 2023-12-21 RX ADMIN — MIDAZOLAM 2 MG: 1 INJECTION INTRAMUSCULAR; INTRAVENOUS at 08:39

## 2023-12-21 RX ADMIN — PROPOFOL 50 MG: 10 INJECTION, EMULSION INTRAVENOUS at 08:47

## 2023-12-21 RX ADMIN — PROPOFOL 50 MG: 10 INJECTION, EMULSION INTRAVENOUS at 08:50

## 2023-12-21 RX ADMIN — ONDANSETRON 4 MG: 2 INJECTION INTRAMUSCULAR; INTRAVENOUS at 09:18

## 2023-12-21 RX ADMIN — PROPOFOL 150 MCG/KG/MIN: 10 INJECTION, EMULSION INTRAVENOUS at 08:46

## 2023-12-21 RX ADMIN — DEXAMETHASONE SODIUM PHOSPHATE 4 MG: 4 INJECTION, SOLUTION INTRA-ARTICULAR; INTRALESIONAL; INTRAMUSCULAR; INTRAVENOUS; SOFT TISSUE at 09:18

## 2023-12-21 RX ADMIN — LIDOCAINE HYDROCHLORIDE 30 MG: 20 INJECTION, SOLUTION INFILTRATION; PERINEURAL at 08:46

## 2023-12-21 RX ADMIN — PROPOFOL 30 MG: 10 INJECTION, EMULSION INTRAVENOUS at 08:53

## 2023-12-21 RX ADMIN — PROPOFOL 50 MG: 10 INJECTION, EMULSION INTRAVENOUS at 08:57

## 2023-12-21 RX ADMIN — FENTANYL CITRATE 100 MCG: 50 INJECTION INTRAMUSCULAR; INTRAVENOUS at 09:10

## 2023-12-21 ASSESSMENT — ACTIVITIES OF DAILY LIVING (ADL)
ADLS_ACUITY_SCORE: 35
ADLS_ACUITY_SCORE: 33

## 2023-12-21 NOTE — ANESTHESIA PREPROCEDURE EVALUATION
Anesthesia Pre-Procedure Evaluation    Patient: Eloina Aj   MRN: [id number] : 1985        Procedure : Procedure(s):  ESOPHAGOGASTRODUODENOSCOPY  Colonoscopy          Past Medical History:   Diagnosis Date    Anorexia     From age [age].    Asthma with exacerbation 2022    Chronic depressive personality disorder     hx of anxiety and depression    Depressive disorder     Gastroesophageal reflux disease     PCOS (polycystic ovarian syndrome)     Thyroid disease     Tympanosclerosis, unspecified as to involvement       Past Surgical History:   Procedure Laterality Date    APPENDECTOMY      BIOPSY  10/4/2019    Biopsy of cervix.     SECTION       SECTION N/A 3/29/2018    Procedure:  SECTION;  Repeat  Section;  Surgeon: Fern Nicolas MD;  Location: [facility]    EYE SURGERY Bilateral     muscle surgery for wandering eyes    IR SUBDIAPHRAGM ABSCESS DRAIN  2021    LAPAROSCOPIC SPLENECTOMY N/A 2021    Procedure: LAPAROSCOPIC HAND ASSISTED SPLENIC CYST EXCISION;  Surgeon: Lewis Soni MD;  Location: [facility] OR    ORTHOPEDIC SURGERY Right     major reconstruction right shoulder    TYMPANOPLASTY Left       Allergies   Allergen Reactions    Atarax [Hydroxyzine] Palpitations    Sulfa Antibiotics Swelling and Rash      Social History     Tobacco Use    Smoking status: Never    Smokeless tobacco: Never   Substance Use Topics    Alcohol use: No      Wt Readings from Last 1 Encounters:   23 136.3 kg (300 lb 8 oz)        Anesthesia Evaluation   Pt has had prior anesthetic. Type: General.    No history of anesthetic complications       ROS/MED HX  ENT/Pulmonary:     (+)                     Intermittent, asthma                  Neurologic:  - neg neurologic ROS     Cardiovascular:  - neg cardiovascular ROS     METS/Exercise Tolerance:     Hematologic:  - neg hematologic  ROS     Musculoskeletal:  - neg musculoskeletal ROS     GI/Hepatic:     (+) GERD,                    Renal/Genitourinary:       Endo:     (+)          thyroid problem, hypothyroidism,    Obesity,       Psychiatric/Substance Use:     (+) psychiatric history anxiety and depression       Infectious Disease:  - neg infectious disease ROS     Malignancy:       Other:            Physical Exam    Airway        Mallampati: II   TM distance: > 3 FB   Neck ROM: full   Mouth opening: > 3 cm    Respiratory Devices and Support         Dental       (+) Modest Abnormalities - crowns, retainers, 1 or 2 missing teeth      Cardiovascular   cardiovascular exam normal          Pulmonary   pulmonary exam normal                OUTSIDE LABS:  CBC:   Lab Results   Component Value Date    WBC 6.8 12/14/2023    WBC 6.6 02/08/2023    HGB 12.5 12/14/2023    HGB 12.7 02/08/2023    HCT 40.2 12/14/2023    HCT 39.7 02/08/2023     12/14/2023     02/08/2023     BMP:   Lab Results   Component Value Date     11/16/2022     07/15/2022    POTASSIUM 4.2 11/16/2022    POTASSIUM 4.1 07/15/2022    CHLORIDE 105 11/16/2022    CHLORIDE 105 07/15/2022    CO2 24 11/16/2022    CO2 26 07/15/2022    BUN 12.8 11/16/2022    BUN 11 07/15/2022    CR 1.00 (H) 11/16/2022    CR 0.90 07/15/2022    GLC 98 11/16/2022    GLC 98 07/15/2022     COAGS:   Lab Results   Component Value Date    INR 1.05 03/25/2021     POC:   Lab Results   Component Value Date     (H) 05/13/2021    HCG Negative 05/12/2021     HEPATIC:   Lab Results   Component Value Date    ALBUMIN 4.1 11/16/2022    PROTTOTAL 6.3 (L) 11/16/2022    ALT 17 11/16/2022    AST 21 11/16/2022    ALKPHOS 75 11/16/2022    BILITOTAL 0.2 11/16/2022     OTHER:   Lab Results   Component Value Date    A1C 5.7 (H) 11/03/2023    JENNIFER 8.7 11/16/2022    TSH 2.34 02/08/2023    T4 1.00 10/24/2018    CRP 21.0 (H) 03/05/2021    SED 13 03/05/2021       Anesthesia Plan    ASA Status:  3       Anesthesia Type: MAC.     - Reason for MAC: straight local not clinically adequate               Consents    Anesthesia Plan(s) and associated risks, benefits, and realistic alternatives discussed. Questions answered and patient/representative(s) expressed understanding.     - Discussed:     - Discussed with:  Patient      - Extended Intubation/Ventilatory Support Discussed: No.      - Patient is DNR/DNI Status: No     Use of blood products discussed: No .     Postoperative Care    Pain management: Oral pain medications, Multi-modal analgesia.   PONV prophylaxis: Ondansetron (or other 5HT-3)     Comments:               Parag Hardy MD    I have reviewed the pertinent notes and labs in the chart from the past 30 days and (re)examined the patient.  Any updates or changes from those notes are reflected in this note.

## 2023-12-21 NOTE — ANESTHESIA CARE TRANSFER NOTE
Patient: Eloina Aj    Procedure: Procedure(s):  ESOPHAGOGASTRODUODENOSCOPY with biopsies  Colonoscopy wtih biopsies       Diagnosis: Chronic diarrhea [K52.9]  Heartburn [R12]  Diagnosis Additional Information: No value filed.    Anesthesia Type:   MAC     Note:    Oropharynx: oropharynx clear of all foreign objects and spontaneously breathing  Level of Consciousness: awake  Oxygen Supplementation: room air    Independent Airway: airway patency satisfactory and stable  Dentition: dentition unchanged  Vital Signs Stable: post-procedure vital signs reviewed and stable  Report to RN Given: handoff report given  Patient transferred to: Phase II    Handoff Report: Identifed the Patient, Identified the Reponsible Provider, Reviewed the pertinent medical history, Discussed the surgical course, Reviewed Intra-OP anesthesia mangement and issues during anesthesia, Set expectations for post-procedure period and Allowed opportunity for questions and acknowledgement of understanding  Vitals:  Vitals Value Taken Time   /60 12/21/23 0945   Temp 98.9  F (37.2  C) 12/21/23 0933   Pulse 63 12/21/23 0945   Resp 18 12/21/23 0943   SpO2 98 % 12/21/23 0946   Vitals shown include unfiled device data.    Electronically Signed By: NATHANAEL Worley CRNA  December 21, 2023  9:59 AM   Winlevi Pregnancy And Lactation Text: This medication is considered safe during pregnancy and breastfeeding.

## 2023-12-21 NOTE — ANESTHESIA POSTPROCEDURE EVALUATION
Patient: Eloina Aj    Procedure: Procedure(s):  ESOPHAGOGASTRODUODENOSCOPY with biopsies  Colonoscopy wtih biopsies       Anesthesia Type:  MAC    Note:  Disposition: Outpatient   Postop Pain Control: Uneventful            Sign Out: Well controlled pain   PONV: No   Neuro/Psych: Uneventful            Sign Out: Acceptable/Baseline neuro status   Airway/Respiratory: Uneventful            Sign Out: Acceptable/Baseline resp. status   CV/Hemodynamics: Uneventful            Sign Out: Acceptable CV status; No obvious hypovolemia; No obvious fluid overload   Other NRE: NONE   DID A NON-ROUTINE EVENT OCCUR? No           Last vitals:  Vitals Value Taken Time   /67 12/21/23 1015   Temp 98  F (36.7  C) 12/21/23 1002   Pulse 70 12/21/23 1015   Resp 16 12/21/23 1015   SpO2 98 % 12/21/23 1019   Vitals shown include unfiled device data.    Electronically Signed By: Parag Hardy MD  December 21, 2023  11:54 AM

## 2023-12-21 NOTE — DISCHARGE INSTRUCTIONS
DR. DAVID HOOK M.D.   CLINIC PHONE NUMBER:  [phone]     ESOPHAGOGASTRODUODENOSCOPY DISCHARGE INSTRUCTIONS    You may not drive, use heavy equipment or consume alcohol for 24 hours because the drugs you were given may cause dizziness, drowsiness, forgetfulness and slower reaction time.    Small pieces or tissue (biopsies) or polyps may have been removed.    You may resume your regular diet and medications. Exception: If you had a biopsy or polypectomy, do not take aspirin, aleve (naproxen) or ibuprofen for the next 10 days.  Tylenol (acetaminophen) is safe to take.    Additional instructions:  If you had a biopsy or polypectomy, the pathology report will be sent to your doctor.  If you have not received the results within 10 days, call your doctor's office.    What to watch for:  Problems rarely occur after the procedure.  It is important for you to be aware of the early signs of a possible complication.  Call immediately if you notice any of the followin.  Unusual pain or difficulty swallowing.    2.  Unusual abdominal or chest pain.    3.  Vomiting of blood.    4.  Black or bloody stools.    5.  Temperature above 100.6 degrees F        COLONOSCOPY DISCHARGE INSTRUCTIONS    You may not drive, use heavy equipment or consume alcohol for 24 hours because the drugs you were given may cause dizziness, drowsiness, forgetfulness and slower reaction time.    You may resume your regular diet and medications.    If you had a biopsy or polypectomy done, do not take aspirin, aleve (naproxen) or ibuprofen products for the next 10 days.  Tylenol (acetaminophen) is safe to take.    What to watch for:    Problems rarely occur after the exam.  It is important for you to be aware of the early signs of a possible complication.  Call your doctor immediately if you notice any of the followin.  Unusual or persistent abdominal pain (pain that does not move around like a gas pain).    2.  Passing bright red blood from  your rectum.    3.  Black or bloody stools.    4.  Temperature above 100.6 degrees F (37.5 degrees C)    If you feel this has become a medical emergency please call 911.   GENERAL ANESTHESIA OR SEDATION ADULT DISCHARGE INSTRUCTIONS   SPECIAL PRECAUTIONS FOR 24 HOURS AFTER SURGERY    IT IS NOT UNUSUAL TO FEEL LIGHT-HEADED OR FAINT, UP TO 24 HOURS AFTER SURGERY OR WHILE TAKING PAIN MEDICATION.  IF YOU HAVE THESE SYMPTOMS; SIT FOR A FEW MINUTES BEFORE STANDING AND HAVE SOMEONE ASSIST YOU WHEN YOU GET UP TO WALK OR USE THE BATHROOM.    YOU SHOULD REST AND RELAX FOR THE NEXT 24 HOURS AND YOU MUST MAKE ARRANGEMENTS TO HAVE SOMEONE STAY WITH YOU FOR AT LEAST 24 HOURS AFTER YOUR DISCHARGE.  AVOID HAZARDOUS AND STRENUOUS ACTIVITIES.  DO NOT MAKE IMPORTANT DECISIONS FOR 24 HOURS.    DO NOT DRIVE ANY VEHICLE OR OPERATE MECHANICAL EQUIPMENT FOR 24 HOURS FOLLOWING THE END OF YOUR SURGERY.  EVEN THOUGH YOU MAY FEEL NORMAL, YOUR REACTIONS MAY BE AFFECTED BY THE MEDICATION YOU HAVE RECEIVED.    DO NOT DRINK ALCOHOLIC BEVERAGES FOR 24 HOURS FOLLOWING YOUR SURGERY.    DRINK CLEAR LIQUIDS (APPLE JUICE, GINGER ALE, 7-UP, BROTH, ETC.).  PROGRESS TO YOUR REGULAR DIET AS YOU FEEL ABLE.    YOU MAY HAVE A DRY MOUTH, A SORE THROAT, MUSCLES ACHES OR TROUBLE SLEEPING.  THESE SHOULD GO AWAY AFTER 24 HOURS.    CALL YOUR DOCTOR FOR ANY OF THE FOLLOWING:  SIGNS OF INFECTION (FEVER, GROWING TENDERNESS AT THE SURGERY SITE, A LARGE AMOUNT OF DRAINAGE OR BLEEDING, SEVERE PAIN, FOUL-SMELLING DRAINAGE, REDNESS OR SWELLING.    IT HAS BEEN OVER 8 TO 10 HOURS SINCE SURGERY AND YOU ARE STILL NOT ABLE TO URINATE (PASS WATER).

## 2023-12-21 NOTE — OR NURSING
Pt spouse arrived and at this time discharge instructions reviewed and also her colonoscopy and egd instructions reviewed.  All questions answered home with spouse

## 2024-01-05 ENCOUNTER — TRANSFERRED RECORDS (OUTPATIENT)
Dept: HEALTH INFORMATION MANAGEMENT | Facility: CLINIC | Age: 39
End: 2024-01-05
Payer: COMMERCIAL

## 2024-04-22 ENCOUNTER — E-VISIT (OUTPATIENT)
Dept: URGENT CARE | Facility: CLINIC | Age: 39
End: 2024-04-22
Payer: COMMERCIAL

## 2024-04-22 DIAGNOSIS — J06.9 VIRAL URI WITH COUGH: Primary | ICD-10-CM

## 2024-04-22 PROCEDURE — 99421 OL DIG E/M SVC 5-10 MIN: CPT | Performed by: NURSE PRACTITIONER

## 2024-04-22 RX ORDER — PREDNISONE 20 MG/1
40 TABLET ORAL DAILY
Qty: 10 TABLET | Refills: 0 | Status: SHIPPED | OUTPATIENT
Start: 2024-04-22 | End: 2024-04-27

## 2024-04-22 RX ORDER — ALBUTEROL SULFATE 90 UG/1
2 AEROSOL, METERED RESPIRATORY (INHALATION) EVERY 6 HOURS PRN
Qty: 18 G | Refills: 0 | Status: SHIPPED | OUTPATIENT
Start: 2024-04-22

## 2024-04-22 NOTE — PATIENT INSTRUCTIONS
Thank you for choosing us for your care. I have placed an order for a prescription so that you can start treatment. View your full visit summary for details by clicking on the link below. Your pharmacist will able to address any questions you may have about the medication.     If you're not feeling better within 5-7 days, please schedule an appointment.  You can schedule an appointment right here in [facility], or call [phone]  If the visit is for the same symptoms as your eVisit, we'll refund the cost of your eVisit if seen within seven days.

## 2024-04-24 ENCOUNTER — APPOINTMENT (OUTPATIENT)
Dept: GENERAL RADIOLOGY | Facility: CLINIC | Age: 39
End: 2024-04-24
Payer: COMMERCIAL

## 2024-04-24 ENCOUNTER — HOSPITAL ENCOUNTER (EMERGENCY)
Facility: CLINIC | Age: 39
Discharge: HOME | End: 2024-04-24
Payer: COMMERCIAL

## 2024-04-24 VITALS
SYSTOLIC BLOOD PRESSURE: 108 MMHG | BODY MASS INDEX: 48.82 KG/M2 | HEIGHT: 65 IN | WEIGHT: 293 LBS | HEART RATE: 110 BPM | RESPIRATION RATE: 22 BRPM | OXYGEN SATURATION: 92 % | DIASTOLIC BLOOD PRESSURE: 57 MMHG | TEMPERATURE: 99.9 F

## 2024-04-24 DIAGNOSIS — J98.01 BRONCHOSPASM: ICD-10-CM

## 2024-04-24 DIAGNOSIS — J18.9 PNEUMONIA OF LEFT UPPER LOBE DUE TO INFECTIOUS ORGANISM: Primary | ICD-10-CM

## 2024-04-24 DIAGNOSIS — J18.9 RIGHT LOWER LOBE PNEUMONIA: ICD-10-CM

## 2024-04-24 LAB — GROUP A STREP BY PCR: NOT DETECTED

## 2024-04-24 PROCEDURE — 94640 AIRWAY INHALATION TREATMENT: CPT

## 2024-04-24 PROCEDURE — 87651 STREP A DNA AMP PROBE: CPT | Performed by: EMERGENCY MEDICINE

## 2024-04-24 PROCEDURE — 250N000009 HC RX 250

## 2024-04-24 PROCEDURE — 99284 EMERGENCY DEPT VISIT MOD MDM: CPT | Mod: 25

## 2024-04-24 PROCEDURE — 71046 X-RAY EXAM CHEST 2 VIEWS: CPT

## 2024-04-24 RX ORDER — AZITHROMYCIN 250 MG/1
TABLET, FILM COATED ORAL
Qty: 6 TABLET | Refills: 0 | Status: SHIPPED | OUTPATIENT
Start: 2024-04-24 | End: 2024-04-29

## 2024-04-24 RX ORDER — IPRATROPIUM BROMIDE AND ALBUTEROL SULFATE 2.5; .5 MG/3ML; MG/3ML
6 SOLUTION RESPIRATORY (INHALATION) ONCE
Status: COMPLETED | OUTPATIENT
Start: 2024-04-24 | End: 2024-04-24

## 2024-04-24 RX ADMIN — IPRATROPIUM BROMIDE AND ALBUTEROL SULFATE 6 ML: .5; 3 SOLUTION RESPIRATORY (INHALATION) at 09:43

## 2024-04-24 ASSESSMENT — COLUMBIA-SUICIDE SEVERITY RATING SCALE - C-SSRS
6. HAVE YOU EVER DONE ANYTHING, STARTED TO DO ANYTHING, OR PREPARED TO DO ANYTHING TO END YOUR LIFE?: NO
1. IN THE PAST MONTH, HAVE YOU WISHED YOU WERE DEAD OR WISHED YOU COULD GO TO SLEEP AND NOT WAKE UP?: NO
2. HAVE YOU ACTUALLY HAD ANY THOUGHTS OF KILLING YOURSELF IN THE PAST MONTH?: NO

## 2024-04-24 ASSESSMENT — ACTIVITIES OF DAILY LIVING (ADL)
ADLS_ACUITY_SCORE: 37
ADLS_ACUITY_SCORE: 37

## 2024-04-24 NOTE — ED TRIAGE NOTES
Pt presents with sore throat and cough since Friday. Had e visit Monday and was prescribed prednisone and albuterol inhaler. Has been using as prescribed. Pt reports feeling worse, increased SOB and wheezing. A & Ox4.      Triage Assessment (Adult)       Row Name 04/24/24 0918          Triage Assessment    Airway WDL WDL        Respiratory WDL    Respiratory WDL X;cough;all     Rhythm/Pattern, Respiratory shortness of breath        Skin Circulation/Temperature WDL    Skin Circulation/Temperature WDL WDL        Cardiac WDL    Cardiac WDL WDL        Peripheral/Neurovascular WDL    Peripheral Neurovascular WDL WDL        Cognitive/Neuro/Behavioral WDL    Cognitive/Neuro/Behavioral WDL WDL

## 2024-04-24 NOTE — ED PROVIDER NOTES
History     Chief Complaint:  Shortness of Breath, Cough, and Pharyngitis     HPI   Eloina Aj is a 38 year old female with history of exercise-induced asthma who presents to the ED with shortness of breath, cough, and pharyngitis. Patient states that she developed a sore throat five days ago. Later that evening she had a temperature of 100.7 F. The following two days she had a cough and felt fatigued, causing her to stay in bed most of the day. Also endorses wheezing. She had a virtual visit two days ago and was prescribed prednisone and Albuterol for her symptoms. She has been using these medications as prescribed, though feels her symptoms are worsening. She notes that her cough has especially been getting more severe, while her sore throat is staying the same. She took her prednisone prior to arrival today. Eloina adds that her son recently had pink eye and an ear infection.     Independent Historian:   None - Patient Only    Review of External Notes:   I reviewed the  E-visit on 24 where she was seen for a viral URI with a cough. She was prescribed prednisone and Albuterol during this visit.      Medications:    Pro-air   Wellbutrin   Lexapro   Synthroid   Glucophage   Junel FE  Prilosec   Deltasone     Past Medical History:    Anorexia   Asthma with exacerbation   Chronic depressive personality disorder   GERD  PCOS   Thyroid disease   Tympanosclerosis   Anxiety   Hypothyroidism   Fibromyalgia   GDM  Altered bowel function   Splenomegaly   Cyst of spleen   Prediabetes   Lichen sclerosus   Contact lens-induced corneal disorder   Ovarian cyst right     Past Surgical History:    Appendectomy    section x2   Colonoscopy   EGD  Eye muscle surgery for wandering eyes   IR subdiaphragm abscess drain   Laparoscopic splenectomy   Major reconstruction shoulder, right   Tympanostomy, left      Physical Exam   Patient Vitals for the past 24 hrs:   BP Temp Temp src Pulse Resp SpO2 Height Weight  "  04/24/24 1100 108/57 -- -- 110 -- 92 % -- --   04/24/24 1045 109/66 -- -- 79 -- 90 % -- --   04/24/24 1030 101/66 -- -- 84 -- (!) 89 % -- --   04/24/24 1015 106/65 -- -- 81 -- 90 % -- --   04/24/24 0947 98/52 -- -- 77 -- 97 % -- --   04/24/24 0932 106/66 -- -- 78 -- 93 % -- --   04/24/24 0931 -- -- -- -- -- 96 % -- --   04/24/24 0916 134/76 99.9  F (37.7  C) Oral 96 22 94 % 1.651 m (5' 5\") 136.3 kg (300 lb 7.8 oz)      Physical Exam  /57   Pulse 110   Temp 99.9  F (37.7  C) (Oral)   Resp 22   Ht 1.651 m (5' 5\")   Wt 136.3 kg (300 lb 7.8 oz)   LMP 03/21/2024 (Approximate)   SpO2 92%   BMI 50.00 kg/m     General: Appears stated age. Examined in room 38.    Head: Atraumatic. Normocephalic.  EENT: PERRL. EOMI. Moist mucus membranes. Mild posterior oropharyngeal erythema.  No tonsillar edema or exudate. Uvula midline.  CV: Regular rate and rhythm.   Respiratory: Mildly increased work of breathing.  Lung sounds quiet throughout.  Appropriate oxygen saturations on room air.  GI: Soft, non-distended. Non-tender abdomen. No rebound, rigidity, or guarding.   Msk: Extremities without tenderness to palpation or deformity.  No calf swelling or tenderness bilaterally.  Skin: Warm and dry. No rashes.  Neuro: Awake, alert, and conversant. No focal neurologic deficits.   Psych: Appropriate mood and affect.    Emergency Department Course     Imaging:  Chest XR,  PA & LAT   Final Result   IMPRESSION: Linear and patchy opacities in the left upper lobe and   right lung base, suspicious for pneumonia. Consider follow-up after   treatment to ensure resolution. No pleural effusion or pneumothorax.   Normal heart size.      KEELEY SHEPPARD MD            SYSTEM ID:  [id number]         Laboratory:  Labs Ordered and Resulted from Time of ED Arrival to Time of ED Departure   GROUP A STREPTOCOCCUS PCR THROAT SWAB - Normal       Result Value    Group A strep by PCR Not Detected       Emergency Department Course & " Assessments:  Interventions:  Medications   ipratropium - albuterol 0.5 mg/2.5 mg/3 mL (DUONEB) neb solution 6 mL (6 mLs Nebulization $Given 4/24/24 8533)      Assessments:  0934 I obtained history and examined the patient as noted above.  1100 I rechecked the patient and explained findings.    Independent Interpretation (X-rays, CTs, rhythm strip):  Chest x-ray showed patchy opacities at the left upper lobe and right lung base.    Social Determinants of Health affecting care:   None    Disposition:  The patient was discharged.     Impression & Plan    CMS Diagnoses: None    Medical Decision Making:  This is a 38-year-old female with past history of asthma presenting today for evaluation of cough and increased work of breathing as above.  On arrival, vital signs are stable, with mildly elevated pulse and temperature, but she is not hypoxic.  On exam, she has quiet lung sounds throughout.  Chest x-ray here concerning for pneumonia of the left upper lobe and right lung base.  She was given a DuoNeb with moderate improvement in her symptoms.  I suspect the patient has pneumonia with an acute bronchospasm.  She will be treated with Augmentin and azithromycin and should continue her prednisone and albuterol inhaler at home.  There is a a documented 89% on her vital signs, however on recheck, patient is not appropriately wearing her pulse oximeter and O2 sats are more consistent at 94 to 95%.  Do not feel blood work is indicated today and patient agrees.  Regarding the patient's pharyngitis, there is no evidence of peritonsillar abscess or retropharyngeal abscess.  Antibiotic prescriptions sent to pharmacy and she will return for persistent shortness of breath or with new chest pain or fevers persisting in 2-3 days.  I discussed the plan with the patient. All questions were answered and they were in agreement the plan. We discussed signs and symptoms that should prompt immediate reevaluation, and they vocalized  understanding. Patient is safe for discharge to home.     Diagnosis:    ICD-10-CM    1. Pneumonia of left upper lobe due to infectious organism  J18.9       2. Right lower lobe pneumonia  J18.9       3. Bronchospasm  J98.01          Discharge Medications:  New Prescriptions    AMOXICILLIN-CLAVULANATE (AUGMENTIN) 875-125 MG TABLET    Take 1 tablet by mouth 2 times daily for 5 days    AZITHROMYCIN (ZITHROMAX) 250 MG TABLET    Take 2 tablets (500 mg) by mouth daily for 1 day, THEN 1 tablet (250 mg) daily for 4 days.      Scribe Disclosure:  Lucy ROWELL am serving as a scribe at 9:35 AM on 4/24/2024 to document services personally performed by Divine Valencia PA-C based on my observations and the provider's statements to me.   4/24/2024   Divine Valencia PA-C Romano, Amanda, PA-C  04/24/24 1128

## 2024-04-24 NOTE — DISCHARGE INSTRUCTIONS
Augmentin twice daily for 5 days  Z-rosita for 5 days  Follow up with primary care to ensure resolution of pneumonia  Continue with prednisone and albuterol inhaler  Return for worsening shortness of breath or blood in sputum    Discharge Instructions  Bronchitis, Pneumonia, Bronchospasm    You were seen today for a chest infection or inflammation. If your provider decided this was due to a bacterial infection, you may need an antibiotic. Sometimes these are caused by a virus, and then an antibiotic will not help.     Generally, every Emergency Department visit should have a follow-up clinic visit with either a primary or a specialty clinic/provider. Please follow-up as instructed by your emergency provider today.    Return to the Emergency Department if:  Your breathing gets much worse.  You are very weak, or feel much more ill.  You develop new symptoms, such as chest pain.  You cough up blood.  You are vomiting (throwing up) enough that you cannot keep fluids or your medicine down.    What can I do to help myself?  Fill any prescriptions the provider gave you and take them right away--especially antibiotics. Be sure to finish the whole antibiotic prescription.  You may be given a prescription for an inhaler, which can help loosen tight air passages.  Use this as needed, but not more often than directed. Inhalers work much better when used with a spacer.   You may be given a prescription for a steroid to reduce inflammation. Used long-term, these can have side effects, but for short-term use they are safe. You may notice restlessness or increased appetite.      You may use non-prescription cough or cold medicines. Cough medicines may help, but don t make the cough go away completely.   Avoid smoke, because this can make your symptoms worse. If you smoke, this may be a good time to quit! Consider using nicotine lozenges, gum, or patches to reduce cravings.   If you have a fever, Tylenol  (acetaminophen), Motrin   (ibuprofen), or Advil  (ibuprofen) may help bring fever down and may help you feel more comfortable. Be sure to read and follow the package directions, and ask your provider if you have questions.  Be sure to get your flu shot each year.  For certain ages, the pneumonia shot can help prevent pneumonia.  If you were given a prescription for medicine here today, be sure to read all of the information (including the package insert) that comes with your prescription.  This will include important information about the medicine, its side effects, and any warnings that you need to know about.  The pharmacist who fills the prescription can provide more information and answer questions you may have about the medicine.  If you have questions or concerns that the pharmacist cannot address, please call or return to the Emergency Department.     Remember that you can always come back to the Emergency Department if you are not able to see your regular provider in the amount of time listed above, if you get any new symptoms, or if there is anything that worries you.

## 2024-04-30 ENCOUNTER — OFFICE VISIT (OUTPATIENT)
Dept: PEDIATRICS | Facility: CLINIC | Age: 39
End: 2024-04-30
Payer: COMMERCIAL

## 2024-04-30 VITALS
OXYGEN SATURATION: 94 % | DIASTOLIC BLOOD PRESSURE: 62 MMHG | HEART RATE: 70 BPM | BODY MASS INDEX: 48.82 KG/M2 | WEIGHT: 293 LBS | TEMPERATURE: 97.1 F | RESPIRATION RATE: 16 BRPM | SYSTOLIC BLOOD PRESSURE: 104 MMHG | HEIGHT: 65 IN

## 2024-04-30 DIAGNOSIS — R05.1 ACUTE COUGH: ICD-10-CM

## 2024-04-30 DIAGNOSIS — J18.9 PNEUMONIA OF BOTH LUNGS DUE TO INFECTIOUS ORGANISM, UNSPECIFIED PART OF LUNG: Primary | ICD-10-CM

## 2024-04-30 PROCEDURE — G2211 COMPLEX E/M VISIT ADD ON: HCPCS | Performed by: PHYSICIAN ASSISTANT

## 2024-04-30 PROCEDURE — 99213 OFFICE O/P EST LOW 20 MIN: CPT | Performed by: PHYSICIAN ASSISTANT

## 2024-04-30 ASSESSMENT — PATIENT HEALTH QUESTIONNAIRE - PHQ9
SUM OF ALL RESPONSES TO PHQ QUESTIONS 1-9: 10
SUM OF ALL RESPONSES TO PHQ QUESTIONS 1-9: 10
10. IF YOU CHECKED OFF ANY PROBLEMS, HOW DIFFICULT HAVE THESE PROBLEMS MADE IT FOR YOU TO DO YOUR WORK, TAKE CARE OF THINGS AT HOME, OR GET ALONG WITH OTHER PEOPLE: SOMEWHAT DIFFICULT

## 2024-04-30 NOTE — PROGRESS NOTES
"  Assessment & Plan     Pneumonia of both lungs due to infectious organism, unspecified part of lung      Acute cough      Patient is here for a followup on the pneumonia she was diagnosed with on 04/24/2024.  She has completed her course of antibiotics and tolerated them fine.  She is making improvements, but is still tired and coughing.  Discussed home cares for the cough.  She is a bit too early for a followup XR, especially with improving symptoms.  She was advised to followup in about 2-3 weeks for another recheck and a repeat chest XR then.  She was advised to be seen sooner if symptoms change or worsen in any way.  Patient understands and agrees with the plan today.       MED REC REQUIRED  Post Medication Reconciliation Status:   BMI  Estimated body mass index is 49.87 kg/m  as calculated from the following:    Height as of this encounter: 1.651 m (5' 5\").    Weight as of this encounter: 135.9 kg (299 lb 11.2 oz).       Phyllis Duvall is a 39 year old, presenting for the following health issues:  Follow Up      4/30/2024     9:22 AM   Additional Questions   Roomed by Divine Interiano CMA     HPI     Seen in ED for SOB with diagnosis of pneumonia. Completed antibiotics. Starting to feel slightly better. Still has cough, SOB, wheezing at night    Patient is here for a followup regarding her pneumonia.  She states that she has completed both antibiotics.  She was prescribed the Augmentin and Azithromycin.  She is no longer having fevers and has been fever free since Saturday.  She is still coughing and using her albuterol, though she is now using it about every 6 hours and before was taking every 4 hours.  She is not having any body aches or chest pains.        Review of Systems  Constitutional, neuro, ENT, endocrine, pulmonary, cardiac, gastrointestinal, genitourinary, musculoskeletal, integument and psychiatric systems are negative, except as otherwise noted.      Objective    /62 (BP Location: Right " "arm, Patient Position: Sitting, Cuff Size: Adult Large)   Pulse 70   Temp 97.1  F (36.2  C) (Temporal)   Resp 16   Ht 1.651 m (5' 5\")   Wt 135.9 kg (299 lb 11.2 oz)   LMP 04/23/2024 (Within Days)   SpO2 94%   BMI 49.87 kg/m    Body mass index is 49.87 kg/m .  Physical Exam   GENERAL: alert and no distress  EYES: Eyes grossly normal to inspection, PERRL and conjunctivae and sclerae normal  HENT: ear canals and TM's normal, nose and mouth without ulcers or lesions  NECK: no adenopathy, no asymmetry, masses, or scars  RESP: lungs clear to auscultation - no rales, rhonchi or wheezes  CV: regular rate and rhythm, normal S1 S2, no S3 or S4, no murmur, click or rub, no peripheral edema  MS: no gross musculoskeletal defects noted, no edema          Signed Electronically by: Micaela Corbin PA-C    Answers submitted by the patient for this visit:  Patient Health Questionnaire (Submitted on 4/30/2024)  If you checked off any problems, how difficult have these problems made it for you to do your work, take care of things at home, or get along with other people?: Somewhat difficult  PHQ9 TOTAL SCORE: 10    "

## 2024-05-23 ENCOUNTER — OFFICE VISIT (OUTPATIENT)
Dept: PEDIATRICS | Facility: CLINIC | Age: 39
End: 2024-05-23
Payer: COMMERCIAL

## 2024-05-23 VITALS
OXYGEN SATURATION: 97 % | SYSTOLIC BLOOD PRESSURE: 121 MMHG | RESPIRATION RATE: 16 BRPM | HEIGHT: 64 IN | HEART RATE: 71 BPM | DIASTOLIC BLOOD PRESSURE: 80 MMHG | TEMPERATURE: 98.1 F | WEIGHT: 293 LBS | BODY MASS INDEX: 50.02 KG/M2

## 2024-05-23 DIAGNOSIS — R93.89 ABNORMAL X-RAY: ICD-10-CM

## 2024-05-23 DIAGNOSIS — Z87.01 HISTORY OF RECENT PNEUMONIA: Primary | ICD-10-CM

## 2024-05-23 PROCEDURE — 99213 OFFICE O/P EST LOW 20 MIN: CPT | Performed by: PHYSICIAN ASSISTANT

## 2024-05-23 ASSESSMENT — PATIENT HEALTH QUESTIONNAIRE - PHQ9
10. IF YOU CHECKED OFF ANY PROBLEMS, HOW DIFFICULT HAVE THESE PROBLEMS MADE IT FOR YOU TO DO YOUR WORK, TAKE CARE OF THINGS AT HOME, OR GET ALONG WITH OTHER PEOPLE: SOMEWHAT DIFFICULT
SUM OF ALL RESPONSES TO PHQ QUESTIONS 1-9: 12
SUM OF ALL RESPONSES TO PHQ QUESTIONS 1-9: 12

## 2024-05-23 NOTE — PROGRESS NOTES
"  Assessment & Plan     History of recent pneumonia    - XR Chest 2 Views; Future    Abnormal x-ray    - XR Chest 2 Views; Future    Patient was seen today for a followup of her pneumonia.  She is doing much better and feeling improved.  She does still have a mildly productive cough when she wakes in the morning, but she feels good and does not have any chest pain or shortness of breath.  Patient needs a followup XR done, but XR is down today.  Patient will return for a lab only appointment tomorrow 05/24/2024 to have her XR done.  Patient understands and agrees with the plan today.        BMI  Estimated body mass index is 51.84 kg/m  as calculated from the following:    Height as of this encounter: 1.626 m (5' 4\").    Weight as of this encounter: 137 kg (302 lb).       Phyllis Duvall is a 39 year old, presenting for the following health issues:  RECHECK (pneumonia)        5/23/2024     8:36 AM   Additional Questions   Roomed by Haley Mann CMA   Accompanied by N/A         5/23/2024     8:36 AM   Patient Reported Additional Medications   Patient reports taking the following new medications N/A     History of Present Illness       Reason for visit:  Follow up on my pneumonia to make sure it is all gone.    She eats 2-3 servings of fruits and vegetables daily.She consumes 1 sweetened beverage(s) daily.She exercises with enough effort to increase her heart rate 9 or less minutes per day.  She exercises with enough effort to increase her heart rate 3 or less days per week.   She is taking medications regularly.       Patient is here for a followup of recent pneumonia.  She reports that she is doing better and feels improved.  She does not have any concerns of chest pain, shortness of breath and she denies fevers as well.  Of note, she does still have a mild cough in the mornings that is productive of white sputum.  Patient needs a followup XR to confirm resolution as she was found to have bilateral pneumonia.  " "      Review of Systems  Constitutional, HEENT, cardiovascular, pulmonary, gi and gu systems are negative, except as otherwise noted.      Objective    /80 (BP Location: Right arm, Patient Position: Sitting, Cuff Size: Adult Regular)   Pulse 71   Temp 98.1  F (36.7  C) (Oral)   Resp 16   Ht 1.626 m (5' 4\")   Wt 137 kg (302 lb)   LMP 05/23/2024 (Exact Date)   SpO2 97%   BMI 51.84 kg/m    Body mass index is 51.84 kg/m .  Physical Exam   GENERAL: alert and no distress  EYES: Eyes grossly normal to inspection, PERRL and conjunctivae and sclerae normal  NECK: no adenopathy, no asymmetry, masses, or scars  RESP: lungs clear to auscultation - no rales, rhonchi or wheezes  CV: regular rate and rhythm, normal S1 S2, no S3 or S4, no murmur, click or rub, no peripheral edema  MS: no gross musculoskeletal defects noted, no edema    CXR - Reviewed and interpreted by me Pending, as XR is down today.  Patient will have this done within the next 24 hours.          Signed Electronically by: Micaela Corbin PA-C    "

## 2024-05-28 ENCOUNTER — ANCILLARY PROCEDURE (OUTPATIENT)
Dept: GENERAL RADIOLOGY | Facility: CLINIC | Age: 39
End: 2024-05-28
Attending: PHYSICIAN ASSISTANT
Payer: COMMERCIAL

## 2024-05-28 ENCOUNTER — OFFICE VISIT (OUTPATIENT)
Dept: DERMATOLOGY | Facility: CLINIC | Age: 39
End: 2024-05-28
Payer: COMMERCIAL

## 2024-05-28 DIAGNOSIS — D22.9 NEVUS: Primary | ICD-10-CM

## 2024-05-28 DIAGNOSIS — Z87.01 HISTORY OF RECENT PNEUMONIA: ICD-10-CM

## 2024-05-28 DIAGNOSIS — R93.89 ABNORMAL X-RAY: ICD-10-CM

## 2024-05-28 DIAGNOSIS — L81.4 LENTIGO: ICD-10-CM

## 2024-05-28 DIAGNOSIS — L73.2 HIDRADENITIS SUPPURATIVA: ICD-10-CM

## 2024-05-28 DIAGNOSIS — L21.9 DERMATITIS, SEBORRHEIC: ICD-10-CM

## 2024-05-28 DIAGNOSIS — D18.01 ANGIOMA OF SKIN: ICD-10-CM

## 2024-05-28 PROCEDURE — 99213 OFFICE O/P EST LOW 20 MIN: CPT | Performed by: PHYSICIAN ASSISTANT

## 2024-05-28 PROCEDURE — 71046 X-RAY EXAM CHEST 2 VIEWS: CPT | Mod: TC | Performed by: RADIOLOGY

## 2024-05-28 RX ORDER — CLINDAMYCIN PHOSPHATE 10 MG/G
GEL TOPICAL
Qty: 60 G | Refills: 11 | Status: SHIPPED | OUTPATIENT
Start: 2024-05-28

## 2024-05-28 RX ORDER — FLUOCINONIDE TOPICAL SOLUTION USP, 0.05% 0.5 MG/ML
SOLUTION TOPICAL
Qty: 50 ML | Refills: 3 | Status: SHIPPED | OUTPATIENT
Start: 2024-05-28

## 2024-05-28 NOTE — PROGRESS NOTES
HPI:   Chief complaints: Eloina Aj is a pleasant 39 year old female who presents for Full skin cancer screening to rule out skin cancer   Last Skin Exam: 1 year ago     1st Baseline: no  Personal HX of Skin Cancer: no   Personal HX of Malignant Melanoma: no   Family HX of Skin Cancer / Malignant Melanoma: no  Personal HX of Atypical Moles:   no  Risk factors: history of sun exposure and burns; tanning bed use in the [place]  New / Changing lesions:none  Social History: has 2 children 6th grade and .   On review of systems, there are no further skin complaints, patient is feeling otherwise well.   ROS of the following were done and are negative: Constitutional, Eyes, Ears, Nose,   Mouth, Throat, Cardiovascular, Respiratory, GI, Genitourinary, Musculoskeletal,   Psychiatric, Endocrine, Allergic/Immunologic.    PHYSICAL EXAM:   LMP 05/23/2024 (Exact Date)   Skin exam performed as follows: Type 2 skin. Mood appropriate  Alert and Oriented X 3. Well developed, well nourished in no distress.  General appearance: Normal  Head including face: Normal  Eyes: conjunctiva and lids: Normal  Mouth: Lips, teeth, gums: Normal  Neck: Normal  Chest-breast/axillae: Normal  Back: Normal  Extremities: digits/nails (clubbing): Normal  Eccrine and Apocrine glands: Normal  Right upper extremity: Normal  Left upper extremity: Normal  Right lower extremity: Normal  Left lower extremity: Normal  Skin: Scalp and body hair: See below    Pt deferred exam of breasts, groin, buttocks: No    Other physical findings:  1. Multiple pigmented macules on extremities and trunk  2. Multiple pigmented macules on face, trunk and extremities  3. Multiple vascular papules on trunk, arms and legs  4. Multiple scattered keratotic plaques  5. Flaking on the scalp  6. Inflammatory papules beneath breasts       Except as noted above, no other signs of skin cancer or melanoma.     ASSESSMENT/PLAN:   Benign Full skin cancer screening today. .  Patient with history of none  Advised on monthly self exams and 1 year  Patient Education: Appropriate brochures given.    Multiple benign appearing melanocytic nevi on arms, legs and trunk. Discussed ABCDEs of melanoma and sunscreen. Makes > 100 nevi - advised on need for a yearly FSE  Multiple lentigos on arms, legs and trunk. Advised benign, no treatment needed.  Multiple scattered angiomas. Advised benign, no treatment needed.   Seborrheic dermatitis vs psoriasis - doing well on lidex PRN. Refill provided  Hidradenitis suppurativa - doing well on topicals only. advised on diagnosis and treatment options. Discussed inflammatory condition of unknown etiology.  --Continue OTC BPO wash or Hibiclens QD in the shower  --Continue clindamycin gel every day  --Can call for abx if flaring            Follow-up: yearly/PRN sooner    1.) Patient was asked about new and changing moles. YES  2.) Patient received a complete physical skin examination: YES  3.) Patient was counseled to perform a monthly self skin examination: YES  Scribed By: Candi Perkins, MS, PA-C

## 2024-05-28 NOTE — LETTER
5/28/2024         RE: Eloina Aj  [id number] [address] [id number]        Dear Colleague,    Thank you for referring your patient, Eloina Aj, to the [facility]. Please see a copy of my visit note below.    HPI:   Chief complaints: Eloina Aj is a pleasant 39 year old female who presents for Full skin cancer screening to rule out skin cancer   Last Skin Exam: 1 year ago     1st Baseline: no  Personal HX of Skin Cancer: no   Personal HX of Malignant Melanoma: no   Family HX of Skin Cancer / Malignant Melanoma: no  Personal HX of Atypical Moles:   no  Risk factors: history of sun exposure and burns; tanning bed use in the [place]  New / Changing lesions:none  Social History: has 2 children 6th grade and .   On review of systems, there are no further skin complaints, patient is feeling otherwise well.   ROS of the following were done and are negative: Constitutional, Eyes, Ears, Nose,   Mouth, Throat, Cardiovascular, Respiratory, GI, Genitourinary, Musculoskeletal,   Psychiatric, Endocrine, Allergic/Immunologic.    PHYSICAL EXAM:   LMP 05/23/2024 (Exact Date)   Skin exam performed as follows: Type 2 skin. Mood appropriate  Alert and Oriented X 3. Well developed, well nourished in no distress.  General appearance: Normal  Head including face: Normal  Eyes: conjunctiva and lids: Normal  Mouth: Lips, teeth, gums: Normal  Neck: Normal  Chest-breast/axillae: Normal  Back: Normal  Extremities: digits/nails (clubbing): Normal  Eccrine and Apocrine glands: Normal  Right upper extremity: Normal  Left upper extremity: Normal  Right lower extremity: Normal  Left lower extremity: Normal  Skin: Scalp and body hair: See below    Pt deferred exam of breasts, groin, buttocks: No    Other physical findings:  1. Multiple pigmented macules on extremities and trunk  2. Multiple pigmented macules on face, trunk and extremities  3. Multiple vascular papules on trunk,  arms and legs  4. Multiple scattered keratotic plaques  5. Flaking on the scalp  6. Inflammatory papules beneath breasts       Except as noted above, no other signs of skin cancer or melanoma.     ASSESSMENT/PLAN:   Benign Full skin cancer screening today. . Patient with history of none  Advised on monthly self exams and 1 year  Patient Education: Appropriate brochures given.    Multiple benign appearing melanocytic nevi on arms, legs and trunk. Discussed ABCDEs of melanoma and sunscreen. Makes > 100 nevi - advised on need for a yearly FSE  Multiple lentigos on arms, legs and trunk. Advised benign, no treatment needed.  Multiple scattered angiomas. Advised benign, no treatment needed.   Seborrheic dermatitis vs psoriasis - doing well on lidex PRN. Refill provided  Hidradenitis suppurativa - doing well on topicals only. advised on diagnosis and treatment options. Discussed inflammatory condition of unknown etiology.  --Continue OTC BPO wash or Hibiclens QD in the shower  --Continue clindamycin gel every day  --Can call for abx if flaring            Follow-up: yearly/PRN sooner    1.) Patient was asked about new and changing moles. YES  2.) Patient received a complete physical skin examination: YES  3.) Patient was counseled to perform a monthly self skin examination: YES  Scribed By: Candi Perkins, MS, PAGisselC        Again, thank you for allowing me to participate in the care of your patient.        Sincerely,        Candi Perkins PA-C

## 2024-05-31 ENCOUNTER — TRANSFERRED RECORDS (OUTPATIENT)
Dept: HEALTH INFORMATION MANAGEMENT | Facility: CLINIC | Age: 39
End: 2024-05-31
Payer: COMMERCIAL

## 2024-06-18 ENCOUNTER — TRANSFERRED RECORDS (OUTPATIENT)
Dept: HEALTH INFORMATION MANAGEMENT | Facility: CLINIC | Age: 39
End: 2024-06-18
Payer: COMMERCIAL

## 2024-09-25 ENCOUNTER — MYC MEDICAL ADVICE (OUTPATIENT)
Dept: PEDIATRICS | Facility: CLINIC | Age: 39
End: 2024-09-25
Payer: COMMERCIAL

## 2024-09-26 NOTE — TELEPHONE ENCOUNTER
See patient's MyChart message   - Patient confirmed her upcoming appointment on 11/21/2024 at 10:30 AM (arrival time of 10:10 AM) with Dr. Silveira     Appointments in Next Year      Nov 21, 2024 10:30 AM  (Arrive by 10:10 AM)  Provider Visit with Cortney Silveira MD  [facility] ([facility] ) [phone]     Elisa RIOS RN   [facility]

## 2024-09-26 NOTE — TELEPHONE ENCOUNTER
Pt had px in Feb 2024 with OB - checking with ger if she would like OV with PCP or ISA  Ashtyn Chun, VF

## 2024-11-21 ENCOUNTER — OFFICE VISIT (OUTPATIENT)
Dept: PEDIATRICS | Facility: CLINIC | Age: 39
End: 2024-11-21
Payer: COMMERCIAL

## 2024-11-21 VITALS
HEART RATE: 66 BPM | DIASTOLIC BLOOD PRESSURE: 62 MMHG | SYSTOLIC BLOOD PRESSURE: 118 MMHG | BODY MASS INDEX: 50.02 KG/M2 | HEIGHT: 64 IN | RESPIRATION RATE: 14 BRPM | TEMPERATURE: 97.8 F | OXYGEN SATURATION: 98 % | WEIGHT: 293 LBS

## 2024-11-21 DIAGNOSIS — E03.9 HYPOTHYROIDISM, UNSPECIFIED TYPE: Primary | ICD-10-CM

## 2024-11-21 DIAGNOSIS — Z13.0 SCREENING, ANEMIA, DEFICIENCY, IRON: ICD-10-CM

## 2024-11-21 DIAGNOSIS — Z13.220 SCREENING CHOLESTEROL LEVEL: ICD-10-CM

## 2024-11-21 DIAGNOSIS — F33.1 MAJOR DEPRESSIVE DISORDER, RECURRENT EPISODE, MODERATE (H): ICD-10-CM

## 2024-11-21 DIAGNOSIS — R73.03 PREDIABETES: ICD-10-CM

## 2024-11-21 DIAGNOSIS — E28.2 PCOS (POLYCYSTIC OVARIAN SYNDROME): ICD-10-CM

## 2024-11-21 LAB
ANION GAP SERPL CALCULATED.3IONS-SCNC: 11 MMOL/L (ref 7–15)
BASOPHILS # BLD AUTO: 0 10E3/UL (ref 0–0.2)
BASOPHILS NFR BLD AUTO: 1 %
BUN SERPL-MCNC: 12.6 MG/DL (ref 6–20)
CALCIUM SERPL-MCNC: 8.8 MG/DL (ref 8.8–10.4)
CHLORIDE SERPL-SCNC: 104 MMOL/L (ref 98–107)
CHOLEST SERPL-MCNC: 195 MG/DL
CREAT SERPL-MCNC: 1.01 MG/DL (ref 0.51–0.95)
EGFRCR SERPLBLD CKD-EPI 2021: 71 ML/MIN/1.73M2
EOSINOPHIL # BLD AUTO: 0.1 10E3/UL (ref 0–0.7)
EOSINOPHIL NFR BLD AUTO: 1 %
ERYTHROCYTE [DISTWIDTH] IN BLOOD BY AUTOMATED COUNT: 13.5 % (ref 10–15)
EST. AVERAGE GLUCOSE BLD GHB EST-MCNC: 128 MG/DL
FASTING STATUS PATIENT QL REPORTED: YES
FASTING STATUS PATIENT QL REPORTED: YES
GLUCOSE SERPL-MCNC: 103 MG/DL (ref 70–99)
HBA1C MFR BLD: 6.1 % (ref 0–5.6)
HCO3 SERPL-SCNC: 25 MMOL/L (ref 22–29)
HCT VFR BLD AUTO: 37.6 % (ref 35–47)
HDLC SERPL-MCNC: 61 MG/DL
HGB BLD-MCNC: 12 G/DL (ref 11.7–15.7)
IMM GRANULOCYTES # BLD: 0 10E3/UL
IMM GRANULOCYTES NFR BLD: 0 %
LDLC SERPL CALC-MCNC: 110 MG/DL
LYMPHOCYTES # BLD AUTO: 1.4 10E3/UL (ref 0.8–5.3)
LYMPHOCYTES NFR BLD AUTO: 21 %
MCH RBC QN AUTO: 28.2 PG (ref 26.5–33)
MCHC RBC AUTO-ENTMCNC: 31.9 G/DL (ref 31.5–36.5)
MCV RBC AUTO: 89 FL (ref 78–100)
MONOCYTES # BLD AUTO: 0.3 10E3/UL (ref 0–1.3)
MONOCYTES NFR BLD AUTO: 5 %
NEUTROPHILS # BLD AUTO: 4.9 10E3/UL (ref 1.6–8.3)
NEUTROPHILS NFR BLD AUTO: 73 %
NONHDLC SERPL-MCNC: 134 MG/DL
PLATELET # BLD AUTO: 193 10E3/UL (ref 150–450)
POTASSIUM SERPL-SCNC: 4.2 MMOL/L (ref 3.4–5.3)
RBC # BLD AUTO: 4.25 10E6/UL (ref 3.8–5.2)
SODIUM SERPL-SCNC: 140 MMOL/L (ref 135–145)
TRIGL SERPL-MCNC: 120 MG/DL
TSH SERPL DL<=0.005 MIU/L-ACNC: 3.35 UIU/ML (ref 0.3–4.2)
WBC # BLD AUTO: 6.7 10E3/UL (ref 4–11)

## 2024-11-21 ASSESSMENT — PATIENT HEALTH QUESTIONNAIRE - PHQ9
10. IF YOU CHECKED OFF ANY PROBLEMS, HOW DIFFICULT HAVE THESE PROBLEMS MADE IT FOR YOU TO DO YOUR WORK, TAKE CARE OF THINGS AT HOME, OR GET ALONG WITH OTHER PEOPLE: SOMEWHAT DIFFICULT
SUM OF ALL RESPONSES TO PHQ QUESTIONS 1-9: 14
SUM OF ALL RESPONSES TO PHQ QUESTIONS 1-9: 14

## 2024-11-21 NOTE — PROGRESS NOTES
"  Assessment & Plan     Hypothyroidism, unspecified type    - TSH WITH FREE T4 REFLEX; Future  - TSH WITH FREE T4 REFLEX    Prediabetes    - HEMOGLOBIN A1C; Future  - Basic metabolic panel  (Ca, Cl, CO2, Creat, Gluc, K, Na, BUN); Future  - HEMOGLOBIN A1C  - Basic metabolic panel  (Ca, Cl, CO2, Creat, Gluc, K, Na, BUN)    - Taking Metformin, two times daily.       PCOS (polycystic ovarian syndrome)    - Basic metabolic panel  (Ca, Cl, CO2, Creat, Gluc, K, Na, BUN); Future  - Basic metabolic panel  (Ca, Cl, CO2, Creat, Gluc, K, Na, BUN)    Screening cholesterol level    - Lipid panel reflex to direct LDL Fasting; Future  - Lipid panel reflex to direct LDL Fasting    Screening, anemia, deficiency, iron    - CBC with platelets and differential; Future  - CBC with platelets and differential    Major depressive disorder, recurrent episode, moderate (H)    - Taking Wellbutrin and Lexapro.   - Lexapro dose is 20 mg once daily.    - Patient feels well controlled.        Patient is doing well on her current medication regimen.  She just had refills sent to pharmacy and will be good through January, 2025.  She can have refills for another 90 days after that.  Labs are pending today as well.  Patient to followup for annual visits, sooner if needed.  Patient understands and agrees with the plan today.        BMI  Estimated body mass index is 52.63 kg/m  as calculated from the following:    Height as of this encounter: 1.626 m (5' 4\").    Weight as of this encounter: 139.1 kg (306 lb 9.6 oz).         Phyllis Duvall is a 39 year old, presenting for the following health issues:  Recheck Medication      11/21/2024     8:50 AM   Additional Questions   Roomed by Divine Interiano CMA     History of Present Illness       Reason for visit:  Med check    She eats 2-3 servings of fruits and vegetables daily.She consumes 0 sweetened beverage(s) daily.She exercises with enough effort to increase her heart rate 9 or less minutes per day.  " "She exercises with enough effort to increase her heart rate 3 or less days per week.   She is taking medications regularly.       Patient is here for a medication check.  She reports that she is taking her medications as prescribed and doing well on them.  She has refills through January, but will need them beyond then.  She does not have any concerns or symptoms to discuss today.          Review of Systems  Constitutional, neuro, ENT, endocrine, pulmonary, cardiac, gastrointestinal, genitourinary, musculoskeletal, integument and psychiatric systems are negative, except as otherwise noted.      Objective    /62 (BP Location: Right arm, Patient Position: Sitting, Cuff Size: Adult Large)   Pulse 66   Temp 97.8  F (36.6  C) (Temporal)   Resp 14   Ht 1.626 m (5' 4\")   Wt 139.1 kg (306 lb 9.6 oz)   LMP 11/02/2024 (Exact Date)   SpO2 98%   BMI 52.63 kg/m    Body mass index is 52.63 kg/m .  Physical Exam   GENERAL: alert and no distress  EYES: Eyes grossly normal to inspection, PERRL and conjunctivae and sclerae normal  NECK: no adenopathy, no asymmetry, masses, or scars  RESP: lungs clear to auscultation - no rales, rhonchi or wheezes  CV: regular rate and rhythm, normal S1 S2, no S3 or S4, no murmur, click or rub, no peripheral edema  MS: no gross musculoskeletal defects noted, no edema  NEURO: Normal strength and tone, mentation intact and speech normal          Signed Electronically by: Micaela Corbin PA-C    "

## 2025-01-25 ENCOUNTER — HEALTH MAINTENANCE LETTER (OUTPATIENT)
Age: 40
End: 2025-01-25

## 2025-03-06 DIAGNOSIS — F41.9 ANXIETY: ICD-10-CM

## 2025-03-06 DIAGNOSIS — F33.1 MAJOR DEPRESSIVE DISORDER, RECURRENT EPISODE, MODERATE (H): ICD-10-CM

## 2025-03-07 RX ORDER — ESCITALOPRAM OXALATE 20 MG/1
20 TABLET ORAL DAILY
Qty: 90 TABLET | Refills: 0 | Status: SHIPPED | OUTPATIENT
Start: 2025-03-07

## 2025-03-07 NOTE — TELEPHONE ENCOUNTER
Refilled x 90 days.    Patient due for in person physical with me - schedule in about 3 months (same day ok)

## 2025-03-07 NOTE — TELEPHONE ENCOUNTER
1st attempt: sent pt a [employer] message to schedule.    Violetta Rivera MA 1:27 PM 3/7/2025

## 2025-04-12 ENCOUNTER — HEALTH MAINTENANCE LETTER (OUTPATIENT)
Age: 40
End: 2025-04-12

## 2025-05-06 ENCOUNTER — LAB REQUISITION (OUTPATIENT)
Dept: LAB | Facility: CLINIC | Age: 40
End: 2025-05-06
Payer: COMMERCIAL

## 2025-05-06 DIAGNOSIS — Z12.4 ENCOUNTER FOR SCREENING FOR MALIGNANT NEOPLASM OF CERVIX: ICD-10-CM

## 2025-05-06 PROCEDURE — 87624 HPV HI-RISK TYP POOLED RSLT: CPT | Performed by: OBSTETRICS & GYNECOLOGY

## 2025-05-06 PROCEDURE — G0145 SCR C/V CYTO,THINLAYER,RESCR: HCPCS | Performed by: OBSTETRICS & GYNECOLOGY

## 2025-05-07 LAB
HPV HR 12 DNA CVX QL NAA+PROBE: NEGATIVE
HPV16 DNA CVX QL NAA+PROBE: NEGATIVE
HPV18 DNA CVX QL NAA+PROBE: NEGATIVE
HUMAN PAPILLOMA VIRUS FINAL DIAGNOSIS: NORMAL

## 2025-05-09 LAB
BKR AP ASSOCIATED HPV REPORT: NORMAL
BKR LAB AP GYN ADEQUACY: NORMAL
BKR LAB AP GYN INTERPRETATION: NORMAL
BKR LAB AP LMP: NORMAL
BKR LAB AP PREVIOUS ABNL DX: NORMAL
BKR LAB AP PREVIOUS ABNORMAL: NORMAL
PATH REPORT.COMMENTS IMP SPEC: NORMAL
PATH REPORT.COMMENTS IMP SPEC: NORMAL
PATH REPORT.RELEVANT HX SPEC: NORMAL

## 2025-06-11 DIAGNOSIS — E28.2 PCOS (POLYCYSTIC OVARIAN SYNDROME): ICD-10-CM

## 2025-06-11 DIAGNOSIS — F41.9 ANXIETY: ICD-10-CM

## 2025-06-11 DIAGNOSIS — F33.1 MAJOR DEPRESSIVE DISORDER, RECURRENT EPISODE, MODERATE (H): ICD-10-CM

## 2025-06-11 RX ORDER — ESCITALOPRAM OXALATE 20 MG/1
20 TABLET ORAL DAILY
Qty: 90 TABLET | Refills: 0 | Status: SHIPPED | OUTPATIENT
Start: 2025-06-11

## 2025-06-11 RX ORDER — BUPROPION HYDROCHLORIDE 200 MG/1
200 TABLET, EXTENDED RELEASE ORAL 2 TIMES DAILY
Qty: 60 TABLET | Refills: 3 | Status: SHIPPED | OUTPATIENT
Start: 2025-06-11

## (undated) DEVICE — SU VICRYL 0 TIE 12X18" J906G

## (undated) DEVICE — ESU PENCIL W/HOLSTER E2350H

## (undated) DEVICE — GLOVE PROTEXIS MICRO 7.0  2D73PM70

## (undated) DEVICE — BNDG ABDOMINAL BINDER 9X62-84" 79-89210

## (undated) DEVICE — ESU GROUND PAD ADULT W/CORD E7507

## (undated) DEVICE — PAD CHUX UNDERPAD 30X36" P3036C

## (undated) DEVICE — RETR VISCERA FISH MED 3204

## (undated) DEVICE — GLOVE PROTEXIS POWDER FREE 7.5 ORTHOPEDIC 2D73ET75

## (undated) DEVICE — LINEN TOWEL PACK X10 5473

## (undated) DEVICE — BARRIER SEPRAFILM 5X6" SINGLE SHEET 4301-02

## (undated) DEVICE — PREP CHLORAPREP 26ML TINTED ORANGE  260815

## (undated) DEVICE — LINEN HALF SHEET 5512

## (undated) DEVICE — SOL WATER IRRIG 1000ML BOTTLE 2F7114

## (undated) DEVICE — LINEN DRAPE 54X72" 5467

## (undated) DEVICE — TUBING SUCTION MEDI-VAC SOFT 3/16"X20' N520A

## (undated) DEVICE — ENDO TROCAR SLEEVE KII Z-THREADED 05X100MM CTS02

## (undated) DEVICE — DRSG GAUZE 4X4" 8044

## (undated) DEVICE — BLADE KNIFE SURG 10 371110

## (undated) DEVICE — SU VICRYL 3-0 CT-1 36" J944H

## (undated) DEVICE — EVAC SYSTEM CLEAR FLOW SC082500

## (undated) DEVICE — BAG CLEAR TRASH 1.3M 39X33" P4040C

## (undated) DEVICE — DRSG STERI STRIP 1/2X4" R1547

## (undated) DEVICE — SUCTION IRR STRYKERFLOW II W/TIP 250-070-520

## (undated) DEVICE — CAP BABY PINK/BLUE IC-2

## (undated) DEVICE — Device

## (undated) DEVICE — PACK C-SECTION LF PL15OTA83B

## (undated) DEVICE — SU VICRYL 4-0 PS-2 18" UND J496H

## (undated) DEVICE — PREP SKIN SCRUB TRAY 4461A

## (undated) DEVICE — KIT PROCEDURE W/CLEAN-A-SCOPE LINERS V2 200800

## (undated) DEVICE — ENDO FORCEP ENDOJAW BIOPSY 2.8MMX230CM FB-220U

## (undated) DEVICE — LINEN POUCH DBL 5427

## (undated) DEVICE — TRANSFER DEVICE BLOOD NDL HOLDER 364880

## (undated) DEVICE — GOWN IMPERVIOUS ZONED XLG 9041

## (undated) DEVICE — TUBING INSUFFLATION W/FILTER 10FT GS1016

## (undated) DEVICE — SPONGE LAP 18X18" X8435

## (undated) DEVICE — STOCKING SLEEVE VASOPRESS COMPRESSION CALF MED 18" VP501M

## (undated) DEVICE — SU VICRYL 0 CT 36" J358H

## (undated) DEVICE — ENDO TROCAR FIRST ENTRY KII FIOS Z-THRD 05X100MM CTF03

## (undated) DEVICE — DRAIN JACKSON PRATT 15FR ROUND SIL LF JP-2229

## (undated) DEVICE — SU PLAIN 2-0 CT-1 27" 843H

## (undated) DEVICE — SUCTION CANISTER MEDIVAC LINER 3000ML W/LID 65651-530

## (undated) DEVICE — ESU ELEC BLADE 2.75" COATED/INSULATED E1455

## (undated) DEVICE — DRAIN JACKSON PRATT RESERVOIR 100ML SU130-1305

## (undated) DEVICE — SOL NACL 0.9% IRRIG 1000ML BOTTLE 2F7124

## (undated) DEVICE — SU ETHILON 3-0 PS-2 18" 1669H

## (undated) DEVICE — LINEN BABY BLANKET 5434

## (undated) DEVICE — BLADE CLIPPER SGL USE 9680

## (undated) DEVICE — TUBING SUCTION 12"X1/4" N612

## (undated) DEVICE — BLADE KNIFE SURG 11 371111

## (undated) DEVICE — PREP POVIDONE IODINE SOLUTION 10% 120ML

## (undated) DEVICE — LINEN FULL SHEET 5511

## (undated) DEVICE — SUCTION MANIFOLD NEPTUNE 2 SYS 4 PORT 0702-020-000

## (undated) DEVICE — SU VICRYL 3-0 SH 27" J316H

## (undated) DEVICE — CATH TRAY FOLEY SURESTEP 16FR DRAIN BAG STATOCK A899916

## (undated) DEVICE — GLOVE PROTEXIS BLUE W/NEU-THERA 6.5  2D73EB65

## (undated) DEVICE — ADH SKIN CLOSURE PREMIERPRO EXOFIN MICOR HV 0.5ML 3471

## (undated) DEVICE — SUCTION KIWI VAC  VAC-6000M

## (undated) DEVICE — SOL NACL 0.9% INJ 1000ML BAG 2B1324X

## (undated) DEVICE — SU PDS II 0 CT-2 27" Z334H

## (undated) DEVICE — DRSG ABDOMINAL 07 1/2X8" 7197D

## (undated) DEVICE — SU MONOCRYL 4-0 PS-2 27" UND Y426H

## (undated) DEVICE — GLOVE PROTEXIS W/NEU-THERA 6.5  2D73TE65

## (undated) DEVICE — ENDO GELPORT 100/120MM C8XX2

## (undated) RX ORDER — GLYCOPYRROLATE 0.2 MG/ML
INJECTION INTRAMUSCULAR; INTRAVENOUS
Status: DISPENSED
Start: 2021-05-12

## (undated) RX ORDER — DEXAMETHASONE SODIUM PHOSPHATE 4 MG/ML
INJECTION, SOLUTION INTRA-ARTICULAR; INTRALESIONAL; INTRAMUSCULAR; INTRAVENOUS; SOFT TISSUE
Status: DISPENSED
Start: 2021-05-12

## (undated) RX ORDER — LIDOCAINE HYDROCHLORIDE 10 MG/ML
INJECTION, SOLUTION EPIDURAL; INFILTRATION; INTRACAUDAL; PERINEURAL
Status: DISPENSED
Start: 2021-05-12

## (undated) RX ORDER — MORPHINE SULFATE 1 MG/ML
INJECTION, SOLUTION EPIDURAL; INTRATHECAL; INTRAVENOUS
Status: DISPENSED
Start: 2018-03-29

## (undated) RX ORDER — PROPOFOL 10 MG/ML
INJECTION, EMULSION INTRAVENOUS
Status: DISPENSED
Start: 2023-12-21

## (undated) RX ORDER — CEFAZOLIN SODIUM IN 0.9 % NACL 3 G/100 ML
INTRAVENOUS SOLUTION, PIGGYBACK (ML) INTRAVENOUS
Status: DISPENSED
Start: 2021-05-12

## (undated) RX ORDER — FENTANYL CITRATE 50 UG/ML
INJECTION, SOLUTION INTRAMUSCULAR; INTRAVENOUS
Status: DISPENSED
Start: 2018-03-29

## (undated) RX ORDER — BUPIVACAINE HYDROCHLORIDE 5 MG/ML
INJECTION, SOLUTION EPIDURAL; INTRACAUDAL
Status: DISPENSED
Start: 2021-05-12

## (undated) RX ORDER — LIDOCAINE HYDROCHLORIDE 10 MG/ML
INJECTION, SOLUTION INFILTRATION; PERINEURAL
Status: DISPENSED
Start: 2021-04-19

## (undated) RX ORDER — LIDOCAINE HYDROCHLORIDE 10 MG/ML
INJECTION, SOLUTION INFILTRATION; PERINEURAL
Status: DISPENSED
Start: 2021-04-14

## (undated) RX ORDER — ONDANSETRON 2 MG/ML
INJECTION INTRAMUSCULAR; INTRAVENOUS
Status: DISPENSED
Start: 2021-05-12

## (undated) RX ORDER — FENTANYL CITRATE 50 UG/ML
INJECTION, SOLUTION INTRAMUSCULAR; INTRAVENOUS
Status: DISPENSED
Start: 2021-05-12

## (undated) RX ORDER — HEPARIN SODIUM 200 [USP'U]/100ML
INJECTION, SOLUTION INTRAVENOUS
Status: DISPENSED
Start: 2021-04-16

## (undated) RX ORDER — LIDOCAINE HYDROCHLORIDE 10 MG/ML
INJECTION, SOLUTION INFILTRATION; PERINEURAL
Status: DISPENSED
Start: 2021-04-16

## (undated) RX ORDER — FENTANYL CITRATE 50 UG/ML
INJECTION, SOLUTION INTRAMUSCULAR; INTRAVENOUS
Status: DISPENSED
Start: 2023-12-21

## (undated) RX ORDER — ONDANSETRON 2 MG/ML
INJECTION INTRAMUSCULAR; INTRAVENOUS
Status: DISPENSED
Start: 2023-12-21

## (undated) RX ORDER — ONDANSETRON 2 MG/ML
INJECTION INTRAMUSCULAR; INTRAVENOUS
Status: DISPENSED
Start: 2018-03-29

## (undated) RX ORDER — PROPOFOL 10 MG/ML
INJECTION, EMULSION INTRAVENOUS
Status: DISPENSED
Start: 2021-05-12

## (undated) RX ORDER — OXYTOCIN/0.9 % SODIUM CHLORIDE 30/500 ML
PLASTIC BAG, INJECTION (ML) INTRAVENOUS
Status: DISPENSED
Start: 2018-03-29

## (undated) RX ORDER — DEXAMETHASONE SODIUM PHOSPHATE 4 MG/ML
INJECTION, SOLUTION INTRA-ARTICULAR; INTRALESIONAL; INTRAMUSCULAR; INTRAVENOUS; SOFT TISSUE
Status: DISPENSED
Start: 2023-12-21

## (undated) RX ORDER — PHENYLEPHRINE HCL IN 0.9% NACL 1 MG/10 ML
SYRINGE (ML) INTRAVENOUS
Status: DISPENSED
Start: 2018-03-29